# Patient Record
Sex: MALE | Race: WHITE | Employment: OTHER | URBAN - METROPOLITAN AREA
[De-identification: names, ages, dates, MRNs, and addresses within clinical notes are randomized per-mention and may not be internally consistent; named-entity substitution may affect disease eponyms.]

---

## 2018-03-21 ENCOUNTER — TELEPHONE (OUTPATIENT)
Dept: DERMATOLOGY | Facility: AMBULATORY SURGERY CENTER | Age: 78
End: 2018-03-21

## 2018-03-27 ENCOUNTER — OFFICE VISIT (OUTPATIENT)
Dept: DERMATOLOGY | Facility: AMBULATORY SURGERY CENTER | Age: 78
End: 2018-03-27

## 2018-03-27 VITALS
RESPIRATION RATE: 16 BRPM | HEIGHT: 73 IN | BODY MASS INDEX: 30.48 KG/M2 | WEIGHT: 230 LBS | DIASTOLIC BLOOD PRESSURE: 70 MMHG | OXYGEN SATURATION: 95 % | SYSTOLIC BLOOD PRESSURE: 130 MMHG | HEART RATE: 53 BPM

## 2018-03-27 DIAGNOSIS — C44.212 BASAL CELL CARCINOMA OF RIGHT EAR: Primary | ICD-10-CM

## 2018-03-27 RX ORDER — CARVEDILOL 12.5 MG/1
6.25 TABLET ORAL 2 TIMES DAILY WITH MEALS
COMMUNITY
Start: 2018-02-05 | End: 2022-01-01

## 2018-03-27 RX ORDER — LOSARTAN POTASSIUM 25 MG/1
25 TABLET ORAL DAILY
COMMUNITY
Start: 2018-02-06 | End: 2022-01-01

## 2018-03-27 RX ORDER — LOVASTATIN 20 MG/1
20 TABLET ORAL DAILY
COMMUNITY
Start: 2018-02-05 | End: 2022-01-01

## 2018-03-27 NOTE — PROGRESS NOTES
This note is written by Alexa Leong, as dictated by Guerda Rios. Nicolasa Alvarez MD.    CC: Basal cell carcinoma on the right scaphoid fossa     History of present illness:     Olu Lance is a 65 y.o. male referred by Dr. Volodymyr Jay. 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 This is a new basal cell carcinoma present for many months described as a lesion he picks at with no prior treatment. Biopsy confirmed the diagnosis of basal cell carcinoma, and I reviewed the written pathology report. He is feeling well and in his usual state of health today. He has no pain, no current illnesses, no other skin concerns. His allergies, medications, medical, and social history are reviewed by me today. Exam:     He is an awake, alert, and oriented 65 y.o. male who appears well and in no distress. There is no preauricular, submandibular, or cervical lymphadenopathy. I examined his right ear. He has a 14 x 8 mm white scar with central crust on his right scaphoid fossa. He confirms location. Assessment/plan:    1. Basal cell carcinoma, right scaphoid fossa. I discussed the diagnosis of basal cell carcinoma and summarized the pathology report. Mohs surgery is indicated by site and size. The procedure was discussed, verbal and written consent were obtained. I performed the procedure. Three stages were required to reach a tumor free plane. The surgical defect was managed with a full thickness skin graft. There were no complications. He will follow up in one week and as needed as the site heals. Indications, risks, and options were discussed with Olu Lance preoperatively. Risks including, but not limited to: pain, bleeding, infection, tumor recurrence, scarring and damage to motor and/or sensory nerves, were discussed. Olu Lance chose Mohs surgery. Olu Lance was an acceptable surgery candidate.     Olu Lance was placed in the appropriate position on the operating table in the Mohs surgery procedure room. The area was prepped and draped in the standard manner. Gentian violet was used to outline the clinical margins of the tumor. Local anesthesia was then obtained. The grossly visible tumor was then removed, an underlying layer was excised and mapped according to the Mohs technique, and the individual specimens examined microscopically. The process was repeated until microscopic examination of the tissue specimens confirmed a tumor-free plane. Hemostasis was obtained with electrosurgery and pressure. The wound was covered between stages with moist saline gauze. The wound management options of second intent healing, layered closure, local flap, or full thickness skin graft were discussed. Mr. Abbi Herrera understands the aims, risks, alternatives, and possible complications and elects to proceed with a full thickness skin graft. Mr. Abbi Herrera was placed in a supine position on the operating table in the Mohs surgery procedure room. The area was prepped and draped in the standard manner. A template of donor skin the size of the wound was drawn at the right preauricular. 1% lidocaine with epinephrine 1:100,000 was used to anesthetize the donor area. The graft was removed from the donor area and defatted. Hemostasis was obtained with spot electrocoagulation. The margins of the donor site were undermined and then the site was sutured with 5-0 polysorb sutures to approximate the skin edges. The defatted graft was placed on the recipient site and anchored in place with a running 5-0 fast absorbing gut suture around the margin. A pressure dressing utilizing Vaseline, Telfa, gauze and Coverroll was placed on the graft site. Skin glue, Telfa, gauze, and Coverroll were placed on the donor site. Wound care instructions (written and verbal) and a follow up appointment were given to the patient before discharge. Mr. Abbi Herrera was discharged in good condition. 2. History of nonmelanoma skin cancer. I discussed the diagnosis and recommend routine examinations with Dr. Yamil Aguirre for surveillance. The documentation recorded by the scribe accurately reflects the service I personally performed and the decisions made by me. [facility]   OFFICE PROCEDURE PROGRESS NOTE     Chart reviewed for the following:     Sonia Officer Kennth Fothergill, MD, have reviewed the History, Physical and updated the Allergic reactions for [address] [place] [address] 28 performed immediately prior to start of procedure:     Sonia Officer Kennth Fothergill, MD, have performed the following reviews on Pearlene Agent prior to the start of the procedure:     * Patient was identified by name and date of birth   * Agreement on procedure being performed was verified   * Risks and Benefits explained to the patient   * Procedure site verified and marked as necessary   * Patient was positioned for comfort   * Consent was signed and verified     Time: 10:25 AM   Date of procedure: 3/27/2018  Procedure performed by: Elvis Ramos.  Kennth Fothergill, MD   Provider assisted by: LPN   Patient assisted by: self   How tolerated by patient: tolerated the procedure well with no complications   Comments: none

## 2018-03-27 NOTE — MR AVS SNAPSHOT
[facility] [address] 
[phone] Patient: Wayne aHrdy MRN: [id number] :[id number] Visit Information Date & Time Provider Department Dept. Phone Encounter #  
 3/27/2018 10:30 AM MD Adelina Garcia [id number] Upcoming Health Maintenance Date Due DTaP/Tdap/Td series (1 - Tdap) 1960 ZOSTER VACCINE AGE 60> 10/23/1999 GLAUCOMA SCREENING Q2Y 2004 Pneumococcal 65+ Low/Medium Risk (1 of 2 - PCV13) 2004 Influenza Age 5 to Adult 2017 MEDICARE YEARLY EXAM 3/27/2018 Allergies as of 3/27/2018  Review Complete On: 3/27/2018 By: Clotilde Pereyra LPN No Known Allergies Current Immunizations  Never Reviewed No immunizations on file. Not reviewed this visit You Were Diagnosed With   
  
 Codes Comments Basal cell carcinoma of right ear    -  Primary ICD-10-CM: I88.363 ICD-9-CM: 173.21 Vitals BP Pulse Resp Height(growth percentile) Weight(growth percentile) SpO2  
 130/70 (BP 1 Location: Left arm, BP Patient Position: Sitting) (!) 53 16 6' 1\" (1.854 m) 230 lb (104.3 kg) 95% BMI Smoking Status 30.34 kg/m2 Former Smoker BMI and BSA Data Body Mass Index Body Surface Area  
 30.34 kg/m 2 2.32 m 2 Your Updated Medication List  
  
   
This list is accurate as of 3/27/18 10:52 AM.  Always use your most recent med list.  
  
  
  
  
 carvedilol 12.5 mg tablet Commonly known as:  COREG  
  
 losartan 25 mg tablet Commonly known as:  COZAAR  
  
 lovastatin 20 mg tablet Commonly known as:  MEVACOR Patient Instructions WOUND CARE INSTRUCTIONS 1. Keep the dressing clean and dry and do not remove for 48 hours. 2. Then change the dressing once a day as follows: 
a. Wash hands before and after each dressing change. b. Remove dressing and wash site gently with mild soap and water, rinse, and pat dry. 
c. Apply an ointment (Bacitracin, Polysporin, Neosporin, Petroleum jelly or Aquaphor). d. Apply a non-stick (Telfa) dressing or Band-Aid to cover the wound. Keep bandage on graft clean and dry for 1 week until follow up appointment. Remove pressure bandage from donor site in 48 hours and wash gently. 3. Watch for: BLEEDING: A small amount of drainage may occur. If bleeding occurs, elevate and rest the surgery site. Apply gauze and steady pressure for 15 minutes. If bleeding continues, call this office. INFECTION: Signs of infection include increased redness, pain, warmth, drainage of pus, and fever. If this occurs, call this office. 4. Special Instructions (follow any that are checked): 
· [] You have stitches that DO/ DO NOT need to be removed. · [] Avoid bending at the waist and heavy lifting for two days. · [] Sleep with your head elevated for the next two nights. · [] Rest the surgery site and keep it elevated as much as possible for two days. · [] You may apply an ice-pack for 10-15 minutes every waking hour for the rest of the day. · [] Eat a soft diet and avoid hot food and hot drinks for the rest of the day. · [] Other instructions: Follow up as directed. Take Tylenol or Ibuprofen for pain as needed. Once the site is healed with no remaining bandages or open areas, protect your surgical site and scar from the sun, as this area will be more sensitive. Use a broad spectrum sunscreen SPF 30 or higher daily, and a chemical free product (one containing zinc oxide or titanium dioxide) is a good choice if the area is sensitive. You may begin to gently massage the surgical site in 2-3 weeks, rubbing in a circular motion along the scar. This can help reduce swelling and thickness of a scar. A scar cream may be used beginnning 1 month after the surgery. If you have any questions or concerns, please call our office Monday through Friday at [phone]. Introducing [facility]! [facility] introduces [employer] patient portal. Now you can access parts of your medical record, email your doctor's office, and request medication refills online. 1. In your internet browser, go to [email or web address] 2. Click on the First Time User? Click Here link in the Sign In box. You will see the New Member Sign Up page. 3. Enter your [employer] Access Code exactly as it appears below. You will not need to use this code after youve completed the sign-up process. If you do not sign up before the expiration date, you must request a new code. · [employer] Access Code: [email or web address] Expires: 5/28/2018 10:07 AM 
 
4. Enter the last four digits of your Social Security Number (xxxx) and Date of Birth (mm/dd/yyyy) as indicated and click Submit. You will be taken to the next sign-up page. 5. Create a [employer] ID. This will be your [employer] login ID and cannot be changed, so think of one that is secure and easy to remember. 6. Create a [employer] password. You can change your password at any time. 7. Enter your Password Reset Question and Answer. This can be used at a later time if you forget your password. 8. Enter your e-mail address. You will receive e-mail notification when new information is available in [address] 9. Click Sign Up. You can now view and download portions of your medical record. 10. Click the Download Summary menu link to download a portable copy of your medical information. If you have questions, please visit the Frequently Asked Questions section of the [employer] website. Remember, [employer] is NOT to be used for urgent needs. For medical emergencies, dial 911. Now available from your iPhone and Android! Please provide this summary of care documentation to your next provider. Your primary care clinician is listed as Mac Eason. If you have any questions after today's visit, please call [phone].

## 2018-03-27 NOTE — PATIENT INSTRUCTIONS
WOUND CARE INSTRUCTIONS    1. Keep the dressing clean and dry and do not remove for 48 hours. 2. Then change the dressing once a day as follows:  a. Wash hands before and after each dressing change. b. Remove dressing and wash site gently with mild soap and water, rinse, and pat dry.  c. Apply an ointment (Bacitracin, Polysporin, Neosporin, Petroleum jelly or Aquaphor). d. Apply a non-stick (Telfa) dressing or Band-Aid to cover the wound. Keep bandage on graft clean and dry for 1 week until follow up appointment. Remove pressure bandage from donor site in 48 hours and wash gently. 3. Watch for:  BLEEDING: A small amount of drainage may occur. If bleeding occurs, elevate and rest the surgery site. Apply gauze and steady pressure for 15 minutes. If bleeding continues, call this office. INFECTION: Signs of infection include increased redness, pain, warmth, drainage of pus, and fever. If this occurs, call this office. 4. Special Instructions (follow any that are checked):  · [] You have stitches that DO/ DO NOT need to be removed. · [] Avoid bending at the waist and heavy lifting for two days. · [] Sleep with your head elevated for the next two nights. · [] Rest the surgery site and keep it elevated as much as possible for two days. · [] You may apply an ice-pack for 10-15 minutes every waking hour for the rest of the day. · [] Eat a soft diet and avoid hot food and hot drinks for the rest of the day. · [] Other instructions: Follow up as directed. Take Tylenol or Ibuprofen for pain as needed. Once the site is healed with no remaining bandages or open areas, protect your surgical site and scar from the sun, as this area will be more sensitive. Use a broad spectrum sunscreen SPF 30 or higher daily, and a chemical free product (one containing zinc oxide or titanium dioxide) is a good choice if the area is sensitive.     You may begin to gently massage the surgical site in 2-3 weeks, rubbing in a circular motion along the scar. This can help reduce swelling and thickness of a scar. A scar cream may be used beginnning 1 month after the surgery. If you have any questions or concerns, please call our office Monday through Friday at [phone].

## 2018-04-03 ENCOUNTER — OFFICE VISIT (OUTPATIENT)
Dept: DERMATOLOGY | Facility: AMBULATORY SURGERY CENTER | Age: 78
End: 2018-04-03

## 2018-04-03 DIAGNOSIS — Z85.828 HISTORY OF BASAL CELL CARCINOMA EXCISION: Primary | ICD-10-CM

## 2018-04-03 DIAGNOSIS — Z98.890 HISTORY OF BASAL CELL CARCINOMA EXCISION: Primary | ICD-10-CM

## 2018-04-03 DIAGNOSIS — Z94.5 HISTORY OF SKIN GRAFT: ICD-10-CM

## 2018-04-03 NOTE — PROGRESS NOTES
This note was written by Emilia Retana, as dictated by Barbie Cespedes MD.     Chief complaint: Graft check on the right scaphoid fossa     HPI: Randell Puentes presents for graft check following Mohs surgery performed on 3/27/18. I treated a basal cell carcinoma on his right scaphoid fossa. The surgical site was managed with a full thickness skin graft. The donor site was his right preauricular. He states the graft site has been healing well. Exam: The graft site was examined. There is not evidence of infection. There is not erythema. There is not edema. The graft is pink and healthy. The donor site was examined. He has a well healed donor site. Assessment/Plan:      1. Graft check, right scaphoid fossa. The graft site is healing well. Additional care was reviewed- vaseline for 1 more week on the graft. Follow up will be as needed. The documentation recorded by the scribe accurately reflects the service I personally performed and the decisions made by me.

## 2018-05-02 ENCOUNTER — HOSPITAL ENCOUNTER (OUTPATIENT)
Dept: RADIATION THERAPY | Age: 78
Discharge: HOME | End: 2018-05-02

## 2018-10-03 ENCOUNTER — HOSPITAL ENCOUNTER (OUTPATIENT)
Dept: RADIATION THERAPY | Age: 78
Discharge: HOME | End: 2018-10-03

## 2019-03-08 ENCOUNTER — HOSPITAL ENCOUNTER (OUTPATIENT)
Dept: RADIATION THERAPY | Age: 79
Discharge: HOME | End: 2019-03-08

## 2019-09-18 ENCOUNTER — HOSPITAL ENCOUNTER (OUTPATIENT)
Dept: RADIATION THERAPY | Age: 79
Discharge: HOME | End: 2019-09-18

## 2021-03-10 ENCOUNTER — APPOINTMENT (OUTPATIENT)
Dept: CT IMAGING | Age: 81
End: 2021-03-10
Attending: EMERGENCY MEDICINE
Payer: MEDICARE

## 2021-03-10 ENCOUNTER — HOSPITAL ENCOUNTER (EMERGENCY)
Age: 81
Discharge: HOME | End: 2021-03-10
Attending: EMERGENCY MEDICINE
Payer: MEDICARE

## 2021-03-10 ENCOUNTER — APPOINTMENT (OUTPATIENT)
Dept: GENERAL RADIOLOGY | Age: 81
End: 2021-03-10
Attending: EMERGENCY MEDICINE
Payer: MEDICARE

## 2021-03-10 VITALS
HEART RATE: 52 BPM | OXYGEN SATURATION: 98 % | SYSTOLIC BLOOD PRESSURE: 144 MMHG | RESPIRATION RATE: 16 BRPM | DIASTOLIC BLOOD PRESSURE: 71 MMHG | TEMPERATURE: 97.6 F

## 2021-03-10 DIAGNOSIS — F01.518 VASCULAR DEMENTIA WITH BEHAVIOR DISTURBANCE: ICD-10-CM

## 2021-03-10 DIAGNOSIS — R00.1 BRADYCARDIA: ICD-10-CM

## 2021-03-10 DIAGNOSIS — R55 SYNCOPE, UNSPECIFIED SYNCOPE TYPE: Primary | ICD-10-CM

## 2021-03-10 LAB
ATRIAL RATE: 300 BPM
CALCULATED R AXIS, ECG10: -21 DEGREES
CALCULATED T AXIS, ECG11: 146 DEGREES
DIAGNOSIS, 93000: NORMAL
Q-T INTERVAL, ECG07: 462 MS
QRS DURATION, ECG06: 136 MS
QTC CALCULATION (BEZET), ECG08: 399 MS
VENTRICULAR RATE, ECG03: 45 BPM

## 2021-03-10 PROCEDURE — 93005 ELECTROCARDIOGRAM TRACING: CPT

## 2021-03-10 PROCEDURE — 99284 EMERGENCY DEPT VISIT MOD MDM: CPT

## 2021-03-10 PROCEDURE — 71045 X-RAY EXAM CHEST 1 VIEW: CPT

## 2021-03-10 NOTE — ED PROVIDER NOTES
The patient is an 25-year-old male who presents to the ED by EMS who state that they were called at the scene by wife because the patient could not be awoken. Wife state that the patient got up and went to the bathroom then went to the kitchen and was gone for quite a bit. She went to the kitchen and found him on the chair slumped over and could not be awoken so she proceeded to call 911 for help. EMS state that upon arrival, the patient was given a deep sternal rub x2 and was eventually awoken. He was found to be bradycardic with a heart rate in the 50s and was brought to this ER for further evaluation. The patient is a very limited historian due to history of dementia. He denies any discomfort at this time.            Past Medical History:   Diagnosis Date    Skin cancer        Past Surgical History:   Procedure Laterality Date    HX MOHS PROCEDURES  03/27/2018    BCC R scaphoid fossa by Dr. [place]          Family History:   Problem Relation Age of Onset    Cancer Mother        Social History     Socioeconomic History    Marital status:      Spouse name: Not on file    Number of children: Not on file    Years of education: Not on file    Highest education level: Not on file   Occupational History    Not on file   Social Needs    Financial resource strain: Not on file    Food insecurity     Worry: Not on file     Inability: Not on file    Transportation needs     Medical: Not on file     Non-medical: Not on file   Tobacco Use    Smoking status: Former Smoker    Smokeless tobacco: Never Used   Substance and Sexual Activity    Alcohol use: Not on file    Drug use: Not on file    Sexual activity: Not on file   Lifestyle    Physical activity     Days per week: Not on file     Minutes per session: Not on file    Stress: Not on file   Relationships    Social connections     Talks on phone: Not on file     Gets together: Not on file     Attends Faith service: Not on file     Active member of club or organization: Not on file     Attends meetings of clubs or organizations: Not on file     Relationship status: Not on file    Intimate partner violence     Fear of current or ex partner: Not on file     Emotionally abused: Not on file     Physically abused: Not on file     Forced sexual activity: Not on file   Other Topics Concern    Not on file   Social History Narrative    Not on file         ALLERGIES: Patient has no known allergies. Review of Systems   All other systems reviewed and are negative. Vitals:    03/10/21 0541 03/10/21 0543   BP:  (!) 144/71   Pulse: (!) 52 (!) 52   Resp: 13 16   Temp:  97.6 °F (36.4 °C)   SpO2: 98% 98%            Physical Exam  Vitals signs and nursing note reviewed. Exam conducted with a chaperone present. CONSTITUTIONAL: Well-appearing; well-nourished; in no apparent distress  HEAD: Normocephalic; atraumatic  EYES: PERRL; EOM intact; conjunctiva and sclera are clear bilaterally. ENT: No rhinorrhea; normal pharynx with no tonsillar hypertrophy; mucous membranes pink/moist, no erythema, no exudate. NECK: Supple; non-tender; no cervical lymphadenopathy  CARD: Normal S1, S2; no murmurs, rubs, or gallops. Regular rate and rhythm. RESP: Normal respiratory effort; breath sounds clear and equal bilaterally; no wheezes, rhonchi, or rales. ABD: Normal bowel sounds; non-distended; non-tender; no palpable organomegaly, no masses, no bruits. Back Exam: Normal inspection; no vertebral point tenderness, no CVA tenderness. Normal range of motion. EXT: Normal ROM in all four extremities; non-tender to palpation; no swelling or deformity; distal pulses are normal, no edema. SKIN: Warm; dry; no rash.   NEURO:Alert and oriented x self and place, coherent, VANESSA-XII grossly intact, sensory and motor are non-focal.        MDM  Number of Diagnoses or Management Options  Diagnosis management comments: Assessment: Altered mental status with bradycardia in the 79-year-old male who is now awake, alert and responsive. Wife stated the patient is at his baseline with history of dementia. He appears hemodynamically stable. He will need evaluation for ACS, electrolyte abnormality and infection including CVA. Plan: CT scan of the head/EKG/chest x-ray/lab/IV fluid/orthostatic/serial exam/ Monitor and Reevaluate. Amount and/or Complexity of Data Reviewed  Clinical lab tests: ordered and reviewed  Tests in the radiology section of CPT®: ordered and reviewed  Tests in the medicine section of CPT®: reviewed and ordered  Discussion of test results with the performing providers: yes  Decide to obtain previous medical records or to obtain history from someone other than the patient: yes  Obtain history from someone other than the patient: yes  Review and summarize past medical records: yes  Discuss the patient with other providers: yes  Independent visualization of images, tracings, or specimens: yes    Risk of Complications, Morbidity, and/or Mortality  Presenting problems: moderate  Diagnostic procedures: moderate  Management options: moderate  General comments: Total critical care time spent exclusive of procedures:  39 MINUTES    Patient Progress  Patient progress: stable         Procedures    ED EKG interpretation:  Rhythm: atrial fib; and irregular with slow ventricular response. Rate (approx.): 48; Axis: left axis deviation; QRS interval: prolonged; ST/T wave: non-specific changes; in  Lead: Diffusely; Other findings: abnormal ekg. no prior EKG available for comparison. this EKG was interpreted by Mindy Young MD,ED Provider. XRAY INTERPRETATION (ED MD)  Chest Xray  No acute process seen. Normal heart size. No bony abnormalities. No infiltrate. Marvel Kraus MD 5:53 AM  Up with the medication      Progress Note:   Pt has been reexamined by Mindy Young MD. Pt is feeling much better. Symptoms have improved.  All available results have been reviewed with pt and any available family. Wife state the patient is at his baselin. She does not want too many testing done and will prefer to take him home and having follow-up with cardiology and PCP. Discussed goals of care with the patient and wife who stated that he is DNR and does not wish to be resuscitated aggressivel. pt understands sx, dx, and tx in ED. Care plan has been outlined and questions have been answered. Pt is ready to go home. Will send home on syncope/bradycardia instruction. Outpatient referral with PCP as needed. Written by Bharathi Posadas MD,6:04 AM    .   .

## 2021-03-10 NOTE — ED NOTES
The documentation for this period is being entered following the guidelines as defined in the [facility] policy by eJn Ordoñez RN.

## 2021-03-11 LAB
BASOPHILS # BLD: 0 K/UL (ref 0–0.1)
BASOPHILS NFR BLD: 1 % (ref 0–1)
BNP SERPL-MCNC: 1798 PG/ML
CK SERPL-CCNC: 49 U/L (ref 39–308)
COMMENT, HOLDF: NORMAL
DIFFERENTIAL METHOD BLD: ABNORMAL
EOSINOPHIL # BLD: 0.1 K/UL (ref 0–0.4)
EOSINOPHIL NFR BLD: 2 % (ref 0–7)
ERYTHROCYTE [DISTWIDTH] IN BLOOD BY AUTOMATED COUNT: 12.5 % (ref 11.5–14.5)
ETHANOL SERPL-MCNC: <10 MG/DL
HCT VFR BLD AUTO: 44.1 % (ref 36.6–50.3)
HGB BLD-MCNC: 14.9 G/DL (ref 12.1–17)
IMM GRANULOCYTES # BLD AUTO: 0 K/UL (ref 0–0.04)
IMM GRANULOCYTES NFR BLD AUTO: 1 % (ref 0–0.5)
LYMPHOCYTES # BLD: 1.7 K/UL (ref 0.8–3.5)
LYMPHOCYTES NFR BLD: 27 % (ref 12–49)
MAGNESIUM SERPL-MCNC: 2.3 MG/DL (ref 1.6–2.4)
MCH RBC QN AUTO: 31.1 PG (ref 26–34)
MCHC RBC AUTO-ENTMCNC: 33.8 G/DL (ref 30–36.5)
MCV RBC AUTO: 92.1 FL (ref 80–99)
MONOCYTES # BLD: 0.6 K/UL (ref 0–1)
MONOCYTES NFR BLD: 10 % (ref 5–13)
NEUTS SEG # BLD: 3.8 K/UL (ref 1.8–8)
NEUTS SEG NFR BLD: 59 % (ref 32–75)
NRBC # BLD: 0 K/UL (ref 0–0.01)
NRBC BLD-RTO: 0 PER 100 WBC
PHOSPHATE SERPL-MCNC: 3.4 MG/DL (ref 2.6–4.7)
PLATELET # BLD AUTO: 185 K/UL (ref 150–400)
PMV BLD AUTO: 9.4 FL (ref 8.9–12.9)
RBC # BLD AUTO: 4.79 M/UL (ref 4.1–5.7)
SAMPLES BEING HELD,HOLD: NORMAL
TROPONIN I SERPL-MCNC: <0.05 NG/ML
WBC # BLD AUTO: 6.4 K/UL (ref 4.1–11.1)

## 2022-01-01 ENCOUNTER — HOME CARE VISIT (OUTPATIENT)
Dept: HOSPICE | Facility: HOSPICE | Age: 82
End: 2022-01-01
Payer: MEDICARE

## 2022-01-01 ENCOUNTER — HOSPITAL ENCOUNTER (INPATIENT)
Age: 82
LOS: 9 days | Discharge: HOSPICE | DRG: 884 | End: 2022-07-03
Attending: EMERGENCY MEDICINE | Admitting: HOSPITALIST
Payer: MEDICARE

## 2022-01-01 ENCOUNTER — HOSPITAL ENCOUNTER (INPATIENT)
Age: 82
LOS: 3 days | End: 2022-07-06
Attending: FAMILY MEDICINE | Admitting: FAMILY MEDICINE

## 2022-01-01 ENCOUNTER — APPOINTMENT (OUTPATIENT)
Dept: CT IMAGING | Age: 82
DRG: 884 | End: 2022-01-01
Attending: HOSPITALIST
Payer: MEDICARE

## 2022-01-01 ENCOUNTER — APPOINTMENT (OUTPATIENT)
Dept: GENERAL RADIOLOGY | Age: 82
DRG: 884 | End: 2022-01-01
Attending: EMERGENCY MEDICINE
Payer: MEDICARE

## 2022-01-01 ENCOUNTER — HOSPICE ADMISSION (OUTPATIENT)
Dept: HOSPICE | Facility: HOSPICE | Age: 82
End: 2022-01-01
Payer: MEDICARE

## 2022-01-01 VITALS
RESPIRATION RATE: 10 BRPM | OXYGEN SATURATION: 86 % | DIASTOLIC BLOOD PRESSURE: 40 MMHG | WEIGHT: 206 LBS | SYSTOLIC BLOOD PRESSURE: 54 MMHG | HEIGHT: 73 IN | TEMPERATURE: 100.2 F | BODY MASS INDEX: 27.3 KG/M2 | HEART RATE: 88 BPM

## 2022-01-01 VITALS
OXYGEN SATURATION: 93 % | BODY MASS INDEX: 27.3 KG/M2 | HEIGHT: 73 IN | HEART RATE: 82 BPM | DIASTOLIC BLOOD PRESSURE: 87 MMHG | RESPIRATION RATE: 18 BRPM | WEIGHT: 206 LBS | TEMPERATURE: 97.9 F | SYSTOLIC BLOOD PRESSURE: 131 MMHG

## 2022-01-01 DIAGNOSIS — Z51.5 PALLIATIVE CARE ENCOUNTER: ICD-10-CM

## 2022-01-01 DIAGNOSIS — G93.40 ENCEPHALOPATHY: ICD-10-CM

## 2022-01-01 DIAGNOSIS — R63.30 FEEDING DIFFICULTIES: ICD-10-CM

## 2022-01-01 DIAGNOSIS — G93.41 METABOLIC ENCEPHALOPATHY: ICD-10-CM

## 2022-01-01 DIAGNOSIS — Z51.5 HOSPICE CARE: ICD-10-CM

## 2022-01-01 DIAGNOSIS — G30.9 ALZHEIMER'S DEMENTIA WITH BEHAVIORAL DISTURBANCE, UNSPECIFIED TIMING OF DEMENTIA ONSET: ICD-10-CM

## 2022-01-01 DIAGNOSIS — I44.1 SECOND DEGREE AV BLOCK, MOBITZ TYPE I: ICD-10-CM

## 2022-01-01 DIAGNOSIS — R45.1 RESTLESSNESS AND AGITATION: ICD-10-CM

## 2022-01-01 DIAGNOSIS — R06.02 SHORTNESS OF BREATH: ICD-10-CM

## 2022-01-01 DIAGNOSIS — R45.1 AGITATION: ICD-10-CM

## 2022-01-01 DIAGNOSIS — F03.91 DEMENTIA WITH BEHAVIORAL DISTURBANCE, UNSPECIFIED DEMENTIA TYPE: ICD-10-CM

## 2022-01-01 DIAGNOSIS — R00.1 AV JUNCTIONAL BRADYCARDIA: ICD-10-CM

## 2022-01-01 DIAGNOSIS — F01.518 VASCULAR DEMENTIA WITH BEHAVIOR DISTURBANCE: Primary | ICD-10-CM

## 2022-01-01 DIAGNOSIS — F02.81 ALZHEIMER'S DEMENTIA WITH BEHAVIORAL DISTURBANCE, UNSPECIFIED TIMING OF DEMENTIA ONSET: ICD-10-CM

## 2022-01-01 LAB
ALBUMIN SERPL-MCNC: 3.5 G/DL (ref 3.5–5)
ALBUMIN/GLOB SERPL: 1.2 {RATIO} (ref 1.1–2.2)
ALP SERPL-CCNC: 82 U/L (ref 45–117)
ALT SERPL-CCNC: 17 U/L (ref 12–78)
AMMONIA PLAS-SCNC: 21 UMOL/L
AMPHET UR QL SCN: NEGATIVE
ANION GAP SERPL CALC-SCNC: 10 MMOL/L (ref 5–15)
ANION GAP SERPL CALC-SCNC: 7 MMOL/L (ref 5–15)
ANION GAP SERPL CALC-SCNC: 8 MMOL/L (ref 5–15)
ANION GAP SERPL CALC-SCNC: 9 MMOL/L (ref 5–15)
ANION GAP SERPL CALC-SCNC: 9 MMOL/L (ref 5–15)
APPEARANCE UR: CLEAR
AST SERPL-CCNC: 21 U/L (ref 15–37)
ATRIAL RATE: 250 BPM
ATRIAL RATE: 39 BPM
ATRIAL RATE: 500 BPM
ATRIAL RATE: 84 BPM
ATRIAL RATE: 88 BPM
BACTERIA SPEC CULT: NORMAL
BACTERIA URNS QL MICRO: NEGATIVE /HPF
BARBITURATES UR QL SCN: NEGATIVE
BASOPHILS # BLD: 0 K/UL (ref 0–0.1)
BASOPHILS # BLD: 0.1 K/UL (ref 0–0.1)
BASOPHILS NFR BLD: 0 % (ref 0–1)
BASOPHILS NFR BLD: 1 % (ref 0–1)
BENZODIAZ UR QL: NEGATIVE
BILIRUB SERPL-MCNC: 1 MG/DL (ref 0.2–1)
BILIRUB UR QL: NEGATIVE
BUN SERPL-MCNC: 10 MG/DL (ref 6–20)
BUN SERPL-MCNC: 11 MG/DL (ref 6–20)
BUN SERPL-MCNC: 12 MG/DL (ref 6–20)
BUN SERPL-MCNC: 13 MG/DL (ref 6–20)
BUN SERPL-MCNC: 7 MG/DL (ref 6–20)
BUN SERPL-MCNC: 8 MG/DL (ref 6–20)
BUN SERPL-MCNC: 8 MG/DL (ref 6–20)
BUN/CREAT SERPL: 11 (ref 12–20)
BUN/CREAT SERPL: 13 (ref 12–20)
BUN/CREAT SERPL: 15 (ref 12–20)
BUN/CREAT SERPL: 15 (ref 12–20)
BUN/CREAT SERPL: 16 (ref 12–20)
BUN/CREAT SERPL: 21 (ref 12–20)
BUN/CREAT SERPL: 22 (ref 12–20)
CALCIUM SERPL-MCNC: 8.5 MG/DL (ref 8.5–10.1)
CALCIUM SERPL-MCNC: 8.6 MG/DL (ref 8.5–10.1)
CALCIUM SERPL-MCNC: 8.7 MG/DL (ref 8.5–10.1)
CALCIUM SERPL-MCNC: 8.7 MG/DL (ref 8.5–10.1)
CALCIUM SERPL-MCNC: 8.8 MG/DL (ref 8.5–10.1)
CALCIUM SERPL-MCNC: 8.8 MG/DL (ref 8.5–10.1)
CALCIUM SERPL-MCNC: 8.9 MG/DL (ref 8.5–10.1)
CALCULATED R AXIS, ECG10: -22 DEGREES
CALCULATED R AXIS, ECG10: -26 DEGREES
CALCULATED R AXIS, ECG10: -36 DEGREES
CALCULATED R AXIS, ECG10: -8 DEGREES
CALCULATED R AXIS, ECG10: -8 DEGREES
CALCULATED T AXIS, ECG11: -146 DEGREES
CALCULATED T AXIS, ECG11: -166 DEGREES
CALCULATED T AXIS, ECG11: 144 DEGREES
CALCULATED T AXIS, ECG11: 168 DEGREES
CALCULATED T AXIS, ECG11: 175 DEGREES
CANNABINOIDS UR QL SCN: NEGATIVE
CHLORIDE SERPL-SCNC: 105 MMOL/L (ref 97–108)
CHLORIDE SERPL-SCNC: 105 MMOL/L (ref 97–108)
CHLORIDE SERPL-SCNC: 106 MMOL/L (ref 97–108)
CHLORIDE SERPL-SCNC: 107 MMOL/L (ref 97–108)
CHLORIDE SERPL-SCNC: 108 MMOL/L (ref 97–108)
CO2 SERPL-SCNC: 23 MMOL/L (ref 21–32)
CO2 SERPL-SCNC: 24 MMOL/L (ref 21–32)
CO2 SERPL-SCNC: 25 MMOL/L (ref 21–32)
CO2 SERPL-SCNC: 26 MMOL/L (ref 21–32)
CO2 SERPL-SCNC: 26 MMOL/L (ref 21–32)
CO2 SERPL-SCNC: 27 MMOL/L (ref 21–32)
CO2 SERPL-SCNC: 28 MMOL/L (ref 21–32)
COCAINE UR QL SCN: NEGATIVE
COLOR UR: NORMAL
COVID-19 RAPID TEST, COVR: NOT DETECTED
CREAT SERPL-MCNC: 0.52 MG/DL (ref 0.7–1.3)
CREAT SERPL-MCNC: 0.54 MG/DL (ref 0.7–1.3)
CREAT SERPL-MCNC: 0.58 MG/DL (ref 0.7–1.3)
CREAT SERPL-MCNC: 0.58 MG/DL (ref 0.7–1.3)
CREAT SERPL-MCNC: 0.67 MG/DL (ref 0.7–1.3)
CREAT SERPL-MCNC: 0.7 MG/DL (ref 0.7–1.3)
CREAT SERPL-MCNC: 0.7 MG/DL (ref 0.7–1.3)
DIAGNOSIS, 93000: NORMAL
DIFFERENTIAL METHOD BLD: ABNORMAL
DIFFERENTIAL METHOD BLD: NORMAL
DRUG SCRN COMMENT,DRGCM: NORMAL
EOSINOPHIL # BLD: 0.1 K/UL (ref 0–0.4)
EOSINOPHIL NFR BLD: 1 % (ref 0–7)
EOSINOPHIL NFR BLD: 2 % (ref 0–7)
EPITH CASTS URNS QL MICRO: NORMAL /LPF
ERYTHROCYTE [DISTWIDTH] IN BLOOD BY AUTOMATED COUNT: 12.3 % (ref 11.5–14.5)
ERYTHROCYTE [DISTWIDTH] IN BLOOD BY AUTOMATED COUNT: 12.4 % (ref 11.5–14.5)
ERYTHROCYTE [DISTWIDTH] IN BLOOD BY AUTOMATED COUNT: 12.4 % (ref 11.5–14.5)
ERYTHROCYTE [DISTWIDTH] IN BLOOD BY AUTOMATED COUNT: 12.5 % (ref 11.5–14.5)
ETHANOL SERPL-MCNC: <10 MG/DL
FOLATE SERPL-MCNC: 9.1 NG/ML (ref 5–21)
GLOBULIN SER CALC-MCNC: 3 G/DL (ref 2–4)
GLUCOSE BLD STRIP.AUTO-MCNC: 79 MG/DL (ref 65–117)
GLUCOSE SERPL-MCNC: 107 MG/DL (ref 65–100)
GLUCOSE SERPL-MCNC: 126 MG/DL (ref 65–100)
GLUCOSE SERPL-MCNC: 75 MG/DL (ref 65–100)
GLUCOSE SERPL-MCNC: 85 MG/DL (ref 65–100)
GLUCOSE SERPL-MCNC: 90 MG/DL (ref 65–100)
GLUCOSE SERPL-MCNC: 97 MG/DL (ref 65–100)
GLUCOSE SERPL-MCNC: 97 MG/DL (ref 65–100)
GLUCOSE UR STRIP.AUTO-MCNC: NEGATIVE MG/DL
HCT VFR BLD AUTO: 42 % (ref 36.6–50.3)
HCT VFR BLD AUTO: 43.4 % (ref 36.6–50.3)
HCT VFR BLD AUTO: 44.4 % (ref 36.6–50.3)
HCT VFR BLD AUTO: 44.8 % (ref 36.6–50.3)
HCT VFR BLD AUTO: 44.8 % (ref 36.6–50.3)
HCT VFR BLD AUTO: 47.7 % (ref 36.6–50.3)
HGB BLD-MCNC: 13.9 G/DL (ref 12.1–17)
HGB BLD-MCNC: 15.2 G/DL (ref 12.1–17)
HGB BLD-MCNC: 15.5 G/DL (ref 12.1–17)
HGB BLD-MCNC: 15.6 G/DL (ref 12.1–17)
HGB BLD-MCNC: 15.6 G/DL (ref 12.1–17)
HGB BLD-MCNC: 16.3 G/DL (ref 12.1–17)
HGB UR QL STRIP: NEGATIVE
HYALINE CASTS URNS QL MICRO: NORMAL /LPF (ref 0–2)
IMM GRANULOCYTES # BLD AUTO: 0 K/UL (ref 0–0.04)
IMM GRANULOCYTES NFR BLD AUTO: 0 % (ref 0–0.5)
KETONES UR QL STRIP.AUTO: NEGATIVE MG/DL
LACTATE SERPL-SCNC: 1 MMOL/L (ref 0.4–2)
LEUKOCYTE ESTERASE UR QL STRIP.AUTO: NEGATIVE
LIPASE SERPL-CCNC: 195 U/L (ref 73–393)
LYMPHOCYTES # BLD: 1.4 K/UL (ref 0.8–3.5)
LYMPHOCYTES # BLD: 1.6 K/UL (ref 0.8–3.5)
LYMPHOCYTES # BLD: 1.7 K/UL (ref 0.8–3.5)
LYMPHOCYTES # BLD: 1.8 K/UL (ref 0.8–3.5)
LYMPHOCYTES NFR BLD: 19 % (ref 12–49)
LYMPHOCYTES NFR BLD: 21 % (ref 12–49)
LYMPHOCYTES NFR BLD: 21 % (ref 12–49)
LYMPHOCYTES NFR BLD: 26 % (ref 12–49)
MAGNESIUM SERPL-MCNC: 1.7 MG/DL (ref 1.6–2.4)
MAGNESIUM SERPL-MCNC: 1.7 MG/DL (ref 1.6–2.4)
MAGNESIUM SERPL-MCNC: 1.8 MG/DL (ref 1.6–2.4)
MAGNESIUM SERPL-MCNC: 2 MG/DL (ref 1.6–2.4)
MAGNESIUM SERPL-MCNC: 2 MG/DL (ref 1.6–2.4)
MCH RBC QN AUTO: 31.5 PG (ref 26–34)
MCH RBC QN AUTO: 31.5 PG (ref 26–34)
MCH RBC QN AUTO: 31.6 PG (ref 26–34)
MCH RBC QN AUTO: 31.9 PG (ref 26–34)
MCH RBC QN AUTO: 32.2 PG (ref 26–34)
MCH RBC QN AUTO: 32.3 PG (ref 26–34)
MCHC RBC AUTO-ENTMCNC: 33.1 G/DL (ref 30–36.5)
MCHC RBC AUTO-ENTMCNC: 34.2 G/DL (ref 30–36.5)
MCHC RBC AUTO-ENTMCNC: 34.8 G/DL (ref 30–36.5)
MCHC RBC AUTO-ENTMCNC: 34.8 G/DL (ref 30–36.5)
MCHC RBC AUTO-ENTMCNC: 34.9 G/DL (ref 30–36.5)
MCHC RBC AUTO-ENTMCNC: 35 G/DL (ref 30–36.5)
MCV RBC AUTO: 90.2 FL (ref 80–99)
MCV RBC AUTO: 90.5 FL (ref 80–99)
MCV RBC AUTO: 91.4 FL (ref 80–99)
MCV RBC AUTO: 92.3 FL (ref 80–99)
MCV RBC AUTO: 92.4 FL (ref 80–99)
MCV RBC AUTO: 97.7 FL (ref 80–99)
METHADONE UR QL: NEGATIVE
MONOCYTES # BLD: 0.6 K/UL (ref 0–1)
MONOCYTES # BLD: 0.8 K/UL (ref 0–1)
MONOCYTES NFR BLD: 10 % (ref 5–13)
MONOCYTES NFR BLD: 10 % (ref 5–13)
MONOCYTES NFR BLD: 11 % (ref 5–13)
MONOCYTES NFR BLD: 9 % (ref 5–13)
NEUTS SEG # BLD: 3.8 K/UL (ref 1.8–8)
NEUTS SEG # BLD: 5.1 K/UL (ref 1.8–8)
NEUTS SEG # BLD: 5.2 K/UL (ref 1.8–8)
NEUTS SEG # BLD: 5.7 K/UL (ref 1.8–8)
NEUTS SEG NFR BLD: 62 % (ref 32–75)
NEUTS SEG NFR BLD: 66 % (ref 32–75)
NEUTS SEG NFR BLD: 68 % (ref 32–75)
NEUTS SEG NFR BLD: 69 % (ref 32–75)
NITRITE UR QL STRIP.AUTO: NEGATIVE
NRBC # BLD: 0 K/UL (ref 0–0.01)
NRBC BLD-RTO: 0 PER 100 WBC
OPIATES UR QL: NEGATIVE
PCP UR QL: NEGATIVE
PH UR STRIP: 5.5 [PH] (ref 5–8)
PHOSPHATE SERPL-MCNC: 3.1 MG/DL (ref 2.6–4.7)
PLATELET # BLD AUTO: 186 K/UL (ref 150–400)
PLATELET # BLD AUTO: 188 K/UL (ref 150–400)
PLATELET # BLD AUTO: 201 K/UL (ref 150–400)
PLATELET # BLD AUTO: 203 K/UL (ref 150–400)
PLATELET # BLD AUTO: 209 K/UL (ref 150–400)
PLATELET # BLD AUTO: 257 K/UL (ref 150–400)
PMV BLD AUTO: 8.8 FL (ref 8.9–12.9)
PMV BLD AUTO: 9.1 FL (ref 8.9–12.9)
PMV BLD AUTO: 9.1 FL (ref 8.9–12.9)
PMV BLD AUTO: 9.3 FL (ref 8.9–12.9)
PMV BLD AUTO: 9.3 FL (ref 8.9–12.9)
PMV BLD AUTO: 9.4 FL (ref 8.9–12.9)
POTASSIUM SERPL-SCNC: 3.2 MMOL/L (ref 3.5–5.1)
POTASSIUM SERPL-SCNC: 3.5 MMOL/L (ref 3.5–5.1)
POTASSIUM SERPL-SCNC: 3.5 MMOL/L (ref 3.5–5.1)
POTASSIUM SERPL-SCNC: 3.8 MMOL/L (ref 3.5–5.1)
POTASSIUM SERPL-SCNC: 3.9 MMOL/L (ref 3.5–5.1)
POTASSIUM SERPL-SCNC: 4 MMOL/L (ref 3.5–5.1)
POTASSIUM SERPL-SCNC: 4 MMOL/L (ref 3.5–5.1)
PROT SERPL-MCNC: 6.5 G/DL (ref 6.4–8.2)
PROT UR STRIP-MCNC: NEGATIVE MG/DL
Q-T INTERVAL, ECG07: 402 MS
Q-T INTERVAL, ECG07: 426 MS
Q-T INTERVAL, ECG07: 434 MS
Q-T INTERVAL, ECG07: 436 MS
Q-T INTERVAL, ECG07: 486 MS
QRS DURATION, ECG06: 124 MS
QRS DURATION, ECG06: 126 MS
QRS DURATION, ECG06: 126 MS
QRS DURATION, ECG06: 130 MS
QRS DURATION, ECG06: 134 MS
QTC CALCULATION (BEZET), ECG08: 400 MS
QTC CALCULATION (BEZET), ECG08: 446 MS
QTC CALCULATION (BEZET), ECG08: 447 MS
QTC CALCULATION (BEZET), ECG08: 502 MS
QTC CALCULATION (BEZET), ECG08: 507 MS
RBC # BLD AUTO: 4.3 M/UL (ref 4.1–5.7)
RBC # BLD AUTO: 4.81 M/UL (ref 4.1–5.7)
RBC # BLD AUTO: 4.85 M/UL (ref 4.1–5.7)
RBC # BLD AUTO: 4.86 M/UL (ref 4.1–5.7)
RBC # BLD AUTO: 4.95 M/UL (ref 4.1–5.7)
RBC # BLD AUTO: 5.17 M/UL (ref 4.1–5.7)
RBC #/AREA URNS HPF: NORMAL /HPF (ref 0–5)
SERVICE CMNT-IMP: NORMAL
SERVICE CMNT-IMP: NORMAL
SODIUM SERPL-SCNC: 138 MMOL/L (ref 136–145)
SODIUM SERPL-SCNC: 140 MMOL/L (ref 136–145)
SODIUM SERPL-SCNC: 141 MMOL/L (ref 136–145)
SODIUM SERPL-SCNC: 142 MMOL/L (ref 136–145)
SODIUM SERPL-SCNC: 142 MMOL/L (ref 136–145)
SOURCE, COVRS: NORMAL
SP GR UR REFRACTOMETRY: 1.01 (ref 1–1.03)
TROPONIN-HIGH SENSITIVITY: 11 NG/L (ref 0–76)
TSH SERPL DL<=0.05 MIU/L-ACNC: 2.52 UIU/ML (ref 0.36–3.74)
TSH SERPL DL<=0.05 MIU/L-ACNC: 2.82 UIU/ML (ref 0.36–3.74)
UA: UC IF INDICATED,UAUC: NORMAL
UROBILINOGEN UR QL STRIP.AUTO: 1 EU/DL (ref 0.2–1)
VENTRICULAR RATE, ECG03: 41 BPM
VENTRICULAR RATE, ECG03: 64 BPM
VENTRICULAR RATE, ECG03: 66 BPM
VENTRICULAR RATE, ECG03: 80 BPM
VENTRICULAR RATE, ECG03: 96 BPM
VIT B12 SERPL-MCNC: 343 PG/ML (ref 193–986)
WBC # BLD AUTO: 6.1 K/UL (ref 4.1–11.1)
WBC # BLD AUTO: 6.2 K/UL (ref 4.1–11.1)
WBC # BLD AUTO: 7.1 K/UL (ref 4.1–11.1)
WBC # BLD AUTO: 7.4 K/UL (ref 4.1–11.1)
WBC # BLD AUTO: 7.8 K/UL (ref 4.1–11.1)
WBC # BLD AUTO: 8.3 K/UL (ref 4.1–11.1)
WBC URNS QL MICRO: NORMAL /HPF (ref 0–4)

## 2022-01-01 PROCEDURE — 74011250636 HC RX REV CODE- 250/636: Performed by: FAMILY MEDICINE

## 2022-01-01 PROCEDURE — 93005 ELECTROCARDIOGRAM TRACING: CPT

## 2022-01-01 PROCEDURE — 80048 BASIC METABOLIC PNL TOTAL CA: CPT

## 2022-01-01 PROCEDURE — G0299 HHS/HOSPICE OF RN EA 15 MIN: HCPCS

## 2022-01-01 PROCEDURE — 83735 ASSAY OF MAGNESIUM: CPT

## 2022-01-01 PROCEDURE — 36415 COLL VENOUS BLD VENIPUNCTURE: CPT

## 2022-01-01 PROCEDURE — G0155 HHCP-SVS OF CSW,EA 15 MIN: HCPCS

## 2022-01-01 PROCEDURE — 74011250636 HC RX REV CODE- 250/636

## 2022-01-01 PROCEDURE — 74011250637 HC RX REV CODE- 250/637: Performed by: HOSPITALIST

## 2022-01-01 PROCEDURE — 85025 COMPLETE CBC W/AUTO DIFF WBC: CPT

## 2022-01-01 PROCEDURE — 74011250636 HC RX REV CODE- 250/636: Performed by: HOSPITALIST

## 2022-01-01 PROCEDURE — 71045 X-RAY EXAM CHEST 1 VIEW: CPT

## 2022-01-01 PROCEDURE — 74011250636 HC RX REV CODE- 250/636: Performed by: NURSE PRACTITIONER

## 2022-01-01 PROCEDURE — 99285 EMERGENCY DEPT VISIT HI MDM: CPT

## 2022-01-01 PROCEDURE — 74011250637 HC RX REV CODE- 250/637: Performed by: INTERNAL MEDICINE

## 2022-01-01 PROCEDURE — 65270000046 HC RM TELEMETRY

## 2022-01-01 PROCEDURE — 70450 CT HEAD/BRAIN W/O DYE: CPT

## 2022-01-01 PROCEDURE — 95816 EEG AWAKE AND DROWSY: CPT | Performed by: PSYCHIATRY & NEUROLOGY

## 2022-01-01 PROCEDURE — 0656 HSPC GENERAL INPATIENT

## 2022-01-01 PROCEDURE — 81001 URINALYSIS AUTO W/SCOPE: CPT

## 2022-01-01 PROCEDURE — 74011250637 HC RX REV CODE- 250/637: Performed by: FAMILY MEDICINE

## 2022-01-01 PROCEDURE — 85027 COMPLETE CBC AUTOMATED: CPT

## 2022-01-01 PROCEDURE — 87086 URINE CULTURE/COLONY COUNT: CPT

## 2022-01-01 PROCEDURE — 84100 ASSAY OF PHOSPHORUS: CPT

## 2022-01-01 PROCEDURE — 74011000250 HC RX REV CODE- 250: Performed by: FAMILY MEDICINE

## 2022-01-01 PROCEDURE — 83605 ASSAY OF LACTIC ACID: CPT

## 2022-01-01 PROCEDURE — 82962 GLUCOSE BLOOD TEST: CPT

## 2022-01-01 PROCEDURE — 99223 1ST HOSP IP/OBS HIGH 75: CPT | Performed by: PSYCHIATRY & NEUROLOGY

## 2022-01-01 PROCEDURE — 80307 DRUG TEST PRSMV CHEM ANLYZR: CPT

## 2022-01-01 PROCEDURE — 82077 ASSAY SPEC XCP UR&BREATH IA: CPT

## 2022-01-01 PROCEDURE — 96376 TX/PRO/DX INJ SAME DRUG ADON: CPT

## 2022-01-01 PROCEDURE — 74011250636 HC RX REV CODE- 250/636: Performed by: EMERGENCY MEDICINE

## 2022-01-01 PROCEDURE — 65270000029 HC RM PRIVATE

## 2022-01-01 PROCEDURE — 84443 ASSAY THYROID STIM HORMONE: CPT

## 2022-01-01 PROCEDURE — 82746 ASSAY OF FOLIC ACID SERUM: CPT

## 2022-01-01 PROCEDURE — 87635 SARS-COV-2 COVID-19 AMP PRB: CPT

## 2022-01-01 PROCEDURE — 96374 THER/PROPH/DIAG INJ IV PUSH: CPT

## 2022-01-01 PROCEDURE — 99223 1ST HOSP IP/OBS HIGH 75: CPT | Performed by: NURSE PRACTITIONER

## 2022-01-01 PROCEDURE — 82607 VITAMIN B-12: CPT

## 2022-01-01 PROCEDURE — 82140 ASSAY OF AMMONIA: CPT

## 2022-01-01 PROCEDURE — 96375 TX/PRO/DX INJ NEW DRUG ADDON: CPT

## 2022-01-01 PROCEDURE — 96372 THER/PROPH/DIAG INJ SC/IM: CPT

## 2022-01-01 PROCEDURE — 3336500001 HSPC ELECTION

## 2022-01-01 PROCEDURE — 80053 COMPREHEN METABOLIC PANEL: CPT

## 2022-01-01 PROCEDURE — 84484 ASSAY OF TROPONIN QUANT: CPT

## 2022-01-01 PROCEDURE — 83690 ASSAY OF LIPASE: CPT

## 2022-01-01 RX ORDER — QUETIAPINE FUMARATE 25 MG/1
25 TABLET, FILM COATED ORAL 3 TIMES DAILY
Status: DISCONTINUED | OUTPATIENT
Start: 2022-01-01 | End: 2022-01-01

## 2022-01-01 RX ORDER — ZIPRASIDONE HYDROCHLORIDE 20 MG/1
20 CAPSULE ORAL
Status: DISCONTINUED | OUTPATIENT
Start: 2022-01-01 | End: 2022-01-01

## 2022-01-01 RX ORDER — LORAZEPAM 2 MG/ML
INJECTION, SOLUTION INTRAMUSCULAR; INTRAVENOUS
Status: COMPLETED
Start: 2022-01-01 | End: 2022-01-01

## 2022-01-01 RX ORDER — LORAZEPAM 2 MG/ML
1 CONCENTRATE ORAL
Status: DISCONTINUED | OUTPATIENT
Start: 2022-01-01 | End: 2022-07-07 | Stop reason: HOSPADM

## 2022-01-01 RX ORDER — SODIUM CHLORIDE 9 MG/ML
75 INJECTION, SOLUTION INTRAVENOUS CONTINUOUS
Status: DISCONTINUED | OUTPATIENT
Start: 2022-01-01 | End: 2022-01-01

## 2022-01-01 RX ORDER — MORPHINE SULFATE 4 MG/ML
4 INJECTION INTRAVENOUS
Status: DISCONTINUED | OUTPATIENT
Start: 2022-01-01 | End: 2022-01-01

## 2022-01-01 RX ORDER — HALOPERIDOL 5 MG/ML
5 INJECTION INTRAMUSCULAR EVERY 6 HOURS
Status: DISCONTINUED | OUTPATIENT
Start: 2022-01-01 | End: 2022-01-01

## 2022-01-01 RX ORDER — HALOPERIDOL 5 MG/ML
5 INJECTION INTRAMUSCULAR EVERY 8 HOURS
Status: DISCONTINUED | OUTPATIENT
Start: 2022-01-01 | End: 2022-01-01

## 2022-01-01 RX ORDER — QUETIAPINE FUMARATE 25 MG/1
25 TABLET, FILM COATED ORAL 2 TIMES DAILY
Status: DISCONTINUED | OUTPATIENT
Start: 2022-01-01 | End: 2022-01-01

## 2022-01-01 RX ORDER — LORAZEPAM 2 MG/ML
2 INJECTION, SOLUTION INTRAMUSCULAR; INTRAVENOUS
Status: DISCONTINUED | OUTPATIENT
Start: 2022-01-01 | End: 2022-07-07 | Stop reason: HOSPADM

## 2022-01-01 RX ORDER — HALOPERIDOL 5 MG/ML
2 INJECTION INTRAMUSCULAR
Status: DISCONTINUED | OUTPATIENT
Start: 2022-01-01 | End: 2022-01-01

## 2022-01-01 RX ORDER — HALOPERIDOL 5 MG/ML
2 INJECTION INTRAMUSCULAR
Status: DISCONTINUED | OUTPATIENT
Start: 2022-01-01 | End: 2022-01-01 | Stop reason: HOSPADM

## 2022-01-01 RX ORDER — LORAZEPAM 2 MG/ML
1 INJECTION, SOLUTION INTRAMUSCULAR; INTRAVENOUS
Status: DISCONTINUED | OUTPATIENT
Start: 2022-01-01 | End: 2022-01-01

## 2022-01-01 RX ORDER — QUETIAPINE FUMARATE 25 MG/1
25 TABLET, FILM COATED ORAL
Status: COMPLETED | OUTPATIENT
Start: 2022-01-01 | End: 2022-01-01

## 2022-01-01 RX ORDER — ENOXAPARIN SODIUM 100 MG/ML
40 INJECTION SUBCUTANEOUS EVERY 24 HOURS
Status: DISCONTINUED | OUTPATIENT
Start: 2022-01-01 | End: 2022-01-01 | Stop reason: HOSPADM

## 2022-01-01 RX ORDER — ACETAMINOPHEN 650 MG/1
650 SUPPOSITORY RECTAL
Status: DISCONTINUED | OUTPATIENT
Start: 2022-01-01 | End: 2022-07-07 | Stop reason: HOSPADM

## 2022-01-01 RX ORDER — MORPHINE SULFATE 4 MG/ML
4 INJECTION INTRAVENOUS
Status: DISCONTINUED | OUTPATIENT
Start: 2022-01-01 | End: 2022-07-07 | Stop reason: HOSPADM

## 2022-01-01 RX ORDER — POTASSIUM CHLORIDE AND SODIUM CHLORIDE 900; 300 MG/100ML; MG/100ML
INJECTION, SOLUTION INTRAVENOUS CONTINUOUS
Status: DISCONTINUED | OUTPATIENT
Start: 2022-01-01 | End: 2022-01-01 | Stop reason: HOSPADM

## 2022-01-01 RX ORDER — LORAZEPAM 2 MG/ML
1 INJECTION INTRAMUSCULAR
Status: DISCONTINUED | OUTPATIENT
Start: 2022-01-01 | End: 2022-01-01

## 2022-01-01 RX ORDER — MAGNESIUM SULFATE HEPTAHYDRATE 40 MG/ML
2 INJECTION, SOLUTION INTRAVENOUS ONCE
Status: COMPLETED | OUTPATIENT
Start: 2022-01-01 | End: 2022-01-01

## 2022-01-01 RX ORDER — HALOPERIDOL 2 MG/ML
2 SOLUTION ORAL
Status: DISCONTINUED | OUTPATIENT
Start: 2022-01-01 | End: 2022-07-07 | Stop reason: HOSPADM

## 2022-01-01 RX ORDER — GLYCOPYRROLATE 0.2 MG/ML
0.2 INJECTION INTRAMUSCULAR; INTRAVENOUS
Status: DISCONTINUED | OUTPATIENT
Start: 2022-01-01 | End: 2022-07-07 | Stop reason: HOSPADM

## 2022-01-01 RX ORDER — RISPERIDONE 0.25 MG/1
0.5 TABLET, FILM COATED ORAL 2 TIMES DAILY
Status: DISCONTINUED | OUTPATIENT
Start: 2022-01-01 | End: 2022-01-01

## 2022-01-01 RX ORDER — MORPHINE SULFATE 20 MG/ML
5 SOLUTION ORAL
Status: DISCONTINUED | OUTPATIENT
Start: 2022-01-01 | End: 2022-01-01

## 2022-01-01 RX ORDER — HYDROMORPHONE HYDROCHLORIDE 2 MG/ML
2 INJECTION, SOLUTION INTRAMUSCULAR; INTRAVENOUS; SUBCUTANEOUS
Status: DISCONTINUED | OUTPATIENT
Start: 2022-01-01 | End: 2022-07-07 | Stop reason: HOSPADM

## 2022-01-01 RX ORDER — HALOPERIDOL 5 MG/ML
2 INJECTION INTRAMUSCULAR ONCE
Status: DISCONTINUED | OUTPATIENT
Start: 2022-01-01 | End: 2022-01-01

## 2022-01-01 RX ORDER — DIPHENHYDRAMINE HYDROCHLORIDE 50 MG/ML
INJECTION, SOLUTION INTRAMUSCULAR; INTRAVENOUS
Status: COMPLETED
Start: 2022-01-01 | End: 2022-01-01

## 2022-01-01 RX ORDER — LORAZEPAM 2 MG/ML
2 INJECTION, SOLUTION INTRAMUSCULAR; INTRAVENOUS
Status: COMPLETED | OUTPATIENT
Start: 2022-01-01 | End: 2022-01-01

## 2022-01-01 RX ORDER — MEMANTINE HYDROCHLORIDE 10 MG/1
10 TABLET ORAL 2 TIMES DAILY
Status: DISCONTINUED | OUTPATIENT
Start: 2022-01-01 | End: 2022-01-01 | Stop reason: HOSPADM

## 2022-01-01 RX ORDER — ONDANSETRON 2 MG/ML
4 INJECTION INTRAMUSCULAR; INTRAVENOUS
Status: DISCONTINUED | OUTPATIENT
Start: 2022-01-01 | End: 2022-01-01 | Stop reason: HOSPADM

## 2022-01-01 RX ORDER — ACETAMINOPHEN 325 MG/1
650 TABLET ORAL
Status: DISCONTINUED | OUTPATIENT
Start: 2022-01-01 | End: 2022-01-01 | Stop reason: HOSPADM

## 2022-01-01 RX ORDER — MORPHINE SULFATE 2 MG/ML
1 INJECTION, SOLUTION INTRAMUSCULAR; INTRAVENOUS
Status: DISCONTINUED | OUTPATIENT
Start: 2022-01-01 | End: 2022-01-01

## 2022-01-01 RX ORDER — HALOPERIDOL 5 MG/ML
2 INJECTION INTRAMUSCULAR EVERY 6 HOURS
Status: DISCONTINUED | OUTPATIENT
Start: 2022-01-01 | End: 2022-01-01

## 2022-01-01 RX ORDER — HALOPERIDOL 5 MG/ML
2 INJECTION INTRAMUSCULAR ONCE
Status: COMPLETED | OUTPATIENT
Start: 2022-01-01 | End: 2022-01-01

## 2022-01-01 RX ORDER — ATORVASTATIN CALCIUM 10 MG/1
10 TABLET, FILM COATED ORAL
Status: DISCONTINUED | OUTPATIENT
Start: 2022-01-01 | End: 2022-01-01

## 2022-01-01 RX ORDER — HALOPERIDOL 5 MG/ML
10 INJECTION INTRAMUSCULAR
Status: COMPLETED | OUTPATIENT
Start: 2022-01-01 | End: 2022-01-01

## 2022-01-01 RX ORDER — HALOPERIDOL 2 MG/ML
2 SOLUTION ORAL
Status: DISCONTINUED | OUTPATIENT
Start: 2022-01-01 | End: 2022-01-01 | Stop reason: HOSPADM

## 2022-01-01 RX ORDER — LORAZEPAM 2 MG/ML
1 CONCENTRATE ORAL
Status: DISCONTINUED | OUTPATIENT
Start: 2022-01-01 | End: 2022-01-01 | Stop reason: HOSPADM

## 2022-01-01 RX ORDER — LOSARTAN POTASSIUM 25 MG/1
25 TABLET ORAL DAILY
Status: DISCONTINUED | OUTPATIENT
Start: 2022-01-01 | End: 2022-01-01

## 2022-01-01 RX ORDER — FACIAL-BODY WIPES
10 EACH TOPICAL DAILY PRN
Status: DISCONTINUED | OUTPATIENT
Start: 2022-01-01 | End: 2022-07-07 | Stop reason: HOSPADM

## 2022-01-01 RX ORDER — MEMANTINE HYDROCHLORIDE 10 MG/1
10 TABLET ORAL 2 TIMES DAILY
COMMUNITY

## 2022-01-01 RX ORDER — RISPERIDONE 0.5 MG/1
0.5 TABLET, FILM COATED ORAL 2 TIMES DAILY
COMMUNITY
End: 2022-01-01

## 2022-01-01 RX ORDER — DIPHENHYDRAMINE HYDROCHLORIDE 50 MG/ML
50 INJECTION, SOLUTION INTRAMUSCULAR; INTRAVENOUS
Status: COMPLETED | OUTPATIENT
Start: 2022-01-01 | End: 2022-01-01

## 2022-01-01 RX ORDER — LORAZEPAM 2 MG/ML
1 INJECTION, SOLUTION INTRAMUSCULAR; INTRAVENOUS
Status: DISCONTINUED | OUTPATIENT
Start: 2022-01-01 | End: 2022-01-01 | Stop reason: SDUPTHER

## 2022-01-01 RX ADMIN — ATORVASTATIN CALCIUM 10 MG: 10 TABLET, FILM COATED ORAL at 23:15

## 2022-01-01 RX ADMIN — LORAZEPAM 2 MG: 2 INJECTION INTRAMUSCULAR; INTRAVENOUS at 10:01

## 2022-01-01 RX ADMIN — MORPHINE SULFATE 4 MG: 4 INJECTION, SOLUTION INTRAMUSCULAR; INTRAVENOUS at 03:17

## 2022-01-01 RX ADMIN — LORAZEPAM 2 MG: 2 INJECTION INTRAMUSCULAR; INTRAVENOUS at 02:11

## 2022-01-01 RX ADMIN — LORAZEPAM 1 MG: 2 INJECTION INTRAMUSCULAR; INTRAVENOUS at 01:08

## 2022-01-01 RX ADMIN — LORAZEPAM 1 MG: 2 INJECTION INTRAMUSCULAR; INTRAVENOUS at 00:49

## 2022-01-01 RX ADMIN — LORAZEPAM 2 MG: 2 INJECTION INTRAMUSCULAR; INTRAVENOUS at 18:19

## 2022-01-01 RX ADMIN — MEMANTINE HYDROCHLORIDE 10 MG: 10 TABLET ORAL at 08:12

## 2022-01-01 RX ADMIN — LORAZEPAM 2 MG: 2 INJECTION, SOLUTION INTRAMUSCULAR; INTRAVENOUS at 02:30

## 2022-01-01 RX ADMIN — HALOPERIDOL LACTATE 5 MG: 5 INJECTION, SOLUTION INTRAMUSCULAR at 06:18

## 2022-01-01 RX ADMIN — LORAZEPAM 2 MG: 2 INJECTION INTRAMUSCULAR; INTRAVENOUS at 10:13

## 2022-01-01 RX ADMIN — Medication 1 MG: at 21:43

## 2022-01-01 RX ADMIN — SODIUM CHLORIDE 75 ML/HR: 9 INJECTION, SOLUTION INTRAVENOUS at 15:45

## 2022-01-01 RX ADMIN — MORPHINE SULFATE 1 MG: 2 INJECTION, SOLUTION INTRAMUSCULAR; INTRAVENOUS at 00:50

## 2022-01-01 RX ADMIN — LORAZEPAM 2 MG: 2 INJECTION INTRAMUSCULAR; INTRAVENOUS at 04:00

## 2022-01-01 RX ADMIN — MORPHINE SULFATE 4 MG: 4 INJECTION, SOLUTION INTRAMUSCULAR; INTRAVENOUS at 18:20

## 2022-01-01 RX ADMIN — LORAZEPAM 2 MG: 2 INJECTION INTRAMUSCULAR; INTRAVENOUS at 12:07

## 2022-01-01 RX ADMIN — MEMANTINE HYDROCHLORIDE 10 MG: 10 TABLET ORAL at 09:07

## 2022-01-01 RX ADMIN — Medication 1 MG: at 20:42

## 2022-01-01 RX ADMIN — LORAZEPAM 2 MG: 2 INJECTION INTRAMUSCULAR; INTRAVENOUS at 12:41

## 2022-01-01 RX ADMIN — MEMANTINE HYDROCHLORIDE 10 MG: 10 TABLET ORAL at 10:25

## 2022-01-01 RX ADMIN — MORPHINE SULFATE 1 MG: 2 INJECTION, SOLUTION INTRAMUSCULAR; INTRAVENOUS at 02:31

## 2022-01-01 RX ADMIN — SODIUM CHLORIDE 50 ML/HR: 9 INJECTION, SOLUTION INTRAVENOUS at 11:59

## 2022-01-01 RX ADMIN — LOSARTAN POTASSIUM 25 MG: 25 TABLET, FILM COATED ORAL at 10:25

## 2022-01-01 RX ADMIN — HALOPERIDOL 2 MG: 2 SOLUTION ORAL at 14:20

## 2022-01-01 RX ADMIN — LORAZEPAM 1 MG: 2 INJECTION, SOLUTION INTRAMUSCULAR; INTRAVENOUS at 07:57

## 2022-01-01 RX ADMIN — LORAZEPAM 2 MG: 2 INJECTION INTRAMUSCULAR; INTRAVENOUS at 10:36

## 2022-01-01 RX ADMIN — LORAZEPAM 1 MG: 2 INJECTION INTRAMUSCULAR; INTRAVENOUS at 00:31

## 2022-01-01 RX ADMIN — LORAZEPAM 2 MG: 2 INJECTION INTRAMUSCULAR; INTRAVENOUS at 08:09

## 2022-01-01 RX ADMIN — LORAZEPAM 1 MG: 2 INJECTION, SOLUTION INTRAMUSCULAR; INTRAVENOUS at 14:14

## 2022-01-01 RX ADMIN — LORAZEPAM 2 MG: 2 INJECTION INTRAMUSCULAR; INTRAVENOUS at 16:06

## 2022-01-01 RX ADMIN — MEMANTINE HYDROCHLORIDE 10 MG: 10 TABLET ORAL at 17:14

## 2022-01-01 RX ADMIN — HYDROMORPHONE HYDROCHLORIDE 2 MG: 2 INJECTION, SOLUTION INTRAMUSCULAR; INTRAVENOUS; SUBCUTANEOUS at 10:01

## 2022-01-01 RX ADMIN — LORAZEPAM 2 MG: 2 INJECTION INTRAMUSCULAR; INTRAVENOUS at 18:20

## 2022-01-01 RX ADMIN — MORPHINE SULFATE 4 MG: 4 INJECTION, SOLUTION INTRAMUSCULAR; INTRAVENOUS at 06:01

## 2022-01-01 RX ADMIN — HYDROMORPHONE HYDROCHLORIDE 2 MG: 2 INJECTION, SOLUTION INTRAMUSCULAR; INTRAVENOUS; SUBCUTANEOUS at 14:09

## 2022-01-01 RX ADMIN — SODIUM CHLORIDE 75 ML/HR: 9 INJECTION, SOLUTION INTRAVENOUS at 21:26

## 2022-01-01 RX ADMIN — LORAZEPAM 2 MG: 2 INJECTION INTRAMUSCULAR; INTRAVENOUS at 08:13

## 2022-01-01 RX ADMIN — ENOXAPARIN SODIUM 40 MG: 100 INJECTION SUBCUTANEOUS at 15:17

## 2022-01-01 RX ADMIN — MORPHINE SULFATE 4 MG: 4 INJECTION, SOLUTION INTRAMUSCULAR; INTRAVENOUS at 14:20

## 2022-01-01 RX ADMIN — LORAZEPAM 2 MG: 2 INJECTION INTRAMUSCULAR; INTRAVENOUS at 05:55

## 2022-01-01 RX ADMIN — ATORVASTATIN CALCIUM 10 MG: 10 TABLET, FILM COATED ORAL at 21:54

## 2022-01-01 RX ADMIN — HALOPERIDOL LACTATE 2 MG: 5 INJECTION, SOLUTION INTRAMUSCULAR at 14:44

## 2022-01-01 RX ADMIN — LORAZEPAM 1 MG: 2 INJECTION, SOLUTION INTRAMUSCULAR; INTRAVENOUS at 18:30

## 2022-01-01 RX ADMIN — MORPHINE SULFATE 4 MG: 4 INJECTION, SOLUTION INTRAMUSCULAR; INTRAVENOUS at 04:05

## 2022-01-01 RX ADMIN — GLYCOPYRROLATE 0.2 MG: 0.2 INJECTION INTRAMUSCULAR; INTRAVENOUS at 22:30

## 2022-01-01 RX ADMIN — MORPHINE SULFATE 4 MG: 4 INJECTION, SOLUTION INTRAMUSCULAR; INTRAVENOUS at 08:05

## 2022-01-01 RX ADMIN — LORAZEPAM 2 MG: 2 INJECTION INTRAMUSCULAR; INTRAVENOUS at 20:04

## 2022-01-01 RX ADMIN — LORAZEPAM 2 MG: 2 INJECTION INTRAMUSCULAR; INTRAVENOUS at 06:48

## 2022-01-01 RX ADMIN — LORAZEPAM 1 MG: 2 INJECTION INTRAMUSCULAR; INTRAVENOUS at 01:32

## 2022-01-01 RX ADMIN — HALOPERIDOL 2 MG: 2 SOLUTION ORAL at 00:16

## 2022-01-01 RX ADMIN — HALOPERIDOL LACTATE 2 MG: 5 INJECTION, SOLUTION INTRAMUSCULAR at 16:47

## 2022-01-01 RX ADMIN — Medication 1 MG: at 15:39

## 2022-01-01 RX ADMIN — Medication 1 MG: at 16:05

## 2022-01-01 RX ADMIN — MORPHINE SULFATE 4 MG: 4 INJECTION, SOLUTION INTRAMUSCULAR; INTRAVENOUS at 04:00

## 2022-01-01 RX ADMIN — MORPHINE SULFATE 4 MG: 4 INJECTION, SOLUTION INTRAMUSCULAR; INTRAVENOUS at 03:05

## 2022-01-01 RX ADMIN — LORAZEPAM 1 MG: 2 INJECTION, SOLUTION INTRAMUSCULAR; INTRAVENOUS at 21:35

## 2022-01-01 RX ADMIN — LORAZEPAM 2 MG: 2 INJECTION INTRAMUSCULAR; INTRAVENOUS at 00:12

## 2022-01-01 RX ADMIN — GLYCOPYRROLATE 0.2 MG: 0.2 INJECTION INTRAMUSCULAR; INTRAVENOUS at 18:37

## 2022-01-01 RX ADMIN — MORPHINE SULFATE 4 MG: 4 INJECTION, SOLUTION INTRAMUSCULAR; INTRAVENOUS at 12:06

## 2022-01-01 RX ADMIN — LORAZEPAM 2 MG: 2 INJECTION INTRAMUSCULAR; INTRAVENOUS at 16:22

## 2022-01-01 RX ADMIN — LORAZEPAM 2 MG: 2 INJECTION INTRAMUSCULAR; INTRAVENOUS at 11:55

## 2022-01-01 RX ADMIN — LORAZEPAM 2 MG: 2 INJECTION INTRAMUSCULAR; INTRAVENOUS at 14:20

## 2022-01-01 RX ADMIN — QUETIAPINE FUMARATE 25 MG: 25 TABLET ORAL at 17:13

## 2022-01-01 RX ADMIN — GLYCOPYRROLATE 0.2 MG: 0.2 INJECTION INTRAMUSCULAR; INTRAVENOUS at 10:14

## 2022-01-01 RX ADMIN — LORAZEPAM 2 MG: 2 INJECTION INTRAMUSCULAR; INTRAVENOUS at 13:59

## 2022-01-01 RX ADMIN — LORAZEPAM 2 MG: 2 INJECTION INTRAMUSCULAR; INTRAVENOUS at 17:00

## 2022-01-01 RX ADMIN — HALOPERIDOL 2 MG: 2 SOLUTION ORAL at 19:49

## 2022-01-01 RX ADMIN — MORPHINE SULFATE 1 MG: 2 INJECTION, SOLUTION INTRAMUSCULAR; INTRAVENOUS at 01:08

## 2022-01-01 RX ADMIN — HALOPERIDOL LACTATE 5 MG: 5 INJECTION, SOLUTION INTRAMUSCULAR at 23:21

## 2022-01-01 RX ADMIN — SODIUM CHLORIDE 75 ML/HR: 9 INJECTION, SOLUTION INTRAVENOUS at 01:35

## 2022-01-01 RX ADMIN — ATORVASTATIN CALCIUM 10 MG: 10 TABLET, FILM COATED ORAL at 21:05

## 2022-01-01 RX ADMIN — MORPHINE SULFATE 1 MG: 2 INJECTION, SOLUTION INTRAMUSCULAR; INTRAVENOUS at 00:32

## 2022-01-01 RX ADMIN — LOSARTAN POTASSIUM 25 MG: 25 TABLET, FILM COATED ORAL at 08:12

## 2022-01-01 RX ADMIN — LOSARTAN POTASSIUM 25 MG: 25 TABLET, FILM COATED ORAL at 10:07

## 2022-01-01 RX ADMIN — HALOPERIDOL LACTATE 10 MG: 5 INJECTION, SOLUTION INTRAMUSCULAR at 22:28

## 2022-01-01 RX ADMIN — HYDROMORPHONE HYDROCHLORIDE 2 MG: 2 INJECTION, SOLUTION INTRAMUSCULAR; INTRAVENOUS; SUBCUTANEOUS at 12:41

## 2022-01-01 RX ADMIN — SODIUM CHLORIDE 75 ML/HR: 9 INJECTION, SOLUTION INTRAVENOUS at 16:02

## 2022-01-01 RX ADMIN — LORAZEPAM 1 MG: 2 INJECTION, SOLUTION INTRAMUSCULAR; INTRAVENOUS at 21:12

## 2022-01-01 RX ADMIN — MORPHINE SULFATE 4 MG: 4 INJECTION, SOLUTION INTRAMUSCULAR; INTRAVENOUS at 01:35

## 2022-01-01 RX ADMIN — MORPHINE SULFATE 4 MG: 4 INJECTION, SOLUTION INTRAMUSCULAR; INTRAVENOUS at 20:00

## 2022-01-01 RX ADMIN — HALOPERIDOL LACTATE 5 MG: 5 INJECTION, SOLUTION INTRAMUSCULAR at 17:58

## 2022-01-01 RX ADMIN — WATER 20 MG: 1 INJECTION INTRAMUSCULAR; INTRAVENOUS; SUBCUTANEOUS at 02:57

## 2022-01-01 RX ADMIN — MORPHINE SULFATE 4 MG: 4 INJECTION, SOLUTION INTRAMUSCULAR; INTRAVENOUS at 00:12

## 2022-01-01 RX ADMIN — LORAZEPAM 1 MG: 2 INJECTION, SOLUTION INTRAMUSCULAR; INTRAVENOUS at 21:13

## 2022-01-01 RX ADMIN — MEMANTINE HYDROCHLORIDE 10 MG: 10 TABLET ORAL at 17:56

## 2022-01-01 RX ADMIN — LORAZEPAM 2 MG: 2 INJECTION INTRAMUSCULAR; INTRAVENOUS at 22:30

## 2022-01-01 RX ADMIN — LORAZEPAM 1 MG: 2 INJECTION, SOLUTION INTRAMUSCULAR; INTRAVENOUS at 21:23

## 2022-01-01 RX ADMIN — LORAZEPAM 2 MG: 2 INJECTION INTRAMUSCULAR; INTRAVENOUS at 02:15

## 2022-01-01 RX ADMIN — MORPHINE SULFATE 4 MG: 4 INJECTION, SOLUTION INTRAMUSCULAR; INTRAVENOUS at 10:13

## 2022-01-01 RX ADMIN — LORAZEPAM 2 MG: 2 INJECTION INTRAMUSCULAR; INTRAVENOUS at 20:00

## 2022-01-01 RX ADMIN — DIPHENHYDRAMINE HYDROCHLORIDE 50 MG: 50 INJECTION, SOLUTION INTRAMUSCULAR; INTRAVENOUS at 22:30

## 2022-01-01 RX ADMIN — LORAZEPAM 2 MG: 2 INJECTION INTRAMUSCULAR; INTRAVENOUS at 14:09

## 2022-01-01 RX ADMIN — GLYCOPYRROLATE 0.2 MG: 0.2 INJECTION INTRAMUSCULAR; INTRAVENOUS at 05:55

## 2022-01-01 RX ADMIN — LORAZEPAM 2 MG: 2 INJECTION INTRAMUSCULAR; INTRAVENOUS at 06:01

## 2022-01-01 RX ADMIN — LORAZEPAM 2 MG: 2 INJECTION, SOLUTION INTRAMUSCULAR; INTRAVENOUS at 22:30

## 2022-01-01 RX ADMIN — LORAZEPAM 2 MG: 2 INJECTION INTRAMUSCULAR; INTRAVENOUS at 04:05

## 2022-01-01 RX ADMIN — MORPHINE SULFATE 4 MG: 4 INJECTION, SOLUTION INTRAMUSCULAR; INTRAVENOUS at 02:12

## 2022-01-01 RX ADMIN — LORAZEPAM 1 MG: 2 INJECTION, SOLUTION INTRAMUSCULAR; INTRAVENOUS at 05:56

## 2022-01-01 RX ADMIN — MORPHINE SULFATE 1 MG: 2 INJECTION, SOLUTION INTRAMUSCULAR; INTRAVENOUS at 01:31

## 2022-01-01 RX ADMIN — ENOXAPARIN SODIUM 40 MG: 100 INJECTION SUBCUTANEOUS at 17:01

## 2022-01-01 RX ADMIN — LORAZEPAM 2 MG: 2 INJECTION INTRAMUSCULAR; INTRAVENOUS at 06:06

## 2022-01-01 RX ADMIN — MEMANTINE HYDROCHLORIDE 10 MG: 10 TABLET ORAL at 17:13

## 2022-01-01 RX ADMIN — MEMANTINE HYDROCHLORIDE 10 MG: 10 TABLET ORAL at 18:00

## 2022-01-01 RX ADMIN — ATORVASTATIN CALCIUM 10 MG: 10 TABLET, FILM COATED ORAL at 21:04

## 2022-01-01 RX ADMIN — HALOPERIDOL LACTATE 5 MG: 5 INJECTION, SOLUTION INTRAMUSCULAR at 12:07

## 2022-01-01 RX ADMIN — LORAZEPAM 1 MG: 2 INJECTION, SOLUTION INTRAMUSCULAR; INTRAVENOUS at 16:47

## 2022-01-01 RX ADMIN — ENOXAPARIN SODIUM 40 MG: 100 INJECTION SUBCUTANEOUS at 17:12

## 2022-01-01 RX ADMIN — QUETIAPINE FUMARATE 25 MG: 25 TABLET ORAL at 21:54

## 2022-01-01 RX ADMIN — ENOXAPARIN SODIUM 40 MG: 100 INJECTION SUBCUTANEOUS at 17:00

## 2022-01-01 RX ADMIN — LORAZEPAM 2 MG: 2 INJECTION INTRAMUSCULAR; INTRAVENOUS at 08:05

## 2022-01-01 RX ADMIN — HYDROMORPHONE HYDROCHLORIDE 2 MG: 2 INJECTION, SOLUTION INTRAMUSCULAR; INTRAVENOUS; SUBCUTANEOUS at 11:55

## 2022-01-01 RX ADMIN — GLYCOPYRROLATE 0.2 MG: 0.2 INJECTION INTRAMUSCULAR; INTRAVENOUS at 02:11

## 2022-01-01 RX ADMIN — HYDROMORPHONE HYDROCHLORIDE 2 MG: 2 INJECTION, SOLUTION INTRAMUSCULAR; INTRAVENOUS; SUBCUTANEOUS at 10:50

## 2022-01-01 RX ADMIN — QUETIAPINE FUMARATE 25 MG: 25 TABLET ORAL at 08:12

## 2022-01-01 RX ADMIN — MORPHINE SULFATE 4 MG: 4 INJECTION, SOLUTION INTRAMUSCULAR; INTRAVENOUS at 16:22

## 2022-01-01 RX ADMIN — HYDROMORPHONE HYDROCHLORIDE 2 MG: 2 INJECTION, SOLUTION INTRAMUSCULAR; INTRAVENOUS; SUBCUTANEOUS at 20:04

## 2022-01-01 RX ADMIN — MORPHINE SULFATE 4 MG: 4 INJECTION, SOLUTION INTRAMUSCULAR; INTRAVENOUS at 08:08

## 2022-01-01 RX ADMIN — LORAZEPAM 1 MG: 2 INJECTION, SOLUTION INTRAMUSCULAR; INTRAVENOUS at 01:25

## 2022-01-01 RX ADMIN — MAGNESIUM SULFATE HEPTAHYDRATE 2 G: 40 INJECTION, SOLUTION INTRAVENOUS at 15:17

## 2022-01-01 RX ADMIN — LORAZEPAM 2 MG: 2 INJECTION INTRAMUSCULAR; INTRAVENOUS at 18:40

## 2022-01-01 RX ADMIN — LORAZEPAM 2 MG: 2 INJECTION INTRAMUSCULAR; INTRAVENOUS at 03:04

## 2022-01-01 RX ADMIN — ENOXAPARIN SODIUM 40 MG: 100 INJECTION SUBCUTANEOUS at 16:05

## 2022-01-01 RX ADMIN — LORAZEPAM 2 MG: 2 INJECTION INTRAMUSCULAR; INTRAVENOUS at 22:00

## 2022-01-01 RX ADMIN — HALOPERIDOL 2 MG: 2 SOLUTION ORAL at 12:05

## 2022-01-01 RX ADMIN — MEMANTINE HYDROCHLORIDE 10 MG: 10 TABLET ORAL at 08:48

## 2022-01-01 RX ADMIN — HALOPERIDOL LACTATE 2 MG: 5 INJECTION, SOLUTION INTRAMUSCULAR at 11:33

## 2022-01-01 RX ADMIN — SODIUM CHLORIDE 50 ML/HR: 9 INJECTION, SOLUTION INTRAVENOUS at 18:33

## 2022-01-01 RX ADMIN — MORPHINE SULFATE 4 MG: 4 INJECTION, SOLUTION INTRAMUSCULAR; INTRAVENOUS at 22:30

## 2022-01-01 RX ADMIN — HALOPERIDOL LACTATE 5 MG: 5 INJECTION, SOLUTION INTRAMUSCULAR at 21:00

## 2022-01-01 RX ADMIN — HYDROMORPHONE HYDROCHLORIDE 2 MG: 2 INJECTION, SOLUTION INTRAMUSCULAR; INTRAVENOUS; SUBCUTANEOUS at 16:06

## 2022-01-01 RX ADMIN — HALOPERIDOL LACTATE 2 MG: 5 INJECTION, SOLUTION INTRAMUSCULAR at 01:24

## 2022-01-01 RX ADMIN — MEMANTINE HYDROCHLORIDE 10 MG: 10 TABLET ORAL at 17:54

## 2022-01-01 RX ADMIN — LORAZEPAM 2 MG: 2 INJECTION INTRAMUSCULAR; INTRAVENOUS at 23:53

## 2022-01-01 RX ADMIN — MEMANTINE HYDROCHLORIDE 10 MG: 10 TABLET ORAL at 10:07

## 2022-01-01 RX ADMIN — MORPHINE SULFATE 4 MG: 4 INJECTION, SOLUTION INTRAMUSCULAR; INTRAVENOUS at 06:48

## 2022-01-01 RX ADMIN — LORAZEPAM 1 MG: 2 INJECTION, SOLUTION INTRAMUSCULAR; INTRAVENOUS at 14:33

## 2022-01-01 RX ADMIN — HYDROMORPHONE HYDROCHLORIDE 2 MG: 2 INJECTION, SOLUTION INTRAMUSCULAR; INTRAVENOUS; SUBCUTANEOUS at 18:19

## 2022-01-01 RX ADMIN — LORAZEPAM 1 MG: 2 INJECTION INTRAMUSCULAR; INTRAVENOUS at 02:31

## 2022-01-01 RX ADMIN — QUETIAPINE FUMARATE 25 MG: 25 TABLET ORAL at 21:06

## 2022-01-01 RX ADMIN — MEMANTINE HYDROCHLORIDE 10 MG: 10 TABLET ORAL at 17:30

## 2022-01-01 RX ADMIN — LORAZEPAM 1 MG: 2 INJECTION, SOLUTION INTRAMUSCULAR; INTRAVENOUS at 13:24

## 2022-01-01 RX ADMIN — MORPHINE SULFATE 4 MG: 4 INJECTION, SOLUTION INTRAMUSCULAR; INTRAVENOUS at 05:55

## 2022-01-01 RX ADMIN — Medication 1 MG: at 23:43

## 2022-01-01 RX ADMIN — HALOPERIDOL LACTATE 2 MG: 5 INJECTION, SOLUTION INTRAMUSCULAR at 06:20

## 2022-01-01 RX ADMIN — MORPHINE SULFATE 1 MG: 2 INJECTION, SOLUTION INTRAMUSCULAR; INTRAVENOUS at 02:16

## 2022-01-01 RX ADMIN — Medication 1 MG: at 20:33

## 2022-01-01 RX ADMIN — HALOPERIDOL LACTATE 2 MG: 5 INJECTION, SOLUTION INTRAMUSCULAR at 20:11

## 2022-01-01 RX ADMIN — LORAZEPAM 1 MG: 2 INJECTION INTRAMUSCULAR; INTRAVENOUS at 02:16

## 2022-01-01 RX ADMIN — LOSARTAN POTASSIUM 25 MG: 25 TABLET, FILM COATED ORAL at 09:07

## 2022-06-24 PROBLEM — R45.1 AGITATION: Status: ACTIVE | Noted: 2022-01-01

## 2022-06-24 NOTE — ED NOTES
Pt attempting to get out of bed, hitting staff members and taking off monitoring.  RN reached out to MD for further orders

## 2022-06-24 NOTE — CONSULTS
Blanca Wilkins MD, [address] [address]  Jackie Webb 32  [phone]    Date of  Admission: 6/23/2022  9:49 PM         IMPRESSION and RECOMMENDATIONS     1.  AFib:  Unknown duration. Doubt any utility in initiating anticoagulation given severe dementia. Tendency to bradycardia due to underlying conduction system disease (LBBB): stop coreg. No other Earth-shattering recs. Aggressive comfort care. DNR. Will see prn. Usually sees Dr. Cristobal Jones. Discussed with wife. Problem List  Date Reviewed: 4/3/2018    None          History of Present Illness:     Kindra Lisa is a 79 y.o. male with the above problem list who was admitted for No admission diagnoses are documented for this encounter. .    This is a 69-year-old male with a past medical history significant for dementia who presents to the ER by EMS for evaluation for increased agitation, confusion and aggressive behavior that began this evening. The patient was at with his wife who called 911 because he could not be controlled and he has been throwing things around the house, aggressive towards her. EMS was called and the patient had to be given 5 mg of Versed IM for transport to the ER for further evaluation. The patient is unable to give any history at this time. Most of history was obtained from EMS crew. The patient appears to be alert, oriented to self, ambulate without any help and able to take care of his ADLs at times. He is sedate. No current facility-administered medications for this encounter. Current Outpatient Medications   Medication Sig    memantine (Namenda) 10 mg tablet Take 10 mg by mouth two (2) times a day.  risperiDONE (RisperDAL) 0.5 mg tablet Take 0.5 mg by mouth two (2) times a day.  carvedilol (COREG) 12.5 mg tablet Take 6.25 mg by mouth two (2) times daily (with meals).  losartan (COZAAR) 25 mg tablet Take 25 mg by mouth daily.  lovastatin (MEVACOR) 20 mg tablet Take 20 mg by mouth daily. No Known Allergies   Family History   Problem Relation Age of Onset    Cancer Mother       Social History     Socioeconomic History    Marital status:      Spouse name: Not on file    Number of children: Not on file    Years of education: Not on file    Highest education level: Not on file   Occupational History    Not on file   Tobacco Use    Smoking status: Former Smoker    Smokeless tobacco: Never Used   Substance and Sexual Activity    Alcohol use: Not on file    Drug use: Not on file    Sexual activity: Not on file   Other Topics Concern    Not on file   Social History Narrative    Not on file     Social Determinants of Health     Financial Resource Strain:     Difficulty of Paying Living Expenses: Not on file   Food Insecurity:     Worried About [address] [employer] in the Last Year: Not on file    [employer] in the Last Year: Not on file   Transportation Needs:     Lack of Transportation (Medical): Not on file    Lack of Transportation (Non-Medical):  Not on file   Physical Activity:     Days of Exercise per Week: Not on file    Minutes of Exercise per Session: Not on file   Stress:     Feeling of Stress : Not on file   Social Connections:     Frequency of Communication with Friends and Family: Not on file    Frequency of Social Gatherings with Friends and Family: Not on file    Attends Judaism Services: Not on file    Active Member of Clubs or Organizations: Not on file    Attends Club or Organization Meetings: Not on file    Marital Status: Not on file   Intimate Partner Violence:     Fear of Current or Ex-Partner: Not on file    Emotionally Abused: Not on file    Physically Abused: Not on file    Sexually Abused: Not on file   Housing Stability:     Unable to Pay for Housing in the Last Year: Not on file    Number of [place] in the Last Year: Not on file    Unstable Housing in the Last Year: Not on file       Physical Exam:     Patient Vitals for the past 16 hrs:   BP Temp Pulse Resp SpO2 Weight   06/24/22 0821 -- -- (!) 51 13 100 % --   06/24/22 0806 127/81 -- (!) 39 10 100 % --   06/24/22 0736 (!) 145/75 -- (!) 39 11 100 % --   06/24/22 0712 -- -- (!) 55 15 100 % --   06/24/22 0706 (!) 147/71 -- (!) 57 15 (!) 88 % --   06/24/22 0700 -- -- (!) 52 -- -- --   06/24/22 0630 (!) 119/51 -- 60 14 93 % --   06/24/22 0504 114/70 -- (!) 51 16 96 % --   06/24/22 0130 136/77 -- (!) 56 16 99 % --   06/24/22 0000 130/85 -- (!) 57 18 100 % --   06/23/22 2330 (!) 167/86 -- (!) 59 11 95 % --   06/23/22 2317 -- -- 64 -- -- --   06/23/22 2222 -- -- 73 -- -- --   06/23/22 2210 -- -- -- -- -- 92 kg (202 lb 12.8 oz)   06/23/22 2157 137/70 98.6 °F (37 °C) (!) 57 18 98 % --       HEENT Exam:     Normocephalic, atraumatic. EOMI. Oropharynx negative. Neck supple. No lymphadenopathy. Lung Exam:     The patient is not dyspneic. There is no cough. Breath sounds are heard equally in all lung fields. There are no wheezes, rales, rhonchi, or rubs heard on auscultation. Heart Exam:     The rhythm is irregularly irregular. The PMI is in the 5th intercostal space of the MCL. Apical impulse is normal. S1 is regular. S2 is physiologic. There is no S3, S4 gallop, murmur, click, or rub. Abdomen Exam:     Bowel sounds are normoactive. Abdomen soft in all quadrants. No tenderness. No palpable masses. No organomegaly. No hernias noted. No bruits or pulsatile mass. Extremities Exam:     The extremities are atraumatic appearing. There is no clubbing, cyanosis, edema, ulcers, varicose veins, rash, erythemia noted in the extremities. The neurovascular status is grossly intact with normal distal sensation and pulses. Vascular Exam:     The radial, brachial, dorsalis pedis, posterior tibial, are equal and strong bilaterally The carotids are equal bilaterally without bruits.           Labs:     Lab Results   Component Value Date/Time    Glucose 97 06/23/2022 10:44 PM    Sodium 138 06/23/2022 10:44 PM    Potassium 4.0 06/23/2022 10:44 PM    Chloride 105 06/23/2022 10:44 PM    CO2 24 06/23/2022 10:44 PM    BUN 11 06/23/2022 10:44 PM    Creatinine 0.70 06/23/2022 10:44 PM    Calcium 8.7 06/23/2022 10:44 PM     Recent Labs     06/23/22  2244   WBC 6.1   HGB 13.9   HCT 42.0        Recent Labs     06/23/22  2244   ALT 17   AP 82   TBILI 1.0   TP 6.5   ALB 3.5   GLOB 3.0     No results for input(s): INR, PTP, APTT, INREXT in the last 72 hours. No results for input(s): CPK, CKMB, TNIPOC in the last 72 hours. No lab exists for component: TROPONINI, ITNL  No results for input(s): TROIQ in the last 72 hours.   No results found for: CHOL, CHOLX, CHLST, CHOLV, HDL, HDLP, LDL, LDLC, DLDLP, TGLX, TRIGL, TRIGP, CHHD, CHHDX    EKG:  AF @ 41, LBBB

## 2022-06-24 NOTE — ED NOTES
Pt continues to be calm and appears to be resting comfortably in bed at this time    Remains on continuous monitoring and sitter at bedside

## 2022-06-24 NOTE — ED NOTES
Pt remains stable with no distress noted. respirations even and unlabored.  Remains on continuous pulse ox and cardiac monitoring

## 2022-06-24 NOTE — ED NOTES
Pt awake and agitated at this time. Pt attempting to get out of bed. Attempted to re-direct pt at this time without success. MD aware.

## 2022-06-24 NOTE — ED NOTES
While attempting to obtain IV access, pt combative and attempting to fight staff and bite staff. Multiple staff members necessary to obtain IV access. MD at bedside and aware of situation. Pt placed in bilateral soft wrist restrains at this time.

## 2022-06-24 NOTE — BSMART NOTE
Attending Nurse Reji Nichols attempted to wake patient to attempt assessment but patient was given Ativan due to aggression. Reji Nichols will call this  when patient wakes up.

## 2022-06-24 NOTE — ED NOTES
Pt bed alarm going off and pt seen attempting to get out of bed. Pt redirected and began hitting RN and ripping off everything.  PRN medications to be given

## 2022-06-24 NOTE — BSMART NOTE
Dr. Urbano Jean called this writer to request [facility] be contacted due to patient's aggression. As reported when the patient wakes he continues to be aggressive. Attending ED and attending nurse verbalized that patient does not have capacity at this time. This writer called [facility] at 6:14am and awaiting call back from a Clinician.

## 2022-06-24 NOTE — ED NOTES
Pt facesheet, EKG, and lab results faxed to [phone] at this time as [facility] by [facility].

## 2022-06-24 NOTE — ED NOTES
Bilateral soft wrist restraints removed. Sitter at bedside. Pt continues to be aggressive with staff when aroused. Pt sleeping between care.

## 2022-06-24 NOTE — ED TRIAGE NOTES
Pt presents to ED via EMS from home. Became agitated, confused, started breaking things at home. Vitals stable. EMS gave 5mg IM haldol. Pt arrived in bilateral soft wrist restraints, was attempting to bite EMS.

## 2022-06-24 NOTE — ED NOTES
This RN responded to bed alarm, pt standing at foot of bed at this time. Attempting to re-direct pt.

## 2022-06-24 NOTE — H&P
Wilfredo Kuhn [facility] [address] 78  HISTORY AND PHYSICAL    Name:  Daniel Roca  MR#:  [id number]  :  1940  ACCOUNT #:  [de-identified]  ADMIT DATE:  2022      PRIMARY CARE PHYSICIAN:  Unknown. PRESENTING COMPLAINT:  Agitation. HISTORY OF PRESENT ILLNESS:      The patient is an 50-year-old gentleman, who has significant dementia. The patient lives with his wife at home. He also has history of hypertension and hyperlipidemia. The patient is currently on a beta blocker. He recently have become very agitated. Yesterday, he got more confused and had aggressive behavior. His wife called 911 as she could not control him. In the ambulance, the patient was given 5 mg of Versed and brought the patient here. In the emergency room, the patient has received 4 mg of Ativan, 10 mg of Haldol, and 50 mg of Benadryl and we are asked to admit him. At this time, the patient is pretty somnolent and he is unable to give me any history. His wife is on the bedside. She tells me that he rarely goes out of his house because of advanced dementia. He has no chest pain. In the emergency room, the patient was also found to be bradycardia; however, it seems like the patient is on beta-blocker. No new medications per his wife. PAST MEDICAL HISTORY:      1. Advanced dementia. 2.  Hypertension. 3.  Hyperlipidemia. SOCIOECONOMIC HISTORY:      The patient does not smoke or drink. He lives with his wife. Code status is do not resuscitate per wife. ALLERGIES:  UNKNOWN. CURRENT MEDICATIONS:  His current medications at home include;  1. Losartan 25 mg daily. 2.  Lovastatin 20 mg daily. 3.  Coreg 6.25 mg b.i.d.  4.  Namenda 10 mg twice daily. 5.  Risperidone 0.5 mg twice daily. FAMILY HISTORY:  Significant for some kind of cancer. REVIEW OF SYSTEMS:  Cannot be done because of the patient's condition.       Code Status:  DNR    PHYSICAL EXAMINATION:  GENERAL:  The patient is an 50-year-old gentleman who is not in any acute distress. He is somnolent. VITAL SIGNS:  Temperature 98.6, blood pressure 127/81, heart rate goes down as low as in low 30s. Respiratory rate is 13. Saturation is 100%. HEENT:  Pupils equally reactive to light and accommodation. NECK:  Supple. There is no lymphadenopathy or JVD. CHEST:  Chest is clear. No wheezing. No crackles. CARDIOVASCULAR:  S1 and S2.  Bradycardic. No murmur. Irregular. ABDOMEN:  No tenderness. No guarding or rigidity. EXTREMITIES:  No pedal edema. CNS:  The patient is well sedated, unable to do exam at this time. LABORATORY DATA IN ER:  CBC:  White count of 6.1, hemoglobin is 13.9, hematocrit is 42, platelet count is 234,764. Chemistry:  Sodium 138, potassium is 4, chloride is 105, bicarb is 24, gap of 9, glucose 95, BUN is 11, creatinine 0.7, calcium is 8.7, phosphorus is 3.1, bilirubin is 1, protein is 6.5, albumin is 3.5, globulin is 3. ALT 17, AST 21, alk phos 82. Urine analysis is unremarkable. Urine tox screen is unremarkable. EKG shows atrial fibrillation with left bundle-branch block. The patient does have type 2 block. Chest x-ray is unremarkable. ASSESSMENT AND PLAN:      The patient is an 44-year-old gentleman, who has advanced dementia, currently on Namenda and risperidone, is being admitted for;    1. Dementia with aggression. We will consult Psychiatry. I will add some Haldol and Ativan. At this time, he is pretty well sedated. 2.  History of hypertension. Discontinue Coreg because of significant bradycardia. 3.  History of dementia. Continue Namenda. 4.  History of hyperlipidemia, on statin which will be continued. 5.  Afib with Bradycardia. Duration unknown. Cardiology has been asked to see him. Hopefully, his heart rate will come up with holding beta-blockers. The patient will be monitored on telemetry. 6.  Deep venous thrombosis prophylaxis.     May need to be placed in memory care case management consulted      MD ENRIQUE Padgett/[email or web address]  D:  06/24/2022 9:52  T:  06/24/2022 10:56  JOB #:  [id number]

## 2022-06-24 NOTE — ED NOTES
Pt appears to be resting comfortably in bed at this time, remains on continuous monitoring.  Wife aware of plan of care

## 2022-06-24 NOTE — ED NOTES
Pt appears to be resting comfortably in bed at this time. Remains on continuous monitoring.  Bed alarm continues to be in use

## 2022-06-24 NOTE — ED NOTES
RN taking over care at this time. Pt remains on continuous monitoring. No longer in restraints.  Sitter at bedside     Awaiting [facility]

## 2022-06-24 NOTE — ED PROVIDER NOTES
This is a 79-year-old male with a past medical history significant for dementia who presents to the ER by EMS for evaluation for increased agitation, confusion and aggressive behavior that began this evening. The patient was at with his wife who called 911 because he could not be controlled and he has been throwing things around the house, aggressive towards her. EMS was called and the patient had to be given 5 mg of Versed IM for transport to the ER for further evaluation. The patient is unable to give any history at this time. Most of history was obtained from EMS crew. The patient appears to be alert, oriented to self, ambulate without any help and able to take care of his ADLs at times. Past Medical History:   Diagnosis Date    Skin cancer        Past Surgical History:   Procedure Laterality Date    HX MOHS PROCEDURES  03/27/2018    BCC R scaphoid fossa by Dr. Meliton Fualkner          Family History:   Problem Relation Age of Onset    Cancer Mother        Social History     Socioeconomic History    Marital status:      Spouse name: Not on file    Number of children: Not on file    Years of education: Not on file    Highest education level: Not on file   Occupational History    Not on file   Tobacco Use    Smoking status: Former Smoker    Smokeless tobacco: Never Used   Substance and Sexual Activity    Alcohol use: Not on file    Drug use: Not on file    Sexual activity: Not on file   Other Topics Concern    Not on file   Social History Narrative    Not on file     Social Determinants of Health     Financial Resource Strain:     Difficulty of Paying Living Expenses: Not on file   Food Insecurity:     Worried About [address] in the Last Year: Not on file    [employer] in the Last Year: Not on file   Transportation Needs:     Lack of Transportation (Medical): Not on file    Lack of Transportation (Non-Medical):  Not on file   Physical Activity:     Days of Exercise per Week: Not on file    Minutes of Exercise per Session: Not on file   Stress:     Feeling of Stress : Not on file   Social Connections:     Frequency of Communication with Friends and Family: Not on file    Frequency of Social Gatherings with Friends and Family: Not on file    Attends Mormon Services: Not on file    Active Member of [employer] or Organizations: Not on file    Attends Club or Organization Meetings: Not on file    Marital Status: Not on file   Intimate Partner Violence:     Fear of Current or Ex-Partner: Not on file    Emotionally Abused: Not on file    Physically Abused: Not on file    Sexually Abused: Not on file   Housing Stability:     Unable to Pay for Housing in the Last Year: Not on file    Number of [place] in the Last Year: Not on file    Unstable Housing in the Last Year: Not on file         ALLERGIES: Patient has no known allergies. Review of Systems   All other systems reviewed and are negative. Vitals:    06/23/22 2317 06/23/22 2330 06/24/22 0000 06/24/22 0130   BP:  (!) 167/86 130/85 136/77   Pulse: 64 (!) 59 (!) 57 (!) 56   Resp:  11 18 16   Temp:       SpO2:  95% 100% 99%   Weight:                Physical Exam  Vitals and nursing note reviewed. CONSTITUTIONAL: Well-appearing; well-nourished; in no apparent distress  HEAD: Normocephalic; atraumatic  EYES: PERRL; EOM intact; conjunctiva and sclera are clear bilaterally. ENT: No rhinorrhea; normal pharynx with no tonsillar hypertrophy; mucous membranes pink/moist, no erythema, no exudate. NECK: Supple; non-tender; no cervical lymphadenopathy  CARD: Normal S1, S2; no murmurs, rubs, or gallops. Regular rate and rhythm. RESP: Normal respiratory effort; breath sounds clear and equal bilaterally; no wheezes, rhonchi, or rales. ABD: Normal bowel sounds; non-distended; non-tender; no palpable organomegaly, no masses, no bruits.   Back Exam: Normal inspection; no vertebral point tenderness, no CVA tenderness. Normal range of motion. EXT: Normal ROM in all four extremities; non-tender to palpation; no swelling or deformity; distal pulses are normal, no edema. SKIN: Warm; dry; no rash. NEURO:Alert and oriented xself, coherent, VANESSA-XII grossly intact, sensory and motor are non-focal.        MDM  Number of Diagnoses or Management Options  Diagnosis management comments: Assessment: 79-year-old male who presents to the ER for evaluation for agitation, confusion and disorientation with history of dementia who appears hemodynamically stable. He did evaluation for acute delirium rule out electrolyte abnormality, infection and exacerbation of dementia. Plan: EKG/chest x-ray/lab/IV fluid/chemical sedation as well as physical sedation with soft restraint/education, reassurance, symptomatic treatment/ Monitor and Reevaluate. Amount and/or Complexity of Data Reviewed  Clinical lab tests: ordered and reviewed  Tests in the radiology section of CPT®: ordered and reviewed  Tests in the medicine section of CPT®: reviewed and ordered  Discussion of test results with the performing providers: yes  Decide to obtain previous medical records or to obtain history from someone other than the patient: yes  Obtain history from someone other than the patient: yes  Review and summarize past medical records: yes  Discuss the patient with other providers: yes  Independent visualization of images, tracings, or specimens: yes    Risk of Complications, Morbidity, and/or Mortality  Presenting problems: moderate  Diagnostic procedures: moderate  Management options: moderate           Procedures    ED EKG interpretation:  Rhythm: atrial fib; and regular . Rate (approx.): 64; Axis: left axis deviation; QRS interval: prolonged; ST/T wave: non-specific changes; in  Lead: Diffusely; LBBB; Other findings: abnormal ekg. This EKG was interpreted by Sánchez Irene MD,ED Provider.     XRAY INTERPRETATION (ED MD)  Chest Xray  No acute process seen. Normal heart size. No bony abnormalities. No infiltrate. Dre Forbes MD 11:34 PM      Restraint type: Soft restraint:  right wrist and left wrist  Reason for restraints: Violent/Aggressive  Duration: 4 hours    Restraints must be removed when an alternative is available and effective and/or patient no longer meets criteria. Orders must be renewed every calendar day or when discontinued. The MD must conduct a face to face assessment within 1 calendar day of initiation when initial restraint order is verbal.  11:15 PM    Violent Restraint Face-to-Face Evaluation  (must be completed within one hour of initiation of restraints)      Evaluate immediate situation:  Violent aggressive behavior now asleep and arousable    Reaction to intervention: Asleep and restraint    Medical Condition/Assessment: Stable ongoing    Behavioral Condition/Assessment: Stable and will require evaluation for increased agitation and exacerbation of dementia    The patients review of systems, history, medications, and recent labs were reviewed at this time. Continue/Discontinue restraints at this time: Discontinue    One-Hour Review Evaluation Physician Notification:    Provider Notified (Name): Nadine Saucedo     date June 24, 2022     time  00:10AM  Physician or Designated One-Hour Reviewer: Nadine Saucedo MD       PROGRESS NOTE:  Pt has been reexamined by rDe Forbes MD all available results have been reviewed with pt and any available family. The patient became again increasingly agitated, combative and abusive to staff with grabbing and scratching. He refused to follow commands as requested. .  We will give Ativan 2 mg IV. Written by Nadine Saucedo MD,  02:41 AM    PROGRESS NOTE:  Consult placed To BST who will evaluate the patient for psych recommendation at this time. The patient is medically clear. .    Written by Nadine Saucedo MD,  02:42 AM    PROGRESS NOTE:  Pt has been reexamined by Dre Forbes MD all available results have been reviewed with pt and any available family. The patient has become progressively bradycardic while asleep and arousable. Will repeat EKG consult cardiology  Written by Sánchez Irene MD,  06:00 AM    ED EKG interpretation:  Rhythm: Junctional bradycardia; and irregular. Rate (approx.): 41; Axis: left axis deviation; P wave: prolonged; QRS interval: prolonged; ST/T wave: non-specific changes; in  Lead: Diffusely; Other findings: abnormal ekg. This EKG was interpreted by Sánchez Irene MD,ED Provider. 08:10AM    CONSULT NOTE:  Adis Muniz MD spoke with Dr. Asael Watt of Cardiology Discussed patient's presentation, history, physical assessment, and available diagnostic results. Will come and see the patient in the ED. CONSULT NOTE:  Adis Muniz MD spoke with Dr. Radha Leal Discussed patient's presentation, history, physical assessment, and available diagnostic results. Will come and see the patient in the ED. Perfect Serve Consult for Admission  9:02 AM    ED Room Number: ER04/04  Patient Name and age:  Dianne Luther 79 y.o.  male  Working Diagnosis:   1. Vascular dementia with behavior disturbance ([address]    2. Agitation    3. AV junctional bradycardia    4. Second degree AV block, Mobitz type I        COVID-19 Suspicion:  no  Sepsis present:  no  Reassessment needed: no  Code Status:  Full Code  Readmission: no  Isolation Requirements:  no  Recommended Level of Care:  telemetry  Department: [address] [place] [address] ED - [phone]  Admitting Provider: Dr. Radha Leal    Other: Patient required sedation for increased agitation aggressive behavior towards staff. Cardiology has been notified and will see the patient in the ED    Total critical care time spent exclusive of procedures:  75 minutes. .     .     .

## 2022-06-24 NOTE — BSMART NOTE
This writer spoke with [place] from [address] who will call hospital and discuss patient and moving forward.

## 2022-06-25 NOTE — ED NOTES
Assumed care of pt at this time. Pt resting on stretcher in a position of comfort. Resps are even and unlabored. Skin warm and dry. No distress noted. Wife at bedside, speaking with case management.

## 2022-06-25 NOTE — ED NOTES
Patient wife at bedside. Updated on events overnight. Case management consult placed. Called case management and updated on patient. Dr. Keesha Reynolds notified. Psych consult called again due to patient not being seen by Psychiatry yet.

## 2022-06-25 NOTE — ED NOTES
Pt waking up at this time. Pt fidgeting with sheets and mitts, attempting to pull them off. Wife at bedside, was instructed to try and talk to him or perhaps provide him with music that he would enjoy.

## 2022-06-25 NOTE — ED NOTES
Pt confused and combative. Airway is patent. Respirations are even and non-labored. Pt given additional haldol dose, per Dr. Stacia Gan. Sitter at [facility] '' . Will con't to monitor.

## 2022-06-25 NOTE — CONSULTS
PSYCHIATRY CONSULT NOTE    REASON FOR CONSULT:Dementia, agitation      HISTORY OF PRESENTING COMPLAINT:  Lizandro Wheatley is a 79 y.o. WHITE/NON- male who is currently admitted to the medical floor at [address] [place] [address] Dr does not respond to assessment questions or verbal commands. Per staff, he has been agitated, pulling at things, swatting/hitting at staff, and spitting food out. He is not redirectable. He has received Ativan and Haldol, neither seem to be effective. Per record, he was diagnosed with dementia 4 years ago and has become increasingly combative at home. Wife is no longer able to take care of him. PAST PSYCHIATRIC HISTORY and SUBSTANCE ABUSE HISTORY:  Unable to assess      PAST MEDICAL HISTORY:    Please see H&P for details. Past Medical History:   Diagnosis Date    Skin cancer      Prior to Admission medications    Medication Sig Start Date End Date Taking? Authorizing Provider   memantine (Namenda) 10 mg tablet Take 10 mg by mouth two (2) times a day. Provider, Historical   risperiDONE (RisperDAL) 0.5 mg tablet Take 0.5 mg by mouth two (2) times a day. Provider, Historical   carvedilol (COREG) 12.5 mg tablet Take 6.25 mg by mouth two (2) times daily (with meals). 2/5/18   Provider, Historical   losartan (COZAAR) 25 mg tablet Take 25 mg by mouth daily. 2/6/18   Provider, Historical   lovastatin (MEVACOR) 20 mg tablet Take 20 mg by mouth daily.  2/5/18   Provider, Historical     Vitals:    06/25/22 0602 06/25/22 0800 06/25/22 1000 06/25/22 1130   BP: 137/71 132/79 (!) 136/52    Pulse: (!) 50 (!) 48 (!) 48 77   Resp: 19 11 13 14   Temp:  98 °F (36.7 °C)     SpO2: 100% 97% 98% 98%   Weight:         Lab Results   Component Value Date/Time    WBC 6.2 06/25/2022 06:09 AM    HGB 15.6 06/25/2022 06:09 AM    HCT 44.8 06/25/2022 06:09 AM    PLATELET 936 84/18/1404 06:09 AM    MCV 92.4 06/25/2022 06:09 AM     Lab Results   Component Value Date/Time    Sodium 141 06/25/2022 06:09 AM Potassium 3.9 06/25/2022 06:09 AM    Chloride 107 06/25/2022 06:09 AM    CO2 26 06/25/2022 06:09 AM    Anion gap 8 06/25/2022 06:09 AM    Glucose 75 06/25/2022 06:09 AM    BUN 8 06/25/2022 06:09 AM    Creatinine 0.70 06/25/2022 06:09 AM    BUN/Creatinine ratio 11 (L) 06/25/2022 06:09 AM    GFR est AA >60 06/25/2022 06:09 AM    GFR est non-AA >60 06/25/2022 06:09 AM    Calcium 8.7 06/25/2022 06:09 AM    Bilirubin, total 1.0 06/23/2022 10:44 PM    Alk. phosphatase 82 06/23/2022 10:44 PM    Protein, total 6.5 06/23/2022 10:44 PM    Albumin 3.5 06/23/2022 10:44 PM    Globulin 3.0 06/23/2022 10:44 PM    A-G Ratio 1.2 06/23/2022 10:44 PM    ALT (SGPT) 17 06/23/2022 10:44 PM    AST (SGOT) 21 06/23/2022 10:44 PM     No results found for: VALF2, VALAC, VALP, VALPR, DS6, CRBAM, CRBAMP, CARB2, XCRBAM  No results found for: LITHM  RADIOLOGY REPORTS:(reviewed/updated 6/25/2022)  XR CHEST PORT    Result Date: 6/23/2022  EXAM: XR CHEST PORT HISTORY: Chest pain. COMPARISON: 3/10/2021 FINDINGS: Single view(s) of the chest. The lungs are well inflated. No focal consolidation, pleural effusion, or pneumothorax. The heart is mildly enlarged, but unchanged. The visualized osseous structures are unremarkable. Unchanged mild cardiomegaly. No results found for: [address], [id number], [id number], [id number], PREGU, POCHCG, MHCGN, HCGQR, THCGA1, SHCG, HCGN, HCGSERUM, HCGURQLPOC    PSYCHOSOCIAL HISTORY:  Lives with wife. Not working      MENTAL STATUS EXAM:    General appearance: disheveled, psychomotor activity is agitated  Eye contact: Avoids eye contact  Speech: decreased  Affect : agitated  Mood: Unable to assess  Thought Process: Unable to assess  Perception: Unable to assess- per staff, he is responding to internal stimuli  Thought Content: Unable to assess  Insight: Poor  Judgement: Poor  Cognition: Diminished    ASSESSMENT AND PLAN:  Aram Alegria meets criteria for a diagnosis of Dementia.   He does not currently meet criteria for inpatient psychiatric services. He would likely benefit most from long term care setting. Recommendations:  Can increase haldol to 5mg IM Q6H  Continue to monitor EKG. Thank your your consult. Please feel free to consult us again as needed.

## 2022-06-25 NOTE — PROGRESS NOTES
Hospitalist Progress Note                               Eh Del Valle MD                                     Answering service: [phone]                               [phone] from in house phone                                         Date of Service:  2022  NAME:  Je Aldrich  :  1940  MRN:  [id number]      Admission Summary:   The patient is an 41-year-old gentleman, who has significant dementia. The patient lives with his wife at home. He also has history of hypertension and hyperlipidemia. The patient is currently on a beta blocker. He recently have become very agitated. Yesterday, he got more confused and had aggressive behavior. His wife called 911 as she could not control him. In the ambulance, the patient was given 5 mg of Versed and brought the patient here. In the emergency room, the patient has received 4 mg of Ativan, 10 mg of Haldol, and 50 mg of Benadryl and we are asked to admit him. At this time, the patient is pretty somnolent and he is unable to give me any history. His wife is on the bedside. She tells me that he rarely goes out of his house because of advanced dementia. He has no chest pain. In the emergency room, the patient was also found to be bradycardia; however, it seems like the patient is on beta-blocker. No new medications per his wife.       Reason for follow up:     Agitation and Bradycardia. Unable to get any history as he is sleeping per RN no acute events overnight. Assessment & Plan:     1. Patient admitted for agitation: Calm will change PRN Haldol to scheduled with PRN Ativan in between. He cant take Risperidone po due to agitation. He will need to be placed in memory care as wife cant take care of him. Psych consult is pending. UA un remarkable. 2. Bradycardia: Persists. Cardiology on consult. Beta blocker held . 2. History of hypertension.   Discontinue Coreg because of significant bradycardia. 3.  History of dementia. Continue Namenda. 4.  History of hyperlipidemia, on statin which will be continued. 5..  Deep venous thrombosis prophylaxis. Code status: DNR  DVT prophylaxis: Lovenox  Care Plan discussed with: RN  Patient has given Verbal permission to discuss medical care with   persons present in the room and and also with contact as listed on face sheet. Discharge planning/disposition:TBD    Hospital Problems  Date Reviewed: 4/3/2018          Codes Class Noted POA    Agitation ICD-10-CM: R45.1  ICD-9-CM: 307.9  6/24/2022 Unknown                Review of Systems:   Pertinent items are noted in HPI. Physical Examination:      Last 24hrs VS reviewed since prior progress note. Most recent are:  Visit Vitals  /71   Pulse (!) 50   Temp 98.6 °F (37 °C)   Resp 19   Wt 92 kg (202 lb 12.8 oz)   SpO2 100%   BMI 26.76 kg/m²           Constitutional:  No acute distress, cooperative, pleasant    HEENT: Head is a traumatic,  Un icteric sclera. Pink conjunctiva,no erythema or discharge. Oral mucous moist, oropharynx benign. Neck supple,    Resp:  CTA bilaterally. No wheezing/rhonchi/rales. No accessory muscle use   CV:  Regular rhythm, normal rate, no murmurs, gallops, rubs    GI:  Soft, non distended, non tender. normoactive bowel sounds, no hepatosplenomegaly    :  No CVA or suprapubic tenderness   Skin  :  No erythema,rash,bullae,dipigmentation     Musculoskeletal:  No edema, warm, 2+ pulses throughout    Neurologic:  AAOx3, CN II-XII reviewed. Moves all extremities.      Skin:  Good turgor, no rashes or ulcers         Intake/Output Summary (Last 24 hours) at 6/25/2022 7955  Last data filed at 6/25/2022 0648  Gross per 24 hour   Intake --   Output 800 ml   Net -800 ml          Data Review:    Review and/or order of clinical lab test      Labs:     Recent Labs     06/25/22  0609 06/23/22  2244   WBC 6.2 6.1   HGB 15.6 13.9   HCT 44.8 42.0    203     Recent Labs 06/25/22  0609 06/23/22  2244    138   K 3.9 4.0    105   CO2 26 24   BUN 8 11   CREA 0.70 0.70   GLU 75 97   CA 8.7 8.7   MG  --  2.0   PHOS  --  3.1     Recent Labs     06/23/22  2244   ALT 17   AP 82   TBILI 1.0   TP 6.5   ALB 3.5   GLOB 3.0     No results for input(s): INR, PTP, APTT, INREXT in the last 72 hours. No results for input(s): FE, TIBC, PSAT, FERR in the last 72 hours. No results found for: FOL, RBCF   No results for input(s): PH, PCO2, PO2 in the last 72 hours. No results for input(s): CPK, CKNDX, TROIQ in the last 72 hours.     No lab exists for component: CPKMB  No results found for: CHOL, CHOLX, CHLST, CHOLV, HDL, HDLP, LDL, LDLC, DLDLP, TGLX, TRIGL, TRIGP, CHHD, CHHDX  No results found for: [facility]  Lab Results   Component Value Date/Time    Color YELLOW/STRAW 06/23/2022 11:10 PM    Appearance CLEAR 06/23/2022 11:10 PM    Specific gravity 1.006 06/23/2022 11:10 PM    pH (UA) 5.5 06/23/2022 11:10 PM    Protein Negative 06/23/2022 11:10 PM    Glucose Negative 06/23/2022 11:10 PM    Ketone Negative 06/23/2022 11:10 PM    Bilirubin Negative 06/23/2022 11:10 PM    Urobilinogen 1.0 06/23/2022 11:10 PM    Nitrites Negative 06/23/2022 11:10 PM    Leukocyte Esterase Negative 06/23/2022 11:10 PM    Epithelial cells FEW 06/23/2022 11:10 PM    Bacteria Negative 06/23/2022 11:10 PM    WBC 0-4 06/23/2022 11:10 PM    RBC 0-5 06/23/2022 11:10 PM         Medications Reviewed:     Current Facility-Administered Medications   Medication Dose Route Frequency    atorvastatin (LIPITOR) tablet 10 mg  10 mg Oral QHS    memantine (NAMENDA) tablet 10 mg  10 mg Oral BID    risperiDONE (RisperDAL) tablet 0.5 mg  0.5 mg Oral BID    losartan (COZAAR) tablet 25 mg  25 mg Oral DAILY    0.9% sodium chloride infusion  50 mL/hr IntraVENous CONTINUOUS    LORazepam (ATIVAN) 2 mg/mL injection 1 mg  1 mg IntraVENous Q4H PRN    haloperidol lactate (HALDOL) injection 2 mg  2 mg IntraMUSCular Q4H PRN     Current Outpatient Medications   Medication Sig    memantine (Namenda) 10 mg tablet Take 10 mg by mouth two (2) times a day.  risperiDONE (RisperDAL) 0.5 mg tablet Take 0.5 mg by mouth two (2) times a day.  carvedilol (COREG) 12.5 mg tablet Take 6.25 mg by mouth two (2) times daily (with meals).  losartan (COZAAR) 25 mg tablet Take 25 mg by mouth daily.  lovastatin (MEVACOR) 20 mg tablet Take 20 mg by mouth daily.      ______________________________________________________________________  EXPECTED LENGTH OF STAY: - - -  ACTUAL LENGTH OF STAY:          [address] [facility] Center[facility]re[facility] Perry MD

## 2022-06-25 NOTE — PROGRESS NOTES
1530  TRANSFER - IN REPORT:    Verbal report received from Ilana Jesus RN (name) on Cristiano Cook  being received from ED (unit) for routine progression of care      Report consisted of patients Situation, Background, Assessment and   Recommendations(SBAR). Information from the following report(s) SBAR, Kardex, ED Summary and Recent Results was reviewed with the receiving nurse. Opportunity for questions and clarification was provided. Assessment completed upon patients arrival to unit and care assumed. 1615 - Pt arrived on floor     1900  Bedside and Verbal shift change report given to Gerardo Vazquez RN (oncoming nurse) by Cheyanne Solis RN (offgoing nurse). Report included the following information SBAR, Kardex, Procedure Summary, Intake/Output, MAR, Recent Results, and Med Rec Status.

## 2022-06-25 NOTE — ED NOTES
Bilateral hand mittens placed on patient for safety, as he continues to pull at IV's, lines, and equipment. Pt at risk for safety. Attempted to re-direct patient. Pt in sight of nurses station. 24/7 sitter at [facility]. And PRN Haldol and Ativan given to patient. Interventions have not worked. Discussed interventions and safety with Dr. Vik Baker. Per MD order, Bilateral hand mittens placed on patient. Airway is patent. Respirations are even and non-labored. sats are wnl on r/a. Skin intact at hands and wrists. Circulation intact BUE. Will con't to monitor.

## 2022-06-25 NOTE — ED NOTES
Sat pt up and attempted to feed. Will only respond to the command \"open your mouth\". Was able to give him 4 spoon fulls of water. Attempted to give pt food from dietary, spit out all solid foods towards me. Constantly attempts to swat at me and is trying to get out of bed.

## 2022-06-25 NOTE — ROUTINE PROCESS
TRANSFER - OUT REPORT:    Verbal report given to Edmundo(name) on Dianne Luther  being transferred to [facility](unit) for routine progression of care       Report consisted of patients Situation, Background, Assessment and   Recommendations(SBAR). Information from the following report(s) SBAR, ED Summary, [facility] and Recent Results was reviewed with the receiving nurse. Lines:   Peripheral IV 06/24/22 Right Antecubital (Active)   Site Assessment Clean, dry, & intact 06/25/22 1100   Phlebitis Assessment 0 06/25/22 1100   Infiltration Assessment 0 06/25/22 1100   Dressing Status Clean, dry, & intact 06/25/22 1100   Dressing Type Transparent 06/25/22 1100   Hub Color/Line Status Pink 06/25/22 1100        Opportunity for questions and clarification was provided.       Patient transported with:   Monitor

## 2022-06-25 NOTE — PROGRESS NOTES
6/25/22  12:24 PM    CM noted referral for assistance with placement. CM met with patient's spouse in the room to complete the initial evaluation. The patient's spouse has been managing the patient at home since his diagnosis of dementia approximately 4 years ago and recently he has become more agitated and more physical to the point where she can no longer care for him at home. Patients spouse has been researching memory units but has found that they are expensive. CM provided patients spouse with a list of facilities and with a contact number for [facility] who can assist with looking at their budget and looking at all of the options within their budget. CM noted psych and [facility]SMART have been consulted- awaiting recommendations, psych has not yet seen the patient. CM will need recommendations to assist with making placement plans. Care Management Initial Evaluation:    Reason for Admission:  Agitation    PMH of hypertension, dementia,  Hyperlipidemia and deep venous thrombosis prophylaxis. RUR Score: 9%                    Plan for utilizing home health:  No home health history        PCP: First and Last name:  Judah Hernandes MD- Dr. Mariana Brooks     Name of Practice:    Are you a current patient: Yes/No: Yes   Approximate date of last visit: within the last 3 months   Can you participate in a virtual visit with your PCP:                     Current Advanced Directive/Advance Care Plan: DNR      Healthcare Decision Maker:   Click here to complete [address] including selection of the Healthcare Decision Maker Relationship (ie \"Primary\")  Osiris Little: [phone]                           Transition of Care Plan:                    Patient resides with his spouse in a two level home with 5 or 6 entry steps. Patients room is on the first floor. He does not use DME but uses a walking stick. 1). Admitted for emergent care  2).  Cardiology consulted for Bradycardia  3). Psych and BSMART consulted- awaiting recommendations  4). Transport- TBD  5). CM following     [facility] [address] Management Interventions  PCP Verified by CM: Yes (within the last three months)  Mode of Transport at Discharge:  Other (see comment) (TBD)  Transition of Care Consult (CM Consult): Discharge Planning  Discharge Durable Medical Equipment: No  Physical Therapy Consult: No  Occupational Therapy Consult: No  Speech Therapy Consult: No  Support Systems: Spouse/Significant Other  Confirm Follow Up Transport: Family  Discharge Location  Patient Expects to be Discharged to[de-identified] Unable to determine at this time

## 2022-06-25 NOTE — ED NOTES
Received patient. Vital signs stable. Condom catheter in used draining lupillo urine. Patient has Non-violent Mitts in place. Documented in flow sheets. Mitts placed on patient last night at 2200 and order expires today 6/25 at 2359.

## 2022-06-25 NOTE — ED NOTES
Attempting to assess patient, patient became combative and winging fists at this RN.  PRN ativan administered for agitation

## 2022-06-26 NOTE — PROGRESS NOTES
Hospitalist Progress Note                               Dallin Garcia MD                                     Answering service: [phone]                               OR [phone] from in house phone                                         Date of Service:  2022  NAME:  Ron Vasquez  :  1940  MRN:  [id number]      Admission Summary:   The patient is an 79-year-old gentleman, who has significant dementia. The patient lives with his wife at home. He also has history of hypertension and hyperlipidemia. The patient is currently on a beta blocker. He recently have become very agitated. Yesterday, he got more confused and had aggressive behavior. His wife called 911 as she could not control him. In the ambulance, the patient was given 5 mg of Versed and brought the patient here. In the emergency room, the patient has received 4 mg of Ativan, 10 mg of Haldol, and 50 mg of Benadryl and we are asked to admit him. At this time, the patient is pretty somnolent and he is unable to give me any history. His wife is on the bedside. She tells me that he rarely goes out of his house because of advanced dementia. He has no chest pain. In the emergency room, the patient was also found to be bradycardia; however, it seems like the patient is on beta-blocker. No new medications per his wife.       Reason for follow up:     Agitation and Bradycardia. Unable to get any history as he is sleeping per RN no acute events overnight. Has steven on      Assessment & Plan:     1. Patient admitted for agitation: Calm will change PRN Haldol to scheduled   Now on 5mg Q6 hours  Yesterday received  22 mg . with PRN Ativan in between. He cant take Risperidone po due to agitation. He will need to be placed in memory care as wife cant take care of him. Psych consult is pending. UA un remarkable. 2. Bradycardia: Improved. Cardiology on consult. Beta blocker held . 2.   History of hypertension. Discontinue Coreg because of significant bradycardia. 3.  History of dementia. Continue Namenda. 4.  History of hyperlipidemia, on statin which will be continued. 5..  Deep venous thrombosis prophylaxis. Code status: DNR  DVT prophylaxis: Lovenox  Care Plan discussed with: RN  Patient has given Verbal permission to discuss medical care with   persons present in the room and and also with contact as listed on face sheet. Discharge planning/disposition:TBD    Hospital Problems  Date Reviewed: 4/3/2018          Codes Class Noted POA    Agitation ICD-10-CM: R45.1  ICD-9-CM: 307.9  6/24/2022 Unknown                Review of Systems:   Pertinent items are noted in HPI. Physical Examination:      Last 24hrs VS reviewed since prior progress note. Most recent are:  Visit Vitals  BP (!) 157/88 (BP 1 Location: Left upper arm, BP Patient Position: At rest;Supine)   Pulse 66   Temp (!) 96.3 °F (35.7 °C)   Resp 18   Wt 92 kg (202 lb 12.8 oz)   SpO2 92%   BMI 26.76 kg/m²           Constitutional:  No acute distress, cooperative, pleasant    HEENT: Head is a traumatic,  Un icteric sclera. Pink conjunctiva,no erythema or discharge. Oral mucous moist, oropharynx benign. Neck supple,    Resp:  CTA bilaterally. No wheezing/rhonchi/rales. No accessory muscle use   CV:  Regular rhythm, normal rate, no murmurs, gallops, rubs    GI:  Soft, non distended, non tender. normoactive bowel sounds, no hepatosplenomegaly    :  No CVA or suprapubic tenderness   Skin  :  No erythema,rash,bullae,dipigmentation     Musculoskeletal:  No edema, warm, 2+ pulses throughout    Neurologic:  AAOx3, CN II-XII reviewed. Moves all extremities.      Skin:  Good turgor, no rashes or ulcers         Intake/Output Summary (Last 24 hours) at 6/26/2022 1227  Last data filed at 6/26/2022 0313  Gross per 24 hour   Intake 240 ml   Output 1000 ml   Net -760 ml          Data Review:    Review and/or order of clinical lab test      Labs: Recent Labs     06/26/22  0622 06/25/22  0609   WBC 7.1 6.2   HGB 16.3 15.6   HCT 47.7 44.8    188     Recent Labs     06/26/22  0622 06/25/22  0609 06/23/22  2244    141 138   K 4.0 3.9 4.0    107 105   CO2 25 26 24   BUN 10 8 11   CREA 0.67* 0.70 0.70   GLU 85 75 97   CA 8.9 8.7 8.7   MG  --   --  2.0   PHOS  --   --  3.1     Recent Labs     06/23/22  2244   ALT 17   AP 82   TBILI 1.0   TP 6.5   ALB 3.5   GLOB 3.0     No results for input(s): INR, PTP, APTT, INREXT, INREXT in the last 72 hours. No results for input(s): FE, TIBC, PSAT, FERR in the last 72 hours. No results found for: FOL, RBCF   No results for input(s): PH, PCO2, PO2 in the last 72 hours. No results for input(s): CPK, CKNDX, TROIQ in the last 72 hours.     No lab exists for component: CPKMB  No results found for: CHOL, CHOLX, CHLST, CHOLV, HDL, HDLP, LDL, LDLC, DLDLP, TGLX, TRIGL, TRIGP, CHHD, CHHDX  No results found for: [facility]  Lab Results   Component Value Date/Time    Color YELLOW/STRAW 06/23/2022 11:10 PM    Appearance CLEAR 06/23/2022 11:10 PM    Specific gravity 1.006 06/23/2022 11:10 PM    pH (UA) 5.5 06/23/2022 11:10 PM    Protein Negative 06/23/2022 11:10 PM    Glucose Negative 06/23/2022 11:10 PM    Ketone Negative 06/23/2022 11:10 PM    Bilirubin Negative 06/23/2022 11:10 PM    Urobilinogen 1.0 06/23/2022 11:10 PM    Nitrites Negative 06/23/2022 11:10 PM    Leukocyte Esterase Negative 06/23/2022 11:10 PM    Epithelial cells FEW 06/23/2022 11:10 PM    Bacteria Negative 06/23/2022 11:10 PM    WBC 0-4 06/23/2022 11:10 PM    RBC 0-5 06/23/2022 11:10 PM         Medications Reviewed:     Current Facility-Administered Medications   Medication Dose Route Frequency    haloperidol lactate (HALDOL) injection 5 mg  5 mg IntraMUSCular Q6H    atorvastatin (LIPITOR) tablet 10 mg  10 mg Oral QHS    memantine (NAMENDA) tablet 10 mg  10 mg Oral BID    [Held by provider] risperiDONE (RisperDAL) tablet 0.5 mg  0.5 mg Oral BID    losartan (COZAAR) tablet 25 mg  25 mg Oral DAILY    0.9% sodium chloride infusion  50 mL/hr IntraVENous CONTINUOUS    LORazepam (ATIVAN) 2 mg/mL injection 1 mg  1 mg IntraVENous Q4H PRN    haloperidol lactate (HALDOL) injection 2 mg  2 mg IntraMUSCular Q4H PRN     ______________________________________________________________________  EXPECTED LENGTH OF STAY: - - -  ACTUAL LENGTH OF STAY:          2                 Christine Gonzalez MD

## 2022-06-26 NOTE — PROGRESS NOTES
Cardiology advised to decrease dose of Haldol as he is still Bradycardiac. Per wife his HR always runs slow. Decrease dose of Haldol  form 5 mg Q6 H to 5 mg Q8 Hours and monitor. I had a  detailed discussion about placement wife is thinking about memory care now.

## 2022-06-26 NOTE — PROGRESS NOTES
6/26/2022  12:10 PM  CM spoke with pt's wife, Kaitlyn Johnson, over the phone. Discussed discharge plan. Wife states that if medications can be adjusted and patient's behavior improves, she would be willing to take him home. If the behavior does not improve, she does not feel that she can care for him and agrees that placement will be needed. Wife agreed to [facility] placing consult for Medicaid screening. Pt will also need a UAI for potential LTC placement. At this time, CM only aware of [address] [place] [address] having a locked LTC unit for dementia. Wife prefers pt not to be so far from [facility] but understands options are limited. She would prefer [facility]. CM spoke with MD Anne Meyers over the phone. MD plans to monitor pt's reaction to medication changes to look for behavior improvement. If improved, could potentially go home with wife. In the meantime, CM will pursue LTC option as backup. CM sent referral to [facility] to screen for Medicaid.     Mary Jo Klein RN

## 2022-06-26 NOTE — ROUTINE PROCESS
1900  Bedside shift change report given to Marsha Vang (oncoming nurse) by Cheryle Haley, RN (offgoing nurse). Report included the following information SBAR, Kardex, ED Summary, Intake/Output, and Recent Results. 2100  Restraint order expires at 2200. Contacted Dr. Darla Koyanagi. [place] received. [address] into room and patient had ripped out IV. Will obtain new IV access. 2315  When giving patient his Lipitor he proceeded to split it back at RN. Notified provider. No new orders         0700  Bedside shift change report given to Cheryle Haley, RN (oncoming nurse) by Marsha Vang (offgoing nurse). Report included the following information SBAR, Kardex, ED Summary, Intake/Output, and Recent Results. This patient was assisted with Intentional Toileting every 2 hours during this shift as appropriate. Documentation of ambulation and output reflected on Flowsheet as appropriate. Purposeful hourly rounding was completed using AIDET and 5Ps. Outcomes of PHR documented as they occurred. Bed alarm in use as appropriate. Dual Suction and ambubag in place.

## 2022-06-26 NOTE — PROGRESS NOTES
0700  Bedside and Verbal shift change report given to Karishma Saba RN (oncoming nurse) by Edenilson Birmingham RN (offgoing nurse). Report included the following information SBAR, Kardex, Procedure Summary, Intake/Output, MAR, Recent Results, and Med Rec Status. 1545  Pt increasingly bradycardic, as low as 34. Paged cardiology. Dr. Nita Gonzalez returned page. Advised to reduce amount of haldol Pt is getting even if he gets agitated. 1900  Bedside and Verbal shift change report given to Juan Pablo Cade RN (oncoming nurse) by Karishma Saba RN (offgoing nurse). Report included the following information SBAR, Kardex, Procedure Summary, Intake/Output, MAR, Recent Results, and Med Rec Status.

## 2022-06-27 NOTE — PROGRESS NOTES
[facility] Adult  Hospitalist Group                                                                                          Hospitalist Progress Note  Jacky Romero MD          Date of Service:  2022  NAME:  Julianna Odonnell  :  1940  MRN:  [id number]      Admission Summary:   Admitted for acute agitation on top of chronic dementia, needs placement       Interval history / Subjective:   Continues to be agitated     Assessment & Plan:         #Agitation  Likely a consequence of worsening underlying dementia  -Have discontinued Haldol  -Have added Seroquel 25 mg nightly  -Q08, folic acid, TSH levels pending for dementia work-up  -Psychiatric consultation pending and appreciated    #QT prolongation  QTC was 502  -Risperidone has been discontinued  -Supplemental magnesium given IV  -Keep potassium greater than 4 and magnesium greater than 2  -Repeat EKG in a.m. #Bradycardia  Heart rate as low as 33. Continue to hold Coreg  -Rate is now in the [de-identified]  -Appreciate cardiology input    #Hypertension  Still high in the 170s over 80s  -Appreciate cardiology input       Code status:  DNR  Prophylaxis: Lovenox  Care Plan discussed with: Nursing  Anticipated Disposition: Memory care unit when stable     Hospital Problems  Date Reviewed: 4/3/2018          Codes Class Noted POA    Agitation ICD-10-CM: R45.1  ICD-9-CM: 307.9  2022 Unknown                Review of Systems:   Patient uncooperative with questioning      Vital Signs:    Last 24hrs VS reviewed since prior progress note.  Most recent are:  Visit Vitals  BP (!) 164/75 (BP 1 Location: Right upper arm, BP Patient Position: At rest)   Pulse 70   Temp 97.2 °F (36.2 °C)   Resp 16   Wt 92 kg (202 lb 12.8 oz)   SpO2 95%   BMI 26.76 kg/m²         Intake/Output Summary (Last 24 hours) at 2022 1801  Last data filed at 2022 1200  Gross per 24 hour   Intake 60 ml   Output 925 ml   Net -865 ml        Physical Examination:     I had a face to face encounter with this patient and independently examined them on 6/27/2022 as outlined below:          Constitutional:  No acute distress, minimally interactive   ENT:  Oral mucosa moist, oropharynx benign. Resp:  CTA bilaterally. No wheezing/rhonchi/rales. No accessory muscle use. CV:  Regular rhythm, normal rate, no murmurs, gallops, rubs    GI:  Soft, non distended, non tender. normoactive bowel sounds, no hepatosplenomegaly     Musculoskeletal:  No edema, warm, 2+ pulses throughout    Neurologic:  Moves all extremities. Does not follow commands, uncooperative            Data Review:    Review and/or order of clinical lab test      Labs:     Recent Labs     06/26/22 0622 06/25/22  0609   WBC 7.1 6.2   HGB 16.3 15.6   HCT 47.7 44.8    188     Recent Labs     06/26/22 0622 06/25/22  0609    141   K 4.0 3.9    107   CO2 25 26   BUN 10 8   CREA 0.67* 0.70   GLU 85 75   CA 8.9 8.7     No results for input(s): ALT, AP, TBIL, TBILI, TP, ALB, GLOB, GGT, AML, LPSE in the last 72 hours. No lab exists for component: SGOT, GPT, AMYP, HLPSE  No results for input(s): INR, PTP, APTT, INREXT in the last 72 hours. No results for input(s): FE, TIBC, PSAT, FERR in the last 72 hours. Lab Results   Component Value Date/Time    Folate 9.1 06/26/2022 06:22 AM      No results for input(s): PH, PCO2, PO2 in the last 72 hours. No results for input(s): CPK, CKNDX, TROIQ in the last 72 hours.     No lab exists for component: CPKMB  No results found for: CHOL, CHOLX, CHLST, CHOLV, HDL, HDLP, LDL, LDLC, DLDLP, TGLX, TRIGL, TRIGP, CHHD, CHHDX  No results found for: [facility]  Lab Results   Component Value Date/Time    Color YELLOW/STRAW 06/23/2022 11:10 PM    Appearance CLEAR 06/23/2022 11:10 PM    Specific gravity 1.006 06/23/2022 11:10 PM    pH (UA) 5.5 06/23/2022 11:10 PM    Protein Negative 06/23/2022 11:10 PM    Glucose Negative 06/23/2022 11:10 PM    Ketone Negative 06/23/2022 11:10 PM    Bilirubin Negative 06/23/2022 11:10 PM    Urobilinogen 1.0 06/23/2022 11:10 PM    Nitrites Negative 06/23/2022 11:10 PM    Leukocyte Esterase Negative 06/23/2022 11:10 PM    Epithelial cells FEW 06/23/2022 11:10 PM    Bacteria Negative 06/23/2022 11:10 PM    WBC 0-4 06/23/2022 11:10 PM    RBC 0-5 06/23/2022 11:10 PM         Medications Reviewed:     Current Facility-Administered Medications   Medication Dose Route Frequency    QUEtiapine (SEROquel) tablet 25 mg  25 mg Oral QHS    LORazepam (ATIVAN) 2 mg/mL injection 1 mg  1 mg IntraVENous Q4H PRN    enoxaparin (LOVENOX) injection 40 mg  40 mg SubCUTAneous Q24H    atorvastatin (LIPITOR) tablet 10 mg  10 mg Oral QHS    memantine (NAMENDA) tablet 10 mg  10 mg Oral BID    [Held by provider] risperiDONE (RisperDAL) tablet 0.5 mg  0.5 mg Oral BID    losartan (COZAAR) tablet 25 mg  25 mg Oral DAILY    0.9% sodium chloride infusion  75 mL/hr IntraVENous CONTINUOUS     ______________________________________________________________________  EXPECTED LENGTH OF STAY: - - -  ACTUAL LENGTH OF STAY:          3                 Addison Garcia MD

## 2022-06-27 NOTE — PROGRESS NOTES
1207:  Spoke with admissions in [facility]. They have a memory unit but it is not locked. Both [address] accept [employer]. Spoke with Cass Reinaldo in admissions who stated they have a memory unit only for assisted living which is private pay. P'ts wife was updated. She plans to visit [facility]. Transition of Care Plan: RUR-9%  1. Medicaid screening by Isabelle Hernandes pending  2. Pt will need [facility] for LTC   3. Met with pt's wife who would like pt to go to [facility] for LTC with dementia unit; will need auth  4. If pt's behavior improves with medication, wife would prefer to take him home  5. Cardiology following  6. CM following   SOLOMON Solorio

## 2022-06-27 NOTE — PROGRESS NOTES
0700: Bedside shift change report given to Nerissa Flores RN (oncoming nurse) by Ana Duncan RN (offgoing nurse). Report included the following information SBAR, Kardex, ED Summary, Procedure Summary, Intake/Output, MAR, Recent Results and Med Rec Status. 1218: Patient's wife stating patient is agitated. Patient is restless, no scheduled medication at this time. Spoke with Dr. Hershel Collet in regards to patient's [address] Want's to d/c the Halodol and start patient on Seroquel. Obtain baseline EKG prior to Seroquel administration. Will continue to monitor.

## 2022-06-27 NOTE — PROGRESS NOTES
0700    Bedside and Verbal shift change report given to Angelique Win RN (oncoming nurse) by Delmer Faulkner RN (offgoing nurse). Report included the following information SBAR, Kardex, ED Summary, Intake/Output, MAR, Recent Results and Cardiac Rhythm Afib. This patient was assisted with Intentional Toileting every 2 hours during this shift as appropriate. Documentation of ambulation and output reflected on Flowsheet as appropriate. Purposeful hourly rounding was completed using AIDET and 5Ps. Outcomes of PHR documented as they occurred. Bed alarm in use as appropriate. Dual Suction and ambubag in place.

## 2022-06-28 NOTE — PROGRESS NOTES
[facility] Adult  Hospitalist Group                                                                                          Hospitalist Progress Note  Sara Wyatt MD          Date of Service:  2022  NAME:  Gerardo Ram  :  1940  MRN:  [id number]      Admission Summary:   Admitted for acute agitation on top of chronic dementia, needs placement       Interval history / Subjective:   Patient had episode of unresponsiveness and was evaluated by code stroke which was negative     Assessment & Plan:         #Agitation/altered mental status  Likely a consequence of worsening underlying dementia  -Have discontinued Haldol  -Have added Seroquel 25 mg nightly  -L53, folic acid, TSH levels pending for dementia work-up  -Psychiatric consultation pending and appreciated    -patient had episode of unresponsiveness along with bradycardia and was evaluated for possible code stroke. CT of the head was negative. Appreciate neurology input, no evidence of acute stroke. Less likely just consequence of advanced dementia  RECOMMENDATIONS  1. Will do EEG  2. Will check ammonia  3. Advise bright light exposure during the day to address sun downing  4. Cardiology to address bradycardia  5. Already on Namenda 10 mg BID for dementia    #QT prolongation  QTC was 502  -Risperidone has been discontinued  -Supplemental magnesium given IV  -Keep potassium greater than 4 and magnesium greater than 2  -Repeat EKG in a.m. -resolved    #Bradycardia  Heart rate as low as 33. Continue to hold Coreg  -Rate is now in the [address]  cardiology input    -patient continues to be intermittently bradycardic with heart rate as low as the 30s.   Appreciate input from cardiology service   \"AFib:  Unknown duration.  Doubt any utility in initiating anticoagulation given severe dementia.  Tendency to bradycardia due to underlying conduction system disease (LBBB): stopped coreg.  Aggressive comfort care.  I do not see the utility in treating bradycardia in this very confused/demented DNR man. I would not pursue PPM.  If SOMETHING MUST be done, one could consider theophylline 300mg bid, though this may aggravate his agitation. My ultimate solution is to stop telemetry. \"       #Hypertension  Still high in the 170s over 80s  -Appreciate cardiology input       Code status:  DNR  Prophylaxis: Lovenox  Care Plan discussed with: Nursing  Anticipated Disposition: Memory care unit when stable     Hospital Problems  Date Reviewed: 4/3/2018          Codes Class Noted POA    Agitation ICD-10-CM: R45.1  ICD-9-CM: 307.9  6/24/2022 Unknown                Review of Systems:   Patient uncooperative with questioning      Vital Signs:    Last 24hrs VS reviewed since prior progress note. Most recent are:  Visit Vitals  BP (!) 146/89 (BP 1 Location: Right upper arm, BP Patient Position: At rest)   Pulse 94   Temp 98 °F (36.7 °C)   Resp 17   Wt 93.4 kg (206 lb)   SpO2 99%   BMI 27.18 kg/m²         Intake/Output Summary (Last 24 hours) at 6/28/2022 1912  Last data filed at 6/28/2022 0348  Gross per 24 hour   Intake --   Output 200 ml   Net -200 ml        Physical Examination:     I had a face to face encounter with this patient and independently examined them on 6/28/2022 as outlined below:          Constitutional:  No acute distress, minimally interactive   ENT:  Oral mucosa moist, oropharynx benign. Resp:  CTA bilaterally. No wheezing/rhonchi/rales. No accessory muscle use. CV:  Regular rhythm, normal rate, no murmurs, gallops, rubs    GI:  Soft, non distended, non tender. normoactive bowel sounds, no hepatosplenomegaly     Musculoskeletal:  No edema, warm, 2+ pulses throughout    Neurologic:  Moves all extremities.   Does not follow commands, uncooperative, responds to sternal rub            Data Review:    Review and/or order of clinical lab test      Labs:     Recent Labs     06/26/22  0622   WBC 7.1   HGB 16.3   HCT 47.7    Recent Labs     06/26/22 0622      K 4.0      CO2 25   BUN 10   CREA 0.67*   GLU 85   CA 8.9     No results for input(s): ALT, AP, TBIL, TBILI, TP, ALB, GLOB, GGT, AML, LPSE in the last 72 hours. No lab exists for component: SGOT, GPT, AMYP, HLPSE  No results for input(s): INR, PTP, APTT, INREXT, INREXT in the last 72 hours. No results for input(s): FE, TIBC, PSAT, FERR in the last 72 hours. Lab Results   Component Value Date/Time    Folate 9.1 06/26/2022 06:22 AM      No results for input(s): PH, PCO2, PO2 in the last 72 hours. No results for input(s): CPK, CKNDX, TROIQ in the last 72 hours.     No lab exists for component: CPKMB  No results found for: CHOL, CHOLX, CHLST, CHOLV, HDL, HDLP, LDL, LDLC, DLDLP, TGLX, TRIGL, TRIGP, CHHD, CHHDX  Lab Results   Component Value Date/Time    Glucose (POC) 79 06/28/2022 09:03 AM     Lab Results   Component Value Date/Time    Color YELLOW/STRAW 06/23/2022 11:10 PM    Appearance CLEAR 06/23/2022 11:10 PM    Specific gravity 1.006 06/23/2022 11:10 PM    pH (UA) 5.5 06/23/2022 11:10 PM    Protein Negative 06/23/2022 11:10 PM    Glucose Negative 06/23/2022 11:10 PM    Ketone Negative 06/23/2022 11:10 PM    Bilirubin Negative 06/23/2022 11:10 PM    Urobilinogen 1.0 06/23/2022 11:10 PM    Nitrites Negative 06/23/2022 11:10 PM    Leukocyte Esterase Negative 06/23/2022 11:10 PM    Epithelial cells FEW 06/23/2022 11:10 PM    Bacteria Negative 06/23/2022 11:10 PM    WBC 0-4 06/23/2022 11:10 PM    RBC 0-5 06/23/2022 11:10 PM         Medications Reviewed:     Current Facility-Administered Medications   Medication Dose Route Frequency    [START ON 6/29/2022] QUEtiapine (SEROquel) tablet 25 mg  25 mg Oral BID    QUEtiapine (SEROquel) tablet 25 mg  25 mg Oral QHS    LORazepam (ATIVAN) 2 mg/mL injection 1 mg  1 mg IntraVENous Q4H PRN    enoxaparin (LOVENOX) injection 40 mg  40 mg SubCUTAneous Q24H    atorvastatin (LIPITOR) tablet 10 mg  10 mg Oral QHS    memantine (NAMENDA) tablet 10 mg  10 mg Oral BID    [Held by provider] risperiDONE (RisperDAL) tablet 0.5 mg  0.5 mg Oral BID    losartan (COZAAR) tablet 25 mg  25 mg Oral DAILY    0.9% sodium chloride infusion  75 mL/hr IntraVENous CONTINUOUS     ______________________________________________________________________  EXPECTED LENGTH OF STAY: 4d 12h  ACTUAL LENGTH OF STAY:          4                 Marisel Reyes MD

## 2022-06-28 NOTE — PROGRESS NOTES
Arpan Villatoro MD, [address] [facility] [address] [place] [address],[address], [facility] [address] [address]  [phone]      IMPRESSION and RECOMMENDATIONS     1.  AFib:  Unknown duration. Doubt any utility in initiating anticoagulation given severe dementia. Tendency to bradycardia due to underlying conduction system disease (LBBB): stopped coreg. Aggressive comfort care. I do not see the utility in treating bradycardia in this very confused/demented DNR man. I would not pursue PPM.  If SOMETHING MUST be done, one could consider theophylline 300mg bid, though this may aggravate his agitation. My ultimate solution is to stop telemetry.     Will see prn. Usually sees Dr. Tammy Rush. Subjective:       Not communicative. Objective:   Patient Vitals for the past 16 hrs:   BP Temp Pulse Resp SpO2 Weight   06/28/22 1000 125/80 -- (!) 50 12 98 % --   06/28/22 0945 133/76 98.6 °F (37 °C) 67 18 98 % --   06/28/22 0900 132/84 -- (!) 58 17 99 % --   06/28/22 0729 (!) 156/89 98.1 °F (36.7 °C) 66 19 98 % --   06/28/22 0700 -- -- 76 -- -- --   06/28/22 0348 (!) 142/82 99.5 °F (37.5 °C) 63 20 98 % 93.4 kg (206 lb)   06/27/22 2232 131/68 98 °F (36.7 °C) (!) 57 14 96 % --   06/27/22 2127 (!) 154/81 97.8 °F (36.6 °C) 85 -- -- --       HEENT Exam:     WNL         Lung Exam:     The patient is not dyspneic. Breath sounds are heard equally in all lung fields. There are no wheezes, rales, rhonchi, or rubs heard on auscultation. Heart Exam:     The rhythm is irregularly irregular. The PMI is in the 5th intercostal space of the MCL. Apical impulse is normal. S1 is regular. S2 is physiologic. There is no S3, S4 gallop, murmur, click, or rub. Abdomen Exam:     Benign. Extremities Exam:     No cyanosis, clubbing, edema. Distal pulses intact.            Lab Results   Component Value Date/Time    Glucose 85 06/26/2022 06:22 AM    Sodium 141 06/26/2022 06:22 AM    Potassium 4.0 06/26/2022 06:22 AM    Chloride 108 06/26/2022 06:22 AM    CO2 25 06/26/2022 06:22 AM    BUN 10 06/26/2022 06:22 AM    Creatinine 0.67 (L) 06/26/2022 06:22 AM    Calcium 8.9 06/26/2022 06:22 AM     Recent Labs     06/26/22 0622   WBC 7.1   HGB 16.3   HCT 47.7        No results for input(s): ALT, AP, TBIL, TBILI, TP, ALB, GLOB, GGT in the last 72 hours. No lab exists for component: SGOT, GPT  No results for input(s): INR, PTP, APTT, INREXT in the last 72 hours. No results for input(s): CPK, CKMB, TNIPOC in the last 72 hours. No lab exists for component: TROPONINI, ITNL  No results for input(s): TROIQ in the last 72 hours.   No results found for: CHOL, CHOLX, CHLST, CHOLV, HDL, HDLP, LDL, LDLC, DLDLP, TGLX, TRIGL, TRIGP, CHHD, CHHDX

## 2022-06-28 NOTE — PROGRESS NOTES
Problem: Non-Violent Restraints  Goal: Removal from restraints as soon as assessed to be safe  Outcome: Progressing Towards Goal  Goal: No harm/injury to patient while restraints in use  Outcome: Progressing Towards Goal  Goal: Patient's dignity will be maintained  Outcome: Progressing Towards Goal  Goal: Patient Interventions  Outcome: Progressing Towards Goal     Problem: Pressure Injury - Risk of  Goal: *Prevention of pressure injury  Description: Document Blaise Scale and appropriate interventions in the flowsheet. Outcome: Progressing Towards Goal  Note: Pressure Injury Interventions:  Sensory Interventions: Keep linens dry and wrinkle-free,Minimize linen layers,Turn and reposition approx. every two hours (pillows and wedges if needed)    Moisture Interventions: Check for incontinence Q2 hours and as needed,Absorbent underpads,Apply protective barrier, creams and emollients,Minimize layers    Activity Interventions: Increase time out of bed,Pressure redistribution bed/mattress(bed type),PT/OT evaluation    Mobility Interventions: HOB 30 degrees or less,Pressure redistribution bed/mattress (bed type),PT/OT evaluation,Turn and reposition approx. every two hours(pillow and wedges)    Nutrition Interventions: Offer support with meals,snacks and hydration,Document food/fluid/supplement intake    Friction and Shear Interventions: Lift sheet,Minimize layers,HOB 30 degrees or less                Problem: Patient Education: Go to Patient Education Activity  Goal: Patient/Family Education  Outcome: Progressing Towards Goal     Problem: Falls - Risk of  Goal: *Absence of Falls  Description: Document Bobby Fall Risk and appropriate interventions in the flowsheet.   Outcome: Progressing Towards Goal  Note: Fall Risk Interventions:  Mobility Interventions: Bed/chair exit alarm,Communicate number of staff needed for ambulation/transfer,Patient to call before getting OOB    Mentation Interventions: Bed/chair exit alarm,Adequate sleep, hydration, pain control    Medication Interventions: Bed/chair exit alarm,Patient to call before getting OOB,Teach patient to arise slowly    Elimination Interventions: Bed/chair exit alarm,Call light in reach,Stay With Me (per policy)              Problem: Patient Education: Go to Patient Education Activity  Goal: Patient/Family Education  Outcome: Progressing Towards Goal

## 2022-06-28 NOTE — CONSULTS
NEUROLOGY IN-PATIENT NEW CONSULTATION      6/28/2022    RE: Chauncey Dodd         1940      REFERRED BY:  Jaylene Hayes MD      CHIEF COMPLAINT:  This is Chauncey Dodd is a 79 y.o. male who had concerns including Altered mental status and Aggressive behavior. HPI:     Patient admitted on 6/23/22, for agitation. Patient history of  advanceddementia lives with wife at home. On Namenda 10 mg BID and Risperidone 0.5 mg BID. Haldol and Ativan given. Patient awiating placement. Seroquel 25 mg added at night. Risperidone discontinued. Was agitated and was not sleeping well last night. At 0900 today, HR noted to be in the 40s. Patient not responding. BS 79. COde S called. CT head no acute process.       (+) Afib with bradycardia    ROS   (-) fever  (-) rash    All other systems reviewed and are negative    Past Medical Hx  Past Medical History:   Diagnosis Date    Skin cancer        Social Hx  Social History     Socioeconomic History    Marital status:    Tobacco Use    Smoking status: Former Smoker    Smokeless tobacco: Never Used       Family Hx  Family History   Problem Relation Age of Onset    Cancer Mother        ALLERGIES  No Known Allergies    CURRENT MEDS  Current Facility-Administered Medications   Medication Dose Route Frequency Provider Last Rate Last Admin    QUEtiapine (SEROquel) tablet 25 mg  25 mg Oral QHS Maximo Laird MD   25 mg at 06/27/22 2106    LORazepam (ATIVAN) 2 mg/mL injection 1 mg  1 mg IntraVENous Q4H PRN Maximo Laird MD   1 mg at 06/27/22 2135    enoxaparin (LOVENOX) injection 40 mg  40 mg SubCUTAneous Q24H Maximo Laird MD   40 mg at 06/27/22 1517    atorvastatin (LIPITOR) tablet 10 mg  10 mg Oral QHS Sonu Juarez MD   10 mg at 06/27/22 2105    memantine (NAMENDA) tablet 10 mg  10 mg Oral BID Sonu Juarez MD   10 mg at 06/27/22 1754    [Held by provider] risperiDONE (RisperDAL) tablet 0.5 mg  0.5 mg Oral BID Sonu Juarez MD        losartan (COZAAR) tablet 25 mg  25 mg Oral DAILY Lissette Peacock MD   25 mg at 06/27/22 1007    0.9% sodium chloride infusion  75 mL/hr IntraVENous CONTINUOUS Lissette Peacock MD 75 mL/hr at 06/27/22 2126 75 mL/hr at 06/27/22 2126           PREVIOUS WORKUP: (reviewed)  IMAGING:    CT Results (recent):  Results from [place] encounter on 06/23/22    CT CODE NEURO HEAD WO CONTRAST    Narrative  INDICATION: lethargy, difficult to arouse More than baseline no sedating meds in  over 12 hours    EXAM:  HEAD CT WITHOUT CONTRAST    COMPARISON: MRI October 24, 2006    TECHNIQUE:  Routine noncontrast axial head CT was performed. Sagittal and  coronal reconstructions were generated. CT dose reduction was achieved through use of a standardized protocol tailored  for this examination and automatic exposure control for dose modulation. FINDINGS:    Ventricles: Midline, no hydrocephalus. Intracranial Hemorrhage: None. Brain Parenchyma/Brainstem: Patchy chronic white matter hypodensity in the  supratentorial brain. Basal Cisterns: Normal.  Paranasal Sinuses: Visualized sinuses are clear. Additional Comments: N/A. Impression  No acute process. MRI Results (recent):  No results found for this or any previous visit. IR Results (recent):  No results found for this or any previous visit. VAS/US Results (recent):  No results found for this or any previous visit. LABS (reviewed)  Results for orders placed or performed during the hospital encounter of 06/23/22   CULTURE, URINE    Specimen: Clean catch; Urine   Result Value Ref Range    Special Requests: NO SPECIAL REQUESTS      Culture result: No growth (<1,000 CFU/ML)     CBC WITH AUTOMATED DIFF   Result Value Ref Range    WBC 6.1 4.1 - 11.1 K/uL    RBC 4.30 4. 10 - 5.70 M/uL    HGB 13.9 12.1 - 17.0 g/dL    HCT 42.0 36.6 - 50.3 %    MCV 97.7 80.0 - 99.0 FL    MCH 32.3 26.0 - 34.0 PG    MCHC 33.1 30.0 - 36.5 g/dL    RDW 12.4 11.5 - 14.5 %    PLATELET 635 911 - 231 K/uL MPV 9.4 8.9 - 12.9 FL    NRBC 0.0 0  WBC    ABSOLUTE NRBC 0.00 0.00 - 0.01 K/uL    NEUTROPHILS 62 32 - 75 %    LYMPHOCYTES 26 12 - 49 %    MONOCYTES 9 5 - 13 %    EOSINOPHILS 2 0 - 7 %    BASOPHILS 1 0 - 1 %    IMMATURE GRANULOCYTES 0 0.0 - 0.5 %    ABS. NEUTROPHILS 3.8 1.8 - 8.0 K/UL    ABS. LYMPHOCYTES 1.6 0.8 - 3.5 K/UL    ABS. MONOCYTES 0.6 0.0 - 1.0 K/UL    ABS. EOSINOPHILS 0.1 0.0 - 0.4 K/UL    ABS. BASOPHILS 0.1 0.0 - 0.1 K/UL    ABS. IMM. GRANS. 0.0 0.00 - 0.04 K/UL    DF AUTOMATED     METABOLIC PANEL, COMPREHENSIVE   Result Value Ref Range    Sodium 138 136 - 145 mmol/L    Potassium 4.0 3.5 - 5.1 mmol/L    Chloride 105 97 - 108 mmol/L    CO2 24 21 - 32 mmol/L    Anion gap 9 5 - 15 mmol/L    Glucose 97 65 - 100 mg/dL    BUN 11 6 - 20 MG/DL    Creatinine 0.70 0.70 - 1.30 MG/DL    BUN/Creatinine ratio 16 12 - 20      GFR est AA >60 >60 ml/min/1.73m2    GFR est non-AA >60 >60 ml/min/1.73m2    Calcium 8.7 8.5 - 10.1 MG/DL    Bilirubin, total 1.0 0.2 - 1.0 MG/DL    ALT (SGPT) 17 12 - 78 U/L    AST (SGOT) 21 15 - 37 U/L    Alk.  phosphatase 82 45 - 117 U/L    Protein, total 6.5 6.4 - 8.2 g/dL    Albumin 3.5 3.5 - 5.0 g/dL    Globulin 3.0 2.0 - 4.0 g/dL    A-G Ratio 1.2 1.1 - 2.2     ETHYL ALCOHOL   Result Value Ref Range    ALCOHOL(ETHYL),SERUM <10 <10 MG/DL   MAGNESIUM   Result Value Ref Range    Magnesium 2.0 1.6 - 2.4 mg/dL   PHOSPHORUS   Result Value Ref Range    Phosphorus 3.1 2.6 - 4.7 MG/DL   DRUG SCREEN, URINE   Result Value Ref Range    AMPHETAMINES Negative NEG      BARBITURATES Negative NEG      BENZODIAZEPINES Negative NEG      COCAINE Negative NEG      METHADONE Negative NEG      OPIATES Negative NEG      PCP(PHENCYCLIDINE) Negative NEG      THC (TH-CANNABINOL) Negative NEG      Drug screen comment (NOTE)    TROPONIN-HIGH SENSITIVITY   Result Value Ref Range    Troponin-High Sensitivity 11 0 - 76 ng/L   URINALYSIS W/ REFLEX CULTURE    Specimen: Urine   Result Value Ref Range    Color YELLOW/STRAW      Appearance CLEAR CLEAR      Specific gravity 1.006 1.003 - 1.030      pH (UA) 5.5 5.0 - 8.0      Protein Negative NEG mg/dL    Glucose Negative NEG mg/dL    Ketone Negative NEG mg/dL    Bilirubin Negative NEG      Blood Negative NEG      Urobilinogen 1.0 0.2 - 1.0 EU/dL    Nitrites Negative NEG      Leukocyte Esterase Negative NEG      UA:UC IF INDICATED CULTURE NOT INDICATED BY UA RESULT CNI      WBC 0-4 0 - 4 /hpf    RBC 0-5 0 - 5 /hpf    Epithelial cells FEW FEW /lpf    Bacteria Negative NEG /hpf    Hyaline cast 0-2 0 - 2 /lpf   TSH 3RD GENERATION   Result Value Ref Range    TSH 2.82 0.36 - 1.26 uIU/mL   METABOLIC PANEL, BASIC   Result Value Ref Range    Sodium 141 136 - 145 mmol/L    Potassium 3.9 3.5 - 5.1 mmol/L    Chloride 107 97 - 108 mmol/L    CO2 26 21 - 32 mmol/L    Anion gap 8 5 - 15 mmol/L    Glucose 75 65 - 100 mg/dL    BUN 8 6 - 20 MG/DL    Creatinine 0.70 0.70 - 1.30 MG/DL    BUN/Creatinine ratio 11 (L) 12 - 20      GFR est AA >60 >60 ml/min/1.73m2    GFR est non-AA >60 >60 ml/min/1.73m2    Calcium 8.7 8.5 - 10.1 MG/DL   CBC W/O DIFF   Result Value Ref Range    WBC 6.2 4.1 - 11.1 K/uL    RBC 4.85 4.10 - 5.70 M/uL    HGB 15.6 12.1 - 17.0 g/dL    HCT 44.8 36.6 - 50.3 %    MCV 92.4 80.0 - 99.0 FL    MCH 32.2 26.0 - 34.0 PG    MCHC 34.8 30.0 - 36.5 g/dL    RDW 12.3 11.5 - 14.5 %    PLATELET 084 519 - 027 K/uL    MPV 9.1 8.9 - 12.9 FL    NRBC 0.0 0  WBC    ABSOLUTE NRBC 0.00 0.00 - 0.30 K/uL   METABOLIC PANEL, BASIC   Result Value Ref Range    Sodium 141 136 - 145 mmol/L    Potassium 4.0 3.5 - 5.1 mmol/L    Chloride 108 97 - 108 mmol/L    CO2 25 21 - 32 mmol/L    Anion gap 8 5 - 15 mmol/L    Glucose 85 65 - 100 mg/dL    BUN 10 6 - 20 MG/DL    Creatinine 0.67 (L) 0.70 - 1.30 MG/DL    BUN/Creatinine ratio 15 12 - 20      GFR est AA >60 >60 ml/min/1.73m2    GFR est non-AA >60 >60 ml/min/1.73m2    Calcium 8.9 8.5 - 10.1 MG/DL   CBC W/O DIFF   Result Value Ref Range    WBC 7.1 4.1 - 11.1 K/uL    RBC 5.17 4.10 - 5.70 M/uL    HGB 16.3 12.1 - 17.0 g/dL    HCT 47.7 36.6 - 50.3 %    MCV 92.3 80.0 - 99.0 FL    MCH 31.5 26.0 - 34.0 PG    MCHC 34.2 30.0 - 36.5 g/dL    RDW 12.3 11.5 - 14.5 %    PLATELET 240 879 - 476 K/uL    MPV 9.3 8.9 - 12.9 FL    NRBC 0.0 0  WBC    ABSOLUTE NRBC 0.00 0.00 - 0.01 K/uL   VITAMIN B12   Result Value Ref Range    Vitamin B12 343 193 - 986 pg/mL   FOLATE   Result Value Ref Range    Folate 9.1 5.0 - 21.0 ng/mL   TSH 3RD GENERATION   Result Value Ref Range    TSH 2.52 0.36 - 3.74 uIU/mL   GLUCOSE, POC   Result Value Ref Range    Glucose (POC) 79 65 - 117 mg/dL    Performed by LUIS PÉREZ    EKG, 12 LEAD, INITIAL   Result Value Ref Range    Ventricular Rate 64 BPM    Atrial Rate 84 BPM    QRS Duration 126 ms    Q-T Interval 434 ms    QTC Calculation (Bezet) 447 ms    Calculated R Axis -22 degrees    Calculated T Axis 168 degrees    Diagnosis       Atrial fibrillation  Left bundle branch block  Abnormal ECG  When compared with ECG of 10-MAR-2021 03:59,  QT has lengthened  Confirmed by Kimmy Landis M.D.,  [place] ([id number]) on 6/24/2022 8:49:40 AM     EKG, 12 LEAD, INITIAL   Result Value Ref Range    Ventricular Rate 41 BPM    Atrial Rate 39 BPM    QRS Duration 134 ms    Q-T Interval 486 ms    QTC Calculation (Bezet) 400 ms    Calculated R Axis -8 degrees    Calculated T Axis 175 degrees    Diagnosis       Atrial fibrillation with slow ventricular response  Left bundle branch block  Abnormal ECG  Confirmed by Kimmy Landis M.D.,  [place] ([id number]) on 6/24/2022 4:07:16 PM     EKG, 12 LEAD, INITIAL   Result Value Ref Range    Ventricular Rate 80 BPM    Atrial Rate 500 BPM    QRS Duration 130 ms    Q-T Interval 436 ms    QTC Calculation (Bezet) 502 ms    Calculated R Axis -36 degrees    Calculated T Axis 144 degrees    Diagnosis       Atrial fibrillation  Left axis deviation  Left bundle branch block  Abnormal ECG  When compared with ECG of 24-JUN-2022 08:06,  Vent.  rate has increased BY  39 BPM  QT has lengthened     EKG, 12 LEAD, INITIAL   Result Value Ref Range    Ventricular Rate 94 BPM    Atrial Rate 340 BPM    QRS Duration 122 ms    Q-T Interval 378 ms    QTC Calculation (Bezet) 472 ms    Calculated R Axis -22 degrees    Calculated T Axis 176 degrees    Diagnosis       Atrial fibrillation  Left bundle branch block  Abnormal ECG  When compared with ECG of 27-JUN-2022 12:36,  MANUAL COMPARISON REQUIRED, DATA IS UNCONFIRMED     EKG, 12 LEAD, INITIAL   Result Value Ref Range    Ventricular Rate 66 BPM    Atrial Rate 250 BPM    QRS Duration 126 ms    Q-T Interval 426 ms    QTC Calculation (Bezet) 446 ms    Calculated R Axis -8 degrees    Calculated T Axis -146 degrees    Diagnosis       Atrial fibrillation  Left bundle branch block  Abnormal ECG  When compared with ECG of 27-JUN-2022 17:58,  MANUAL COMPARISON REQUIRED, DATA IS UNCONFIRMED         Physical Exam:     Visit Vitals  /80   Pulse (!) 50   Temp 98.6 °F (37 °C)   Resp 12   Wt 93.4 kg (206 lb)   SpO2 98%   BMI 27.18 kg/m²     General:  Alert, cooperative, no distress. Head:  Normocephalic, without obvious abnormality, atraumatic. Eyes:  Conjunctivae/corneas clear. Lungs:  Heart:   Non labored breathing  Regular rate and rhythm, no carotid bruits   Abdomen:   Soft, non-distended   Extremities: Extremities normal, atraumatic, no cyanosis or edema. Pulses: 2+ and symmetric all extremities. Skin: Skin color, texture, turgor normal. No rashes or lesions.   Neurologic Exam     Gen:Lethargic  Does not open eyes to pain  Withdraws in all extremities  Cranial Nerves:  I: smell Not tested   II: visual fields Full to confrontation   II: pupils Equal, round, reactive to light   II: optic disc No papilledema   III,VII: ptosis none   III,IV,VI: extraocular muscles  Full ROM   V: mastication normal   V: facial light touch sensation  normal   VII: facial muscle function   symmetric   VIII: hearing symmetric   IX: soft palate elevation  normal XI: trapezius strength  5/5   XI: sternocleidomastoid strength 5/5   XI: neck flexion strength  5/5   XII: tongue  midline     Motor: Withdraws in all extremities  Reflexes: 1+ throughout; Down going toes  Coordination: Good FTN and HTS  Gait: deferred           Impression:     Kindra Lisa is a 79 y.o. male who  has a past medical history of Skin cancer. He has no past medical history of Arsenic suspected exposure, Family history of skin cancer, Pacemaker, Radiation exposure, Sun-damaged skin, Sunburn, blistering, or Tanning bed exposure. who   Patient admitted on 6/23/22, for agitation. Patient history of  advanceddementia lives with wife at home. On Namenda 10 mg BID and Risperidone 0.5 mg BID. Haldol and Ativan given. Patient awiating placement. Seroquel 25 mg added at night. Risperidone discontinued. Was agitated and was not sleeping well last night. At 0900 today, HR noted to be in the 40s. Patient not responding. BS 79. COde S called. CT head no acute process. Consideration includes acute encephalopathy due to sundowning (did not sleep last night) in a patient with baseline advanced dementia. Also had periods of bradycardia. RECOMMENDATIONS  1. Will do EEG  2. Will check ammonia  3. Advise bright light exposure during the day to address sun downing  4. Cardiology to address bradycardia  5.  Already on Namenda 10 mg BID for dementia    Please call for questions        Thank you for the consultation      Mik Garsia MD  Diplomate, American Board of Psychiatry and Neurology  Diplomate, Neuromuscular Medicine  Diplomate, American Board of Electrodiagnostic Medicine    Greater than 50% of time spent counseling patient        CC: Marcelina Kang MD  Fax: [phone]

## 2022-06-28 NOTE — PROGRESS NOTES
Responded to code stroke- see stroke triage note. Van negative, CT obtained. Dr Menjivar Stager at bedside to assess [id number] in CT. Patient transported back to [facility] . No tele-neuro assessment at this time. Will consult neurology for lethargy and cardiology for bradycardia per Dr Menjivar Stager. CT negative for bleed. Typical post code stroke protocol. Failed assessment to perform STAND. NPO. NIH performed but difficult to obtain as patient is nonverbal baseline and does not follow commands or participate in exam. Patient does move all extremities equally and spontaneously. Ok to receive lovenox. Per Vesna Stokes NP OK to continue lovenox. SBP goal less than 180.

## 2022-06-28 NOTE — ADT AUTH CERT NOTES
6/28 partial review per ELIER request by Rochelle Yepez       Review Status Review Entered   In Primary 6/28/2022 12:15      Criteria Review   6/28    97.6 °F (36.4 °C) 51 Abnormal  129/55 Abnormal  80 -- At rest 17 98 %      EKG: Atrial fibrillation   Left bundle branch block   Abnormal ECG     RN progress note:  Signs and symptoms: unresponsive to sternal rub   Code Stroke activation CBPZ: 0687  Provider at bedside time:  N/a perfect serve message Dr Lawyer Rahman at 1042 to meet in CT. Arrived 0919 in CT given SBAR and Carolina Lima status, Geovanna Hardy at bedside at [place] [facility] (Time): 0715  Blood Glucose Result/Time: 79 0905   Blood Pressure: 132/84  Anticoagulants (List medications): see MAR none at this time just lovenox DVT prophylaxis     Neuro consult:  Impression:      Nadia Lloyd is a 79 y. o. male who  has a past medical history of Skin cancer. He has no past medical history of Arsenic suspected exposure, Family history of skin cancer, Pacemaker, Radiation exposure, Sun-damaged skin, Sunburn, blistering, or Tanning bed exposure. who   Patient admitted on 6/23/22, for agitation. Patient history of  advanceddementia lives with wife at home. On Namenda 10 mg BID and Risperidone 0.5 mg BID. Haldol and Ativan given. Patient awiating placement. Seroquel 25 mg added at night. Risperidone discontinued. Was agitated and was not sleeping well last night.     At 0900 today, HR noted to be in the 40s. Patient not responding. BS 79. COde S called. CT head no acute process.     Consideration includes acute encephalopathy due to sundowning (did not sleep last night) in a patient with baseline advanced dementia.  Also had periods of bradycardia.        RECOMMENDATIONS  1. Will do EEG  2. Will check ammonia  3. Advise bright light exposure during the day to address sun downing  4. Cardiology to address bradycardia  5. Already on Namenda 10 mg BID for dementia        Cardiology consult:  IMPRESSION and RECOMMENDATIONS     1.  AFib:  Unknown duration.  Doubt any utility in initiating anticoagulation given severe dementia.  Tendency to bradycardia due to underlying conduction system disease (LBBB): stopped coreg.  Aggressive comfort care.  I do not see the utility in treating bradycardia in this very confused/demented DNR man. Sita Stratton would not pursue PPM.  If SOMETHING MUST be done, one could consider theophylline 300mg bid, though this may aggravate his agitation.     My ultimate solution is to stop telemetry.        6/27 note review per ELIER request by Rich Yale       Review Status Review Entered   In Primary 6/28/2022 12:09      Criteria Review   6/27     Vital Signs:    Last 24hrs VS reviewed since prior progress note. Most recent are:  Visit Vitals  BP (!) 164/75 (BP 1 Location: Right upper arm, BP Patient Position: At rest)   Pulse 70   Temp 97.2 °F (36.2 °C)   Resp 16   Wt 92 kg (202 lb 12.8 oz)   SpO2 95%   BMI 26.76 kg/m²      Constitutional:   No acute distress, minimally interactive  ENT:   Oral mucosa moist, oropharynx benign. Resp:   CTA bilaterally. No wheezing/rhonchi/rales. No accessory muscle use. CV:   Regular rhythm, normal rate, no murmurs, gallops, rubs   GI:   Soft, non distended, non tender.  normoactive bowel sounds, no hepatosplenomegaly    Musculoskeletal:   No edema, warm, 2+ pulses throughout   Neurologic:   Moves all extremities.  Does not follow commands, uncooperative     IM note:  Interval history / Subjective:   Continues to be agitated      Assessment & Plan:          #Agitation  Likely a consequence of worsening underlying dementia  -Have discontinued Haldol  -Have added Seroquel 25 mg nightly  -S25, folic acid, TSH levels pending for dementia work-up  -Psychiatric consultation pending and appreciated     #QT prolongation  QTC was 502  -Risperidone has been discontinued  -Supplemental magnesium given IV  -Keep potassium greater than 4 and magnesium greater than 2  -Repeat EKG in a.m.     #Bradycardia  Heart rate as low as 33.  Continue to hold Coreg  -Rate is now in the [de-identified]  -Appreciate cardiology input     #Hypertension  Still high in the 170s over 80s  -Appreciate cardiology input        Code status:  DNR  Prophylaxis: Lovenox  Care Plan discussed with: Nursing  Anticipated Disposition: Memory care unit when stable      Medications Reviewed:                  Current Facility-Administered Medications   Medication Dose Route Frequency    QUEtiapine (SEROquel) tablet 25 mg  25 mg Oral QHS    LORazepam (ATIVAN) 2 mg/mL injection 1 mg  1 mg IntraVENous Q4H PRN    enoxaparin (LOVENOX) injection 40 mg  40 mg SubCUTAneous Q24H    atorvastatin (LIPITOR) tablet 10 mg  10 mg Oral QHS    memantine (NAMENDA) tablet 10 mg  10 mg Oral BID    [Held by provider] risperiDONE (RisperDAL) tablet 0.5 mg  0.5 mg Oral BID    losartan (COZAAR) tablet 25 mg  25 mg Oral DAILY    0.9% sodium chloride infusion  75 mL/hr IntraVENous CONTINUOUS

## 2022-06-28 NOTE — PROGRESS NOTES
0700: Bedside shift change report given to Stann Schilder, RN (oncoming nurse) by Kelsey Rubin RN (offgoing nurse). Report included the following information SBAR, Kardex, ED Summary, Procedure Summary, Intake/Output, MAR, Recent Results, Med Rec Status and Cardiac Rhythm A Fib.

## 2022-06-28 NOTE — PROGRESS NOTES
0940: Patient back in IP room, placed on monitor x3. Q1 Neuro/VS to be completed by RRT RN. SP under 160.  /76

## 2022-06-28 NOTE — PROGRESS NOTES
Signs and symptoms: unresponsive to sternal rub   Code Stroke activation time: 0807  Provider at bedside time:  N/a perfect serve message Dr Ponce Zapata at [phone] to meet in CT.  Arrived 0919 in CT given SBAR and [place] status, Wilber Pacer at bedside at 1008  VAN score: Negative  Last Known Well (Time): 0715  Blood Glucose Result/Time: 79 0905   Blood Pressure: 132/84  Anticoagulants (List medications): see MAR none at this time just lovenox DVT prophylaxis

## 2022-06-28 NOTE — PROGRESS NOTES
0700    Bedside and Verbal shift change report given to Angela Diamond RN (oncoming nurse) by Irma Chapin RN (offgoing nurse). Report included the following information SBAR, Kardex, ED Summary, Intake/Output, MAR, Recent Results and Cardiac Rhythm Afib. This patient was assisted with Intentional Toileting every 2 hours during this shift as appropriate. Documentation of ambulation and output reflected on Flowsheet as appropriate. Purposeful hourly rounding was completed using AIDET and 5Ps. Outcomes of PHR documented as they occurred. Bed alarm in use as appropriate. Dual Suction and ambubag in place.

## 2022-06-28 NOTE — PROGRESS NOTES
0900: Checked on patient due to HR dropping to 40's, Primary RN currently off the floor. Upon assessment, patient noted to not be responding to verbal stimuli or sternal rub. Code Stroke called on patient. Blood sugar 79. Patient to CT with RRT RN.

## 2022-06-28 NOTE — PROGRESS NOTES
Spiritual Care Assessment/Progress Note  [address]      NAME: Casper Jain      MRN: [id number]  AGE: 79 y.o. SEX: male  Jewish Affiliation: Confucianist   Language: English     6/28/2022     Total Time (in minutes): 7     Spiritual Assessment begun in [facility] 3 PRO CARE TELE 1 through conversation with:         []Patient        [] Family    [] Friend(s)        Reason for Consult: Initial/Spiritual assessment, patient floor     Spiritual beliefs: (Please include comment if needed)     [] Identifies with a radhika tradition:         [] Supported by a radhika community:            [] Claims no spiritual orientation:           [] Seeking spiritual identity:                [] Adheres to an individual form of spirituality:           [x] Not able to assess:                           Identified resources for coping:      [] Prayer                               [] Music                  [] Guided Imagery     [] Family/friends                 [] Pet visits     [] Devotional reading                         [x] Unknown     [] Other:                                               Interventions offered during this visit: (See comments for more details)    Patient Interventions: Initial visit           Plan of Care:     [] Support spiritual and/or cultural needs    [] Support AMD and/or advance care planning process      [] Support grieving process   [] Coordinate Rites and/or Rituals    [] Coordination with community clergy   [] No spiritual needs identified at this time   [] Detailed Plan of Care below (See Comments)  [] Make referral to Music Therapy  [] Make referral to Pet Therapy     [] Make referral to Addiction services  [] Make referral to [facility]  [] Make referral to Spiritual Care Partner  [] No future visits requested        [x] Contact Spiritual Care for further referrals     Attempted to visit pt for initial spiritual assessment.  Unable to complete assessment at this time as she was sleeping and did not awake. No family present. Pt's chart was consulted.   Chaplain Cheek MDiv, MS, [phone]

## 2022-06-28 NOTE — PROGRESS NOTES
Care Management follow up    Patient admitted for altered mental status, agitation  History of: HTN, advance dementia, hyperlipidemia    RUR 9 (Score %) low   Is This a Readmission NO  Is this a Bundle NO    Current status  Patient discussed during interdisciplinary rounds. Patient continues to require medical management inclduing ongoing assessment and monitoring. Code S this am, await EEG. Cardiology and neurology following. Patient's wife is evaluating facilities for memory care/LTC placement. Transition of Care Plan  1. Monitor patient status and response to treatment. 2. Patient continues to require medical management. 3. Wife evaluating LTC. 4. Patient will need [facility] for LTC. 5. CM to monitor progress and recommendations.     Patricia Irene, RN, MSN/Care manager

## 2022-06-28 NOTE — PROGRESS NOTES
0700: Bedside shift change report given to Robina Salcido RN (oncoming nurse) by Cira Gallardo RN (offgoing nurse). Report included the following information SBAR, Kardex, ED Summary, Procedure Summary, Intake/Output, MAR, Recent Results and Med Rec Status. 0700: Discontinued restraints. 0740: EKG obtained per order. Patient trying to pull at EKG leads. 0902: RRT called on patient due to patient's HR dipping into the 30's. (38) Not sustaining, patient responding minimally to sternal rub.    0903: Code S called on patient. Primary RN and ICU RN transporting patient down to CT. Dr. Kevin Gamboa at bedside to assess patient in [facility]: Patient back to room 320. CT negative for bleed. 1030: Notified Dr. Kevin Gamboa about patient's HR dipping into the 30's. (38) Patient does not sustain. No new orders received at this time, will continue to monitor. 1115: Received call from telemetry about patient's HR being in the 30's. Dr. Kevin Gamboa notified. No new orders received at this time, will continue to monitor. 1414: Patient trying to get out of bed and rip out IV. Patient is agitated and confused. PRN Ativan administered. Dr. Kevin Gamboa notified. 1630: Verbal order received from Dr. Kevin Gamboa to give a one time dose of 25mg PO Seroquel now. Still administer the PO scheduled evening dose. Order Seroquel 25mg PO BID.    1713: Administered Seroquel STAT order. Patient swallowed pills fine with applesauce. 1900: Bedside shift change report given to Purnima Stern RN (oncoming nurse) by Robina Salcido RN (offgoing nurse). Report included the following information SBAR, Kardex, ED Summary, Procedure Summary, Intake/Output, MAR, Recent Results and Med Rec Status.

## 2022-06-29 NOTE — PROGRESS NOTES
Call received from Dee Red, admission liaison at [address]. Family/wife is considering facility for long term care placement. Primo Cotto can be reached at [phone].     Julien Rees, RN, MSN/Care manager  [phone]

## 2022-06-29 NOTE — HOSPICE
[facility]  [phone]     Patient Name: Lizandro Wheatley  YOB: 1940  Age: 79 y.o. 190 Sue Negrete RN Note:  Hospice consult received, reviewing chart. Spoke with wife, Nicolas Sunshine [phone] discussed hospice philosophy, services and criteria for inpatient hospice (unmanageable end of life symptoms requiring IV intervention and inability to tolerate oral/sublingual dosing). This was a lot of information for Nicolas Sunshine to handle and she became tearful, anxious and upset as she feels the medical team has not discussed prognosis. Nicolas Sunshine will be at hospital tomorrow by 11am to continue discussions with medical team and hospice. Thank you for the opportunity to be of service to this patient.

## 2022-06-29 NOTE — PROGRESS NOTES
Order received for Hospice, possible GIP. Referral sent to [address] [place] [address] via [facility].     Abrahan Gonzalez RN, MSN/Care manager  [phone]

## 2022-06-29 NOTE — PROGRESS NOTES
Problem: Pressure Injury - Risk of  Goal: *Prevention of pressure injury  Description: Document Blaise Scale and appropriate interventions in the flowsheet. Outcome: Progressing Towards Goal  Note: Pressure Injury Interventions:  Sensory Interventions: Assess changes in LOC,Assess need for specialty bed,Keep linens dry and wrinkle-free,Minimize linen layers    Moisture Interventions: Absorbent underpads,Apply protective barrier, creams and emollients,Check for incontinence Q2 hours and as needed    Activity Interventions: Increase time out of bed,PT/OT evaluation    Mobility Interventions: Assess need for specialty bed,PT/OT evaluation    Nutrition Interventions: Document food/fluid/supplement intake,Offer support with meals,snacks and hydration    Friction and Shear Interventions: Apply protective barrier, creams and emollients,Minimize layers                Problem: Patient Education: Go to Patient Education Activity  Goal: Patient/Family Education  Outcome: Progressing Towards Goal     Problem: Falls - Risk of  Goal: *Absence of Falls  Description: Document Bobby Fall Risk and appropriate interventions in the flowsheet.   Outcome: Progressing Towards Goal  Note: Fall Risk Interventions:  Mobility Interventions: Patient to call before getting OOB,Bed/chair exit alarm    Mentation Interventions: Bed/chair exit alarm,Door open when patient unattended    Medication Interventions: Bed/chair exit alarm,Patient to call before getting OOB,Teach patient to arise slowly    Elimination Interventions: Bed/chair exit alarm,Call light in reach,Patient to call for help with toileting needs              Problem: Patient Education: Go to Patient Education Activity  Goal: Patient/Family Education  Outcome: Progressing Towards Goal

## 2022-06-29 NOTE — PROGRESS NOTES
EEG reviewed and dictated  Diffuse slowing and disorganization indicative of an encephalopathy and may also be seen in chronic neurodegenerative disorder such as dementia  No epileptiform abnormalities    Janell Heimlich, MD

## 2022-06-29 NOTE — PROGRESS NOTES
Bedside shift change report given to Fidel Villarreal (oncoming nurse) by Nikki Rothman  (offgoing nurse). Report included the following information SBAR, Kardex, MAR and Recent Results.

## 2022-06-29 NOTE — PROGRESS NOTES
[facility] Adult  Hospitalist Group                                                                                          Hospitalist Progress Note  Fortino Gowers, MD          Date of Service:  2022  NAME:  Donny Orona  :  1940  MRN:  [id number]      Admission Summary:   Admitted for acute agitation on top of chronic dementia, needs placement       Interval history / Subjective:   Patient remains mostly unresponsive, only responds to pain    Goals of care are the primary area of importance for today. Palliative care consult is pending and appreciated     Assessment & Plan:         #Agitation/altered mental status  Likely a consequence of worsening underlying dementia  -Have discontinued Haldol  -Have added Seroquel 25 mg nightly  -O00, folic acid, TSH levels pending for dementia work-up  -Psychiatric consultation pending and appreciated    -patient had episode of unresponsiveness along with bradycardia and was evaluated for possible code stroke. CT of the head was negative. Appreciate neurology input, no evidence of acute stroke. Less likely just consequence of advanced dementia  RECOMMENDATIONS  1. Will do EEG  2. Will check ammonia  3. Advise bright light exposure during the day to address sun downing  4. Cardiology to address bradycardia  5. Already on Namenda 10 mg BID for dementia     patient mostly unresponsive   -Appreciate neurology input, but no clear answer  -Mostly concerned that this is end-stage dementia. Have ordered palliative consult to address goals of care with patient's wife  -Labs all within normal limits, no other obvious source of acute metabolic encephalopathy    #QT prolongation  QTC was 502  -Risperidone has been discontinued  -Supplemental magnesium given IV  -Keep potassium greater than 4 and magnesium greater than 2  -Repeat EKG in a.m. -resolved    #Bradycardia  Heart rate as low as 33.   Continue to hold Coreg  -Rate is now in the [de-identified]  -Appreciate cardiology input    June 28-patient continues to be intermittently bradycardic with heart rate as low as the 30s. Appreciate input from cardiology service   \"AFib:  Unknown duration.  Doubt any utility in initiating anticoagulation given severe dementia.  Tendency to bradycardia due to underlying conduction system disease (LBBB): stopped coreg.  Aggressive comfort care.  I do not see the utility in treating bradycardia in this very confused/demented DNR man. I would not pursue PPM.  If SOMETHING MUST be done, one could consider theophylline 300mg bid, though this may aggravate his agitation. My ultimate solution is to stop telemetry. \"       #Hypertension  Still high in the 170s over 80s  -Appreciate cardiology input       Code status:  DNR  Prophylaxis: Lovenox  Care Plan discussed with: Nursing  Anticipated Disposition: Memory care unit when stable     Hospital Problems  Date Reviewed: 4/3/2018          Codes Class Noted POA    Agitation ICD-10-CM: R45.1  ICD-9-CM: 307.9  6/24/2022 Unknown                Review of Systems:   Patient uncooperative with questioning      Vital Signs:    Last 24hrs VS reviewed since prior progress note. Most recent are:  Visit Vitals  BP (!) 142/98 (BP 1 Location: Right upper arm, BP Patient Position: At rest)   Pulse 89   Temp 97.2 °F (36.2 °C)   Resp 13   Ht 6' 1\" (1.854 m)   Wt 93.4 kg (206 lb)   SpO2 96%   BMI 27.18 kg/m²         Intake/Output Summary (Last 24 hours) at 6/29/2022 1245  Last data filed at 6/29/2022 5575  Gross per 24 hour   Intake 1416.25 ml   Output 400 ml   Net 1016.25 ml        Physical Examination:     I had a face to face encounter with this patient and independently examined them on 6/29/2022 as outlined below:          Constitutional:  Sleeping, unresponsive other than withdrawing to pain   ENT:  Oral mucosa moist, oropharynx benign. Resp:  CTA bilaterally. No wheezing/rhonchi/rales. No accessory muscle use.     CV:  Regular rhythm, normal rate, no murmurs, gallops, rubs    GI:  Soft, non distended, non tender. normoactive bowel sounds, no hepatosplenomegaly     Musculoskeletal:  No edema, warm, 2+ pulses throughout    Neurologic:  Spontaneously moves all extremities. Does not follow commands, uncooperative, only withdraws to sternal rub            Data Review:    Review and/or order of clinical lab test      Labs:     Recent Labs     06/29/22  0809   WBC 7.8   HGB 15.5   HCT 44.4        Recent Labs     06/29/22  0809      K 3.8      CO2 27   BUN 8   CREA 0.54*   *   CA 8.8   MG 2.0     Recent Labs     06/29/22  0809   LPSE 195     No results for input(s): INR, PTP, APTT, INREXT, INREXT in the last 72 hours. No results for input(s): FE, TIBC, PSAT, FERR in the last 72 hours. Lab Results   Component Value Date/Time    Folate 9.1 06/26/2022 06:22 AM      No results for input(s): PH, PCO2, PO2 in the last 72 hours. No results for input(s): CPK, CKNDX, TROIQ in the last 72 hours.     No lab exists for component: CPKMB  No results found for: CHOL, CHOLX, CHLST, CHOLV, HDL, HDLP, LDL, LDLC, DLDLP, TGLX, TRIGL, TRIGP, CHHD, CHHDX  Lab Results   Component Value Date/Time    Glucose (POC) 79 06/28/2022 09:03 AM     Lab Results   Component Value Date/Time    Color YELLOW/STRAW 06/23/2022 11:10 PM    Appearance CLEAR 06/23/2022 11:10 PM    Specific gravity 1.006 06/23/2022 11:10 PM    pH (UA) 5.5 06/23/2022 11:10 PM    Protein Negative 06/23/2022 11:10 PM    Glucose Negative 06/23/2022 11:10 PM    Ketone Negative 06/23/2022 11:10 PM    Bilirubin Negative 06/23/2022 11:10 PM    Urobilinogen 1.0 06/23/2022 11:10 PM    Nitrites Negative 06/23/2022 11:10 PM    Leukocyte Esterase Negative 06/23/2022 11:10 PM    Epithelial cells FEW 06/23/2022 11:10 PM    Bacteria Negative 06/23/2022 11:10 PM    WBC 0-4 06/23/2022 11:10 PM    RBC 0-5 06/23/2022 11:10 PM         Medications Reviewed:     Current Facility-Administered Medications Medication Dose Route Frequency    QUEtiapine (SEROquel) tablet 25 mg  25 mg Oral BID    LORazepam (ATIVAN) 2 mg/mL injection 1 mg  1 mg IntraVENous Q4H PRN    enoxaparin (LOVENOX) injection 40 mg  40 mg SubCUTAneous Q24H    atorvastatin (LIPITOR) tablet 10 mg  10 mg Oral QHS    memantine (NAMENDA) tablet 10 mg  10 mg Oral BID    [Held by provider] risperiDONE (RisperDAL) tablet 0.5 mg  0.5 mg Oral BID    losartan (COZAAR) tablet 25 mg  25 mg Oral DAILY    0.9% sodium chloride infusion  75 mL/hr IntraVENous CONTINUOUS     ______________________________________________________________________  EXPECTED LENGTH OF STAY: 4d 12h  ACTUAL LENGTH OF STAY:          Jody Sigala MD

## 2022-06-29 NOTE — PROGRESS NOTES
0700: Bedside and Verbal shift change report given to Leilani Chowdhury (oncoming nurse) by Blaire Vallejo RN (offgoing nurse). Report included the following information SBAR, Kardex, Accordion and Cardiac Rhythm A FDib .     0800: notified MD of inability to give patient medications due to decreased alertness     1550: Bladder scan shows 250mL in bladder    1645: TRANSFER - OUT REPORT:    Verbal report given to [facility] [address] (name) on Casper Jain  being transferred to 4th floor (unit) for routine progression of care       Report consisted of patients Situation, Background, Assessment and   Recommendations(SBAR). Information from the following report(s) SBAR, Kardex and Accordion was reviewed with the receiving nurse. Lines:   Peripheral IV 06/25/22 Left Antecubital (Active)   Site Assessment Clean, dry, & intact 06/29/22 0726   Phlebitis Assessment 0 06/29/22 0726   Infiltration Assessment 0 06/29/22 0726   Dressing Status Clean, dry, & intact 06/29/22 0726   Dressing Type Transparent 06/29/22 0726   Hub Color/Line Status Pink 06/29/22 0726   Action Taken Open ports on tubing capped 06/29/22 0726   Alcohol Cap Used Yes 06/29/22 0726        Opportunity for questions and clarification was provided. Patient transported with:   Registered Nurse  Tech          This patient was assisted with Intentional Toileting every 2 hours during this shift as appropriate. Documentation of ambulation and output reflected on Flowsheet as appropriate. Purposeful hourly rounding was completed using AIDET and 5Ps. Outcomes of PHR documented as they occurred. Bed alarm in use as appropriate. Dual Suction and ambubag in place.

## 2022-06-29 NOTE — CONSULTS
Palliative Medicine Consult  Bogdan: [phone])    Patient Name: Chauncey Dodd  YOB: 1940    Date of Initial Consult: June 29, 2022  Reason for Consult:  Overwhelming Symptoms  Requesting Provider: Dr. Jeevan Ponce  Primary Care Physician: Jaylene Hayes MD     SUMMARY:   Chauncey Dodd is a 79 y.o. with a past history of advance dementia, HTN, HLD, who was admitted on 6/23/2022 from home with a diagnosis of agitation, AMS. Workup neg for code stroke after an episode of unresponsiveness. Intermittent bradycardia~ seen by cardiology and no interventions recommended. Current medical issues leading to Palliative Medicine involvement include:support with care decisions given advance dementia. Social: lives at home with wife. Wife no longer able to care for the pt at home and searching for facility. 2 daughters and one son. No Spiritism affiliation but raised [facility].  Former salesman. Baseline: agitated a lot, able to eat intermittently, ambulatory,     DNR     PALLIATIVE DIAGNOSES:   1. Agitation  2. Feeding difficulties  3. Dementia with behavioral disturbance  4. Palliative Care Encounter     PLAN:   1. Pt seen along with Gilma AREVALOW. No family at the bedside  2. Pt hypersomnolent and unable to provide any information  3. Called wife to offer support  4. We discussed the patient's condition in detail, indications of advanced dementia, symptoms, disposition, hospice care, questions, concerns. 5. Wife is saddened by the patient's state and that he is unable to return home. She is working with case management on placement. 6. Explained the hospice benefit and how they could help support at this time. 7. Pt may meet criteria for inpatient hospice due to agitation  8. Decisions: Wife agreed to hospice enrollment. She understands that hospice will continue to follow if he is released to a nursing home. Hospice consult placed  9.  Symptoms: pt sedated with ativan. Haldol was discontinued and orders for Risperdal and seroquel in place. None of those agents given yet today due to hypersomnolence. 10. Will continue to follow up  11. Initial consult note routed to primary continuity provider and/or primary health care team members  12. Communicated plan of care with: Palliative IDTAnnabella 192 Team     GOALS OF CARE / TREATMENT PREFERENCES:     GOALS OF CARE:  Patient/Health Care Proxy Stated Goals: Other (comment) (hospice consult)    TREATMENT PREFERENCES:   Code Status: DNR    Patient and family's personal goals include: control agitation     Advance Care Planning:  [] The [facility] Interdisciplinary Team has updated the ACP Navigator with [address] and Patient Capacity       Primary Decision Maker (Active): Harriet Lloyd - [facility] - [phone]    No flowsheet data found. Medical Interventions: Limited additional interventions       Other:    As far as possible, the palliative care team has discussed with patient / health care proxy about goals of care / treatment preferences for patient.      HISTORY:     History obtained from: chart, team    CHIEF COMPLAINT: pt admitted with aforementioned history and issues    HPI/SUBJECTIVE:    The patient is:   [] Verbal and participatory  [x] Non-participatory due to:   dementia     Clinical Pain Assessment (nonverbal scale for severity on nonverbal patients):   Clinical Pain Assessment  Severity: 0     Activity (Movement): Lying quietly, normal position    Duration: for how long has pt been experiencing pain (e.g., 2 days, 1 month, years)  Frequency: how often pain is an issue (e.g., several times per day, once every few days, constant)     FUNCTIONAL ASSESSMENT:     Palliative Performance Scale (PPS):  PPS: 20       PSYCHOSOCIAL/SPIRITUAL SCREENING:     Palliative IDT has assessed this patient for cultural preferences / practices and a referral made as appropriate to needs [employer], Patient Advocacy, Ethics, etc.)    Any spiritual / Taoist concerns:  [] Yes /  [x] No   If \"Yes\" to discuss with pastoral care during IDT     Does caregiver feel burdened by caring for their loved one:   [] Yes /  [x] No /  [] No Caregiver Present/Available [] No Caregiver [] Pt [facility] at [facility]  If \"Yes\" to discuss with social work during IDT    Anticipatory grief assessment:   [x] Normal  / [] Maladaptive     If \"Maladaptive\" to discuss with social work during IDT    ESAS Anxiety: Anxiety: 4    ESAS Depression:          REVIEW OF SYSTEMS:     Positive and pertinent negative findings in ROS are noted above in HPI. The following systems were [x] reviewed / [x] unable to be reviewed as noted in HPI  Other findings are noted below. Systems: constitutional, ears/nose/mouth/throat, respiratory, gastrointestinal, genitourinary, musculoskeletal, integumentary, neurologic, psychiatric, endocrine. Positive findings noted below. Modified ESAS Completed by: provider   Fatigue: 10 Drowsiness: 10     Pain: 0   Anxiety: 4       Dyspnea: 0           Stool Occurrence(s): 0        PHYSICAL EXAM:     From RN flowsheet:  Wt Readings from Last 3 Encounters:   06/28/22 206 lb (93.4 kg)   03/27/18 230 lb (104.3 kg)   06/09/12 230 lb (104.3 kg)     Blood pressure 132/81, pulse 89, temperature 97.4 °F (36.3 °C), resp. rate 13, height 6' 1\" (1.854 m), weight 206 lb (93.4 kg), SpO2 97 %.     Pain Scale 1: Visual  Pain Intensity 1: 0                 Last bowel movement, if known:     Constitutional: somnolent  Eyes: closed  ENMT: no nasal discharge, moist mucous membranes  Cardiovascular:t  Respiratory: breathing not labored, symmetric  Gastrointestinal: soft non-tender, +bowel sounds  Musculoskeletal: no deformity  Skin: warm, dry  Neurologic: advance dementia  Psychiatric: agitation   Other:       HISTORY:     Active Problems:    Agitation (6/24/2022)      Past Medical History:   Diagnosis Date    Skin cancer       Past Surgical History:   Procedure Laterality Date    HX MOHS PROCEDURES  03/27/2018    BCC R scaphoid fossa by Dr. Smyth Bowels History   Problem Relation Age of Onset    Cancer Mother       History reviewed, no pertinent family history. Social History     Tobacco Use    Smoking status: Former Smoker    Smokeless tobacco: Never Used   Substance Use Topics    Alcohol use: Not on file     No Known Allergies   Current Facility-Administered Medications   Medication Dose Route Frequency    QUEtiapine (SEROquel) tablet 25 mg  25 mg Oral BID    LORazepam (ATIVAN) 2 mg/mL injection 1 mg  1 mg IntraVENous Q4H PRN    enoxaparin (LOVENOX) injection 40 mg  40 mg SubCUTAneous Q24H    atorvastatin (LIPITOR) tablet 10 mg  10 mg Oral QHS    memantine (NAMENDA) tablet 10 mg  10 mg Oral BID    [Held by provider] risperiDONE (RisperDAL) tablet 0.5 mg  0.5 mg Oral BID    losartan (COZAAR) tablet 25 mg  25 mg Oral DAILY    0.9% sodium chloride infusion  75 mL/hr IntraVENous CONTINUOUS          LAB AND IMAGING FINDINGS:     Lab Results   Component Value Date/Time    WBC 7.8 06/29/2022 08:09 AM    HGB 15.5 06/29/2022 08:09 AM    PLATELET 743 53/69/3171 08:09 AM     Lab Results   Component Value Date/Time    Sodium 142 06/29/2022 08:09 AM    Potassium 3.8 06/29/2022 08:09 AM    Chloride 108 06/29/2022 08:09 AM    CO2 27 06/29/2022 08:09 AM    BUN 8 06/29/2022 08:09 AM    Creatinine 0.54 (L) 06/29/2022 08:09 AM    Calcium 8.8 06/29/2022 08:09 AM    Magnesium 2.0 06/29/2022 08:09 AM    Phosphorus 3.1 06/23/2022 10:44 PM      Lab Results   Component Value Date/Time    Alk.  phosphatase 82 06/23/2022 10:44 PM    Protein, total 6.5 06/23/2022 10:44 PM    Albumin 3.5 06/23/2022 10:44 PM    Globulin 3.0 06/23/2022 10:44 PM     No results found for: INR, PTMR, PTP, PT1, PT2, APTT, INREXT, INREXT   No results found for: IRON, FE, TIBC, IBCT, PSAT, FERR   No results found for: PH, PCO2, PO2  No components found for: [place]   Lab Results Component Value Date/Time    CK 49 03/10/2021 03:58 AM                Total time: 70 min  Counseling / coordination time, spent as noted above: 60 min  > 50% counseling / coordination?: y    Prolonged service was provided for  []30 min   []75 min in face to face time in the presence of the patient, spent as noted above. Time Start:   Time End:   Note: this can only be billed with 72076 (initial) or 67294 (follow up). If multiple start / stop times, list each separately.

## 2022-06-29 NOTE — PROCEDURES
[facility] [address]  EEG    Name:  Michael Mejia  MR#:  [id number]  :  1940  ACCOUNT #:  [de-identified]  DATE OF SERVICE:  2022      REFERRING PROVIDER:  Brenda Shepard. Antonino Mason MD    CLINICAL HISTORY:  An EEG is requested on this 79year-old gentleman to evaluate for epileptiform abnormality. MEDICATIONS:  1. Losartan. 2.  Lovastatin. 3.  Coreg. 4.  Namenda. EEG REPORT:  This tracing is obtained while the patient is noted to be in the awake state. During this state, the background consists of diffuse mixed alpha and theta range frequency with intervening periods of delta. Overriding patient movement muscle artifact present. Activating procedure is not performed. Sleep is not attained. IMPRESSION/INTERPRETATION:  This EEG recorded during the awake state is abnormal secondary to diffuse slowing and disorganization of the background rhythms indicative of a moderate-to-severe degree of bihemispheric dysfunction as is commonly seen with an encephalopathy which may have contributions from toxic, metabolic, diffuse structural, and/or pharmacologic effects and clinical correlation recommended. No epileptiform abnormalities are seen.       Zana Bailey MD      [id number]  D:  2022 16:39  T:  2022 16:56  JOB #:  [id number] The patient is a 14y Male complaining of head injury.

## 2022-06-29 NOTE — PROGRESS NOTES
0630: Output greater than documented amount. 0730: Bedside and Verbal shift change report given to Leilani Chowdhury (oncoming nurse) by Juwan Abdi RN (offgoing nurse). Report included the following information SBAR, Kardex, ED Summary, Intake/Output, MAR, Recent Results and Cardiac Rhythm Afib.

## 2022-06-29 NOTE — PROGRESS NOTES
Comprehensive Nutrition Assessment    Type and Reason for Visit: RD nutrition re-screen/LOS    Nutrition Recommendations/Plan:   1. Add HS snack/supplement for patient if he is up at night  2. Would not recommend nutrition support in advanced dementia patient as it does not usually tolerated/accepted     Malnutrition Assessment:  Malnutrition Status: Moderate malnutrition (06/29/22 1403)    Context:  Chronic illness     Findings of the 6 clinical characteristics of malnutrition:   Energy Intake:  Unable to assess  Weight Loss:  Mild weight loss (specify amount and time period) (10% wt loss over unclear period of time (?recent up to 4 years))     Body Fat Loss:  Mild body fat loss, Triceps,Orbital   Muscle Mass Loss:  Mild muscle mass loss, Temples (temporalis),Scapula (trapezius),Calf (gastrocnemius)  Fluid Accumulation:  No significant fluid accumulation,     Strength:  Not performed     Nutrition Assessment:      1029: 79year old male admitted for Agitation [R45.1] who  has a past medical history of Skin cancer. RD attended & discussed patient during interdisciplinary rounds on unit. Palliative consult pending. Pt is reported to be sleeping through some meals and too agitated at other times. Yesterday patient ate some of dinner meal and today has slept through B'fast and Lunch. DW RN & she says he is not alert enough to eat. Pt getting set up for EEG when RD visited. Pt is laying flat, not rousing, Nutrition Focused Physical Exam is completed with results as above. No family at bedside. He was living at home with his wife PTA. Supplement of Ensure Enlive provides 350 kcals, 20 g protein and 45 g carbs  HS snack will be added of turkey/ham sandiwch & baked chips       Nutrition Related Findings:      Wound Type: None    Last Bowel Movement Date: 06/25/22  Stool Appearance: Formed (per sitter)  Abdominal Assessment: Soft,Intact,Obese  Appetite: Poor  Bowel Sounds: Active   Edema:No data recorded    Nutr. Labs:  Lab Results   Component Value Date/Time    Sodium 142 06/29/2022 08:09 AM    Potassium 3.8 06/29/2022 08:09 AM    Chloride 108 06/29/2022 08:09 AM    CO2 27 06/29/2022 08:09 AM     Lab Results   Component Value Date/Time    Glucose 107 (H) 06/29/2022 08:09 AM    Glucose (POC) 79 06/28/2022 09:03 AM     No results found for: HBA1C, KMD8ZROX, HPZ8DXJN, YYP8VNNV    Nutr. Meds:  Lipitor, Seroquel, Lovenox     Current Nutrition Intake & Therapies:  Average Meal Intake: 0%  Average Supplement Intake: None ordered  ADULT DIET Regular    Documented Meal intake:  Patient Vitals for the past 168 hrs:   % Diet Eaten   06/28/22 2158 51 - 75%   06/28/22 1500 0%   06/28/22 1100 0%   06/28/22 0729 0%   06/27/22 1200 0%   06/27/22 0716 0%   06/26/22 1000 1 - 25%   06/25/22 1800 26 - 50%     Documentation of supplement intake:  Patient Vitals for the past 168 hrs:   Supplement intake %   06/28/22 1500 0%   06/28/22 1100 0%   06/28/22 0729 0%   06/27/22 1200 0%   06/27/22 0716 0%     Anthropometric Measures:  Height: 6' 1\" (185.4 cm)  Ideal Body Weight (IBW): 184 lbs (84 kg)  Admission Body Weight: 202 lb 13.2 oz  Current Body Wt:  93.4 kg (205 lb 14.6 oz), 111.9 % IBW. Bed scale  Current BMI (kg/m2): 27.2        Weight Adjustment: No adjustment                 BMI Category: Overweight (BMI 25.0-29. 9)  Last 3 Recorded Weights in this Encounter    06/23/22 2210 06/28/22 0348   Weight: 92 kg (202 lb 12.8 oz) 93.4 kg (206 lb)     Wt Readings from Last 10 Encounters:   06/28/22 93.4 kg (206 lb)   03/27/18 104.3 kg (230 lb)   06/09/12 104.3 kg (230 lb)       Estimated Daily Nutrient Needs:  Energy Requirements Based On: Formula  Weight Used for Energy Requirements: Current  Energy (kcal/day): 2200 - 2365 kcal/d (MSJ xAF 1.3 - 1.4)  Weight Used for Protein Requirements: Current  Protein (g/day): 75 - 103 g/d (0.8 - 1.1 g/kg)  Method Used for Fluid Requirements: 1 ml/kcal  Fluid (ml/day): 2200 ml/d    Nutrition Diagnosis: · Moderate malnutrition related to cognitive or neurological impairment,inadequate protein-energy intake as evidenced by intake 0-25%,weight loss,Criteria as identified in malnutrition assessment,moderate muscle loss,moderate loss of subcutaneous fat, wt appears down by 10% from last record from 4 years ago and pt is not alert to accept PO for most daytime hours. Nutrition Interventions:   Food and/or Nutrient Delivery: Modify current diet  Nutrition Education/Counseling: Education not appropriate  Coordination of Nutrition Care: Continue to monitor while inpatient,Coordination of care,Interdisciplinary rounds,Feeding assistance/environmental change  Plan of Care discussed with: RN and team during IDRs    Goals:     Goals: PO intake 50% or greater,within 2 days       Nutrition Monitoring and Evaluation:   Behavioral-Environmental Outcomes: None identified  Food/Nutrient Intake Outcomes: Food and nutrient intake  Physical Signs/Symptoms Outcomes: Weight,Meal time behavior    Discharge Planning:     Too soon to determine    Taty Woodard RD, MS  Contact via [employer] or office [phone]

## 2022-06-30 PROBLEM — I10 HTN (HYPERTENSION), BENIGN: Status: ACTIVE | Noted: 2022-01-01

## 2022-06-30 PROBLEM — F03.90 DEMENTIA (HCC): Status: ACTIVE | Noted: 2022-01-01

## 2022-06-30 NOTE — PROGRESS NOTES
KALEB:   1) LTC placement vs [facility]   2) Pt will need 2ND IM letter at time of discharge   3) Pt will need [facility] transportation at time of discharge   4) Risk of readmission- 11% LOW     Hospital Day:   2:31 PM- Pt remains on Ortho- CM spoke with Ang Mike with [facility]- pt does not currently qualify for inpatient hospice- they have accepted pt at the [facility] pending bed availability. [address] [place] [address] [address] has also accepted pt for LTC under private pay but pt's family does not wish to d/c today- wishes to go view the facility tomorrow and consider all options before discharging. Pt remains on [facility] will call. CM will continue to follow and assist as needed.      Maisha Garcia, MSW, [address]

## 2022-06-30 NOTE — HOSPICE
[facility]  [phone]     Patient Name: Chauncey Dodd  YOB: 1940  Age: 79 y.o. [facility] RN Note:  Per chart review, vital signs appear stable and patient has not received any medications for symptom mgmt overnight. Documented as alert and oriented to person. Will assess in person this morning. Would recommend ordering SL medications for any symptoms that may arise as patient does not have IV access. Plan to meet w/ wife once she arrives to bedside (around 11am)  to discuss options for disposition. CM may need to explore options for LTC placement if patient does not meet criteria for IP hospice. 11:00 Assessed patient at bedside. Patient briefly opened eyes and was able to state good morning but then returned to sleeping. Mild restlessness at times, particularly if being touched but settled down quickly without needing medical/medication interventions. No IV access. Dicussed hospice levels of care and places of service with with wife, daughter and granddaughter who were all at bedside. Wife states that patient has been increasingly declining in ability to participate in [address] [employer], increasingly more challenging to provide care for with little PO intake. Patient was able to take PO pills this morning. She does not believe she would be able to care for him at home. Dr Joanie Espinoza present for conversation and able to discuss in detail patient's medical condition. Explained work up of other possible causes of decline had been negative including infection and stoke and that this was likely the progression of his dementia. Family given ample opportunity to ask questions and address concerns. Dr Joanie Espinoza will also ensure SL medications are ordered for symptom mgmt if needed. Wife states that she cannot care for him at home as she is the sole caregiver. Tentative plan would be to go to [facility] with hospice support.  [facility] is not within [place]'s area so family would need to use alternate agency to provide services. D/W ROMY Flores who will follow up w/ [facility]. Liaison to follow closely as patient could decline further over the weekend warranting IP hospice admission. Thank you for the opportunity to be of service to this patient.

## 2022-06-30 NOTE — PROGRESS NOTES
TRANSFER - OUT REPORT:    Verbal report given to Mark Denver (name) on Job Nipper  (patient name)  being transferred to [address] [place] [address] (unit) for routine progression of care   Report consisted of patients Situation, Background, Assessment and   Recommendations(SBAR).       Augustina Pineda RN, MSN/Care manager  [phone]

## 2022-06-30 NOTE — PROGRESS NOTES
Bedside and Verbal shift change report given to Hannah RN (oncoming nurse) by Serafin Russell RN (offgoing nurse). Report included the following information SBAR.

## 2022-06-30 NOTE — ACP (ADVANCE CARE PLANNING)
Primary Decision Maker (Active): RobsonHarriet mccloud - [facility] - [phone]  Advance Care Planning 6/30/2022   Patient's Healthcare Decision Maker is: Legal Next of Kin     Pt does not have AMD on file, is unable to complete due to advanced dementia. In absence of verified Medical POA, wife Bao Vargas is legal NOK and surrogate decision maker.

## 2022-06-30 NOTE — PROGRESS NOTES
Bedside and Verbal shift change report given to [facility] [employer] (oncoming nurse) by Lionel Renteria LPN (offgoing nurse). Report included the following information SBAR, Procedure Summary, Intake/Output and MAR.

## 2022-06-30 NOTE — PROGRESS NOTES
[facility]  [address] [facility], [place], [address] [place] [address]  [phone]      Medical Progress Note      NAME: Ron Vasquez   :  1940  MRM:  [id number]    Date/Time: 2022  2:06 PM         Subjective:     Chief Complaint:  Pt seen and examined. Family at the bedside. Pt currently somnolent but restless. See ACP note re: goal of care     ROS:  (bold if positive, if negative)             Objective:       Vitals:          Last 24hrs VS reviewed since prior progress note. Most recent are:    Visit Vitals  /86 (BP 1 Location: Right upper arm, BP Patient Position: At rest)   Pulse 100   Temp 97.4 °F (36.3 °C)   Resp 16   Ht 6' 1\" (1.854 m)   Wt 93.4 kg (206 lb)   SpO2 96%   BMI 27.18 kg/m²     SpO2 Readings from Last 6 Encounters:   22 96%   03/10/21 98%   18 95%   12 98%    O2 Flow Rate (L/min): 2 l/min       Intake/Output Summary (Last 24 hours) at 2022 1406  Last data filed at 2022 0851  Gross per 24 hour   Intake 1095 ml   Output 750 ml   Net 345 ml          Exam:     Physical Exam:    Gen: Chronically ill appearing male  HEENT:  Pink conjunctivae, PERRL, hearing intact to voice, moist mucous membranes  Neck:  Supple, without masses, thyroid non-tender  Resp:  No accessory muscle use, clear breath sounds without wheezes rales or rhonchi  Card:  No murmurs, normal S1, S2 without thrills, bruits or peripheral edema  Abd:  Soft, non-tender, non-distended, normoactive bowel sounds are present  Musc:  No cyanosis or clubbing  Skin:  No rashes or ulcers, skin turgor is good  Neuro: Resisting eye opening.  Using bilateral UE's to prevent examination of his eyes   Psych: No insight     Medications Reviewed: (see below)    Lab Data Reviewed: (see below)    ______________________________________________________________________    Medications:     Current Facility-Administered Medications   Medication Dose Route Frequency    acetaminophen (TYLENOL) tablet 650 mg  650 mg Oral Q6H PRN    haloperidol lactate (HALDOL) injection 2 mg  2 mg IntraVENous Q6H PRN    ondansetron (ZOFRAN) injection 4 mg  4 mg IntraVENous Q6H PRN    haloperidol (HALDOL) 2 mg/mL oral solution 2 mg  2 mg Oral Q6H PRN    enoxaparin (LOVENOX) injection 40 mg  40 mg SubCUTAneous Q24H    atorvastatin (LIPITOR) tablet 10 mg  10 mg Oral QHS    memantine (NAMENDA) tablet 10 mg  10 mg Oral BID    losartan (COZAAR) tablet 25 mg  25 mg Oral DAILY    0.9% sodium chloride infusion  75 mL/hr IntraVENous CONTINUOUS            Lab Review:     Recent Labs     06/30/22  0427 06/29/22  0809   WBC 7.4 7.8   HGB 15.6 15.5   HCT 44.8 44.4    186     Recent Labs     06/30/22  0427 06/29/22  0809    142   K 3.5 3.8    108   CO2 23 27   GLU 90 107*   BUN 7 8   CREA 0.52* 0.54*   CA 8.5 8.8   MG 1.8 2.0     No components found for: GLPOC         Assessment / Plan:   Delirium on dementia - head CT, UA, TSH/B12/folate/ammonia WNL - suspect 2/2 progression of dementia   -due to degree of sedation hold risperdal  -add haldol PRN   -continue Namenda   -palliative care on board; looking to potentially d/c to SNF with hospice    Hypertension   -on ARB   -hold metoprolol due to bradycardia    Bradycardia - ?2/2 beta blocker. Appears to have resolved   -hold coreg   -TSH WNL   -appreciate cardiology eval    Dispo   -SNF with hospice?      Total time spent with patient: 28 [address]95 [address] discussed with: Patient and Family    Discussed:  Care Plan    Prophylaxis:  Lovenox    Disposition:  TBD            ___________________________________________________    Attending Physician: Duane Mai MD

## 2022-06-30 NOTE — ACP (ADVANCE CARE PLANNING)
Advance Care Planning Note     Name: Kindra Lisa   YOB: 1940   MRN: [id number]   Admission Date: 6/23/2022  9:49 PM     Date of discussion:  6/30/2022         Active Diagnoses: Active Problems:    Agitation (6/24/2022)           These active diagnoses are of sufficient risk that focused discussion on advance care planning is indicated in order to allow the patient to thoughtfully consider personal goals of care; and, if situations arise that prevent the ability to personally give input, to ensure appropriate representation of their personal desires for different levels and aggressiveness of care. Discussion:      Discussion: Pt's wife and family members present at time of discussion. Discussed negative encephalopathy work-up and my suspicion that this is progression of underlying dementia. Per family seems to have been declining over time. Now has not been tolering nor asking for anything PO. Family is amenable to hospice but do not think they can take him home. The are interested in SNF with hospice      Time Spent:     Total time spent face-to-face in education and discussion: 16 minutes.              Redell Aase, MD

## 2022-06-30 NOTE — PROGRESS NOTES
Problem: Pressure Injury - Risk of  Goal: *Prevention of pressure injury  Description: Document Blaise Scale and appropriate interventions in the flowsheet. Outcome: Progressing Towards Goal  Note: Pressure Injury Interventions:  Sensory Interventions: Assess changes in LOC    Moisture Interventions: Internal/External urinary devices,Absorbent underpads    Activity Interventions: Pressure redistribution bed/mattress(bed type)    Mobility Interventions: Turn and reposition approx. every two hours(pillow and wedges)    Nutrition Interventions: Offer support with meals,snacks and hydration,Document food/fluid/supplement intake    Friction and Shear Interventions: Minimize layers                Problem: Patient Education: Go to Patient Education Activity  Goal: Patient/Family Education  Outcome: Progressing Towards Goal     Problem: Falls - Risk of  Goal: *Absence of Falls  Description: Document Bobby Fall Risk and appropriate interventions in the flowsheet.   Outcome: Progressing Towards Goal  Note: Fall Risk Interventions:  Mobility Interventions: Bed/chair exit alarm    Mentation Interventions: Bed/chair exit alarm    Medication Interventions: Bed/chair exit alarm    Elimination Interventions: Bed/chair exit alarm              Problem: Patient Education: Go to Patient Education Activity  Goal: Patient/Family Education  Outcome: Progressing Towards Goal

## 2022-07-01 NOTE — PROGRESS NOTES
.Post Fall Documentation    Moni Haley witnessed/unwitnessed fall occurred on  6/30/22(Date) at 11:05PM (Time). The answers to the following questions summarize the fall: In the patient's own words:  · What were you attempting to do when you fell? Pt is not verbalizing   · Do you know why you fell? Pt is not verbalizing  · Do you have any pain/discomfort or any other complaints? \"No\"  · Which part of your body made contact with the floor or other object? Pt is not verbalizing   Nurse:  Brianne Thomas Was this an assisted fall? no   Was fall witnessed? No   If witnessed, what part of the body made contact with the floor or other object? N/A   Patients mental status after the fall/when found: Confused   Any apparent injury:  Other Skin tear L Elbow, L Hand, R elbow   Immediate interventions for injury/suspected injury? Dressing and Other Paged MD for Head CT   Patient assisted back to bed? Other Assist x 4   Name of provider notified and time, any comments? 11:05PM Attempted to call in house Hospitalist (Dr. Jose Solomon) with no answer. 11:07 PM paged Dr. Jose Solomon. No answer. Dr. Beth Dutta, off site on call MD. Dr Beth Dutta paged at  11:17PM Currently have not received a response from either         Dr. Beth Dutta placed order for STAT head CT at 11:30PM, requested in house MD assess pt. Olivia Benedict paged again at 11:34pm to request bedside assessment.  11:50 PM Dr. Jose Solomon at bedside to assess pt. Head CT completed.  Name of family member notified and time: Snow Double, Wife. 11:43 PM    Document Immediate VS and physical assessment in flowsheets. Document Neuro assessment every hour x 4 (for potential head injury or unwitnessed fall) in flowsheets.         Radha Conti RN

## 2022-07-01 NOTE — PROGRESS NOTES
07:30  Bedside and Verbal shift change report given to [employer] (oncoming nurse) by Megan Joseph (offgoing nurse). Report included the following information SBAR, Intake/Output, MAR and Recent Results.

## 2022-07-01 NOTE — HOSPICE
Hospice RN Note: liaison bedside with patient, wife Ellen Gupta, granddaughter Lisa Dash and Dilan Armenta. Throughout discussion, patient was resting comfortably in no apparent distress. Did not open eyes for communication but was able to say \"I'm feeling ok\". Per RN, he did eat applesauce and drink ensure for breakfast.     Wife, Ellen Gupta has agreed to initiate comfort care today and wants to focus on comfort. Hospice will continue to follow for overwhelming symptom burden that would warrant inpatient hospice admission. Spoke with Dr. Uziel Ravi and made recommendation to keep sublingual Haldol and order sublingual Ativan. D/w bedside RN about utilizing PRN's for agitation/restlessness.

## 2022-07-01 NOTE — PROGRESS NOTES
Problem: Falls - Risk of  Goal: *Absence of Falls  Description: Document Aline Sweell Fall Risk and appropriate interventions in the flowsheet.   Outcome: Progressing Towards Goal  Note: Fall Risk Interventions:  Mobility Interventions: Patient to call before getting OOB,Bed/chair exit alarm,Utilize walker, cane, or other assistive device    Mentation Interventions: Bed/chair exit alarm,More frequent rounding,Familiar objects from home,Toileting rounds,Room close to nurse's station,Evaluate medications/consider consulting pharmacy,Door open when patient unattended    Medication Interventions: Bed/chair exit alarm,Patient to call before getting OOB,Teach patient to arise slowly,Utilize gait belt for transfers/ambulation    Elimination Interventions: Call light in reach,Bed/chair exit alarm,Toilet paper/wipes in reach,Toileting schedule/hourly rounds    History of Falls Interventions: Room close to nurse's station,Door open when patient unattended,Bed/chair exit alarm,Vital signs minimum Q4HRs X 24 hrs (comment for end date),Assess for delayed presentation/identification of injury for 48 hrs (comment for end date)

## 2022-07-01 NOTE — PROGRESS NOTES
KALEB:   1) Comfort Care and then hospice house when bed is available   2) Risk of readmission- 10% LOW      Hospital Day 8:   1:29 PM- Pt remains on Ortho- CM met with pt's wife, granddaughter and hospice liason- plans for pt to transition to comfort measures and then to the hospice house when a bed is available. Pt is high risk of readmitting to the hospital for a fall if agitation symptoms are not managed. CM unable to obtain 2ND IM letter as family is grieving and it is not an appropriate time to discuss. Leticia at [address] [place] [address] is aware pt is no longer coming there. CM will continue to follow and assist as needed.      Janette Hernandez, MSW, [address]

## 2022-07-01 NOTE — PROGRESS NOTES
Comprehensive Nutrition Assessment    Type and Reason for Visit: Reassess    Nutrition Recommendations/Plan:   1. Cancel HS snack/supplement   2. Modify diet order to Pureed   3. Would not recommend nutrition support in advanced dementia patient as it does not usually tolerated/accepted & plans for comfort care     Malnutrition Assessment:  Malnutrition Status: Moderate malnutrition (07/01/22 1314)    Context:  Chronic illness     Findings of the 6 clinical characteristics of malnutrition:   Energy Intake:  Mild decrease in energy intake (specify)  Weight Loss:  Mild weight loss (specify amount and time period) (10% wt loss over unclear period of time (?recent up to 4 years))     Body Fat Loss:  Mild body fat loss, Triceps,Orbital   Muscle Mass Loss:  Mild muscle mass loss, Temples (temporalis),Scapula (trapezius),Calf (gastrocnemius)  Fluid Accumulation:  No significant fluid accumulation,     Strength:  Not performed     Nutrition Assessment:    7/1: comfort care, moving towards hopsice, pt was awake enough to take some meds early this morning, but now not easy to rouse. Pt is holding solid foods in his mouth or spitting them out per RN. He was able to take meds in applesauce. Hospice and case management are in the room when RD visited, pt not rousing. Discussed with RN and family would like pureed options at bedside if/when he wakes during the night. No new weight. Not appropriate to obtain at this time. 1029: 79year old male admitted for Agitation [R45.1] who  has a past medical history of Skin cancer. RD attended & discussed patient during interdisciplinary rounds on unit. Palliative consult pending. Pt is reported to be sleeping through some meals and too agitated at other times. Yesterday patient ate some of dinner meal and today has slept through B'fast and Lunch. DW RN & she says he is not alert enough to eat. Pt getting set up for EEG when RD visited.  Pt is laying flat, not rousing, Nutrition Focused Physical Exam is completed with results as above. No family at bedside. He was living at home with his wife IVONNE. Cancelled 7/1/22:     Nutrition Related Findings:      Wound Type: None    Last Bowel Movement Date: 06/25/22  Stool Appearance: Formed (per sitter)  Abdominal Assessment: Intact,Soft  Appetite: Poor  Bowel Sounds: Active   Edema:No data recorded    Nutr. Labs:  Lab Results   Component Value Date/Time    Sodium 142 07/01/2022 05:22 AM    Potassium 3.5 07/01/2022 05:22 AM    Chloride 106 07/01/2022 05:22 AM    CO2 26 07/01/2022 05:22 AM     Lab Results   Component Value Date/Time    Glucose 97 07/01/2022 05:22 AM    Glucose (POC) 79 06/28/2022 09:03 AM     No results found for: HBA1C, JAG5KIHD, CLU1NGZW, VOL0VKKM    Nutr. Meds:  Lipitor, Seroquel, Lovenox, cozaar PRN: haldol     Current Nutrition Intake & Therapies:  Average Meal Intake: 1-25%  Average Supplement Intake: 1-25%  ADULT DIET Dysphagia - Pureed; small portions - comfort care patient  DIET ONE TIME MESSAGE    Documented Meal intake:  Patient Vitals for the past 168 hrs:   % Diet Eaten   07/01/22 0839 0%   06/30/22 1814 0%   06/30/22 1200 0%   06/30/22 0900 0%   06/30/22 0851 0%   06/28/22 2158 51 - 75%   06/28/22 1500 0%   06/28/22 1100 0%   06/28/22 0729 0%   06/27/22 1200 0%   06/27/22 0716 0%   06/26/22 1000 1 - 25%   06/25/22 1800 26 - 50%     Documentation of supplement intake:  Patient Vitals for the past 168 hrs:   Supplement intake %   06/30/22 1814 1 - 25%   06/28/22 1500 0%   06/28/22 1100 0%   06/28/22 0729 0%   06/27/22 1200 0%   06/27/22 0716 0%     Anthropometric Measures:  Height: 6' 1\" (185.4 cm)  Ideal Body Weight (IBW): 184 lbs (84 kg)  Admission Body Weight: 202 lb 13.2 oz  Current Body Wt:  93.4 kg (205 lb 14.6 oz), 111.9 % IBW. Bed scale  Current BMI (kg/m2): 27.2        Weight Adjustment: No adjustment                 BMI Category: Overweight (BMI 25.0-29. 9)  Last 3 Recorded Weights in this Encounter 06/23/22 2210 06/28/22 0348   Weight: 92 kg (202 lb 12.8 oz) 93.4 kg (206 lb)     Wt Readings from Last 10 Encounters:   06/28/22 93.4 kg (206 lb)   03/27/18 104.3 kg (230 lb)   06/09/12 104.3 kg (230 lb)       Estimated Daily Nutrient Needs:  Energy Requirements Based On: Formula  Weight Used for Energy Requirements: Current  Energy (kcal/day): 2200 - 2365 kcal/d (MSJ xAF 1.3 - 1.4)  Weight Used for Protein Requirements: Current  Protein (g/day): 75 - 103 g/d (0.8 - 1.1 g/kg)  Method Used for Fluid Requirements: 1 ml/kcal  Fluid (ml/day): 2200 ml/d    Nutrition Diagnosis:   · Moderate malnutrition related to cognitive or neurological impairment,inadequate protein-energy intake as evidenced by intake 0-25%,weight loss,Criteria as identified in malnutrition assessment,moderate muscle loss,moderate loss of subcutaneous fat, wt appears down by 10% from last record from 4 years ago and pt is not alert to accept PO for most daytime hours. Nutrition Interventions:   Food and/or Nutrient Delivery: Modify current diet  Nutrition Education/Counseling: Education not appropriate  Coordination of Nutrition Care: Continue to monitor while inpatient,Coordination of care,Feeding assistance/environmental change,Interdisciplinary rounds  Plan of Care discussed with: RN    Goals:  Previous Goal Met: No progress toward goal(s)  Goals: other (specify)  Specify Other Goals: PO is offered and available as tolerated and desired    Nutrition Monitoring and Evaluation:   Behavioral-Environmental Outcomes:  Other (specify) (acceptance of assistance)  Food/Nutrient Intake Outcomes: Food and nutrient intake  Physical Signs/Symptoms Outcomes: Weight,Meal time behavior    Discharge Planning:    Continue current diet   PO for comfort    Sheri Joseph RD, MS  Contact via [employer] or office [phone]

## 2022-07-02 NOTE — PROGRESS NOTES
3:37 am:  Patient on will call with [facility] for anticipated d/c tomorrow, 7/3/22, to the [facility].     Dianne Goldstein LCSW

## 2022-07-02 NOTE — PROGRESS NOTES
1200: Reached out to [address] to clarify comfort only measures and patient's spouse and daughter wanted to speak with the hospice nurse. Was told I'll receive a call back from Hospice nurse. 1320: Haven't heard back from [address] yet. Reached out again and was told I'll receive a call back from the nurse. 1530: Patient is alert, has attempted to climb out of bed 4x since waking up. Attempted to distract patient with fidget device. Patient threw across the room and removed gown. PRN 2mg/mL Lorazepam administered to patient at 1605 for restlessness. Bedside shift change report given to Deandre (oncoming nurse) by Shani Baum (offgoing nurse). Report included the following information SBAR, Intake/Output, MAR and Recent Results.

## 2022-07-02 NOTE — PROGRESS NOTES
10:05 PM  Dr. Cesar Dennis placed IVF with potassium added for this pt. MD was aware that Pt does not have an IV after pulling multiple ones out. Note from Hospice nurse stating pt's family wishes for comfort measures. Attempted to call Dr. Cesar Dennis since the order just went in to notify him but he did not answer. Notified Dr. Georgia Ramirez of this issue. 07:30  Bedside and Verbal shift change report given to Kristina Recinos  (oncoming nurse) by Do Li (offgoing nurse). Report included the following information SBAR, Intake/Output, MAR and Recent Results.  RN was also notified of no reply from either MD regarding IVF

## 2022-07-02 NOTE — PROGRESS NOTES
Wilfredo K[facility]n [facility]  [address] [facility], [place], [address] [place] [address]  [phone]      Medical Progress Note      NAME:         Edwin Davis   :        1940  MRM:        [id number]    Date of service: 2022      Chief complaint: Confusion    Interval HPI: Patient admitted with persistent confusion. He is NOT able to give any hx. I have discussed with his nurse and daughter for collaborative hx. No new symptoms      Objective:    Vital Signs:    Visit Vitals  /89 (BP 1 Location: Right upper arm, BP Patient Position: At rest)   Pulse 71   Temp 97.4 °F (36.3 °C)   Resp 18   Ht 6' 1\" (1.854 m)   Wt 93.4 kg (206 lb)   SpO2 94%   BMI 27.18 kg/m²          Intake/Output Summary (Last 24 hours) at 2022 1214  Last data filed at 2022 1930  Gross per 24 hour   Intake 487 ml   Output --   Net 487 ml        Physical Examination:    General:   Weak and frail, not in distress but confused   Eyes:   pink conjunctivae, PERRLA with no discharge. ENT:   no ottorrhea or rhinorrhea with dry mucous membranes  Pulm: decreased breath sounds without crackles or wheezes  Card:  has regular and normal S1, S2 without thrills, bruits or peripheral edema  Abd:  Soft, non-distended, normoactive bowel sounds   Musc:  No cyanosis, clubbing, atrophy or deformities. Skin:  No rashes but bruised elbow areas    Neuro: Confused. Restless.  Non focal exam      Current Facility-Administered Medications   Medication Dose Route Frequency    LORazepam (INTENSOL) 2 mg/mL oral concentrate 1 mg  1 mg SubLINGual Q8H PRN    0.9% sodium chloride with KCl 40 mEq/L infusion   IntraVENous CONTINUOUS    acetaminophen (TYLENOL) tablet 650 mg  650 mg Oral Q6H PRN    haloperidol lactate (HALDOL) injection 2 mg  2 mg IntraVENous Q6H PRN    ondansetron (ZOFRAN) injection 4 mg  4 mg IntraVENous Q6H PRN    haloperidol (HALDOL) 2 mg/mL oral solution 2 mg  2 mg Oral Q6H PRN    enoxaparin (LOVENOX) injection 40 mg  40 mg SubCUTAneous Q24H    memantine (NAMENDA) tablet 10 mg  10 mg Oral BID        Laboratory data and review:    Recent Labs     07/02/22  0425 06/30/22 0427   WBC 8.3 7.4   HGB 15.2 15.6   HCT 43.4 44.8    201     Recent Labs     07/02/22  0425 07/01/22  0522 06/30/22 0427    142 140   K 3.2* 3.5 3.5    106 108   CO2 28 26 23   * 97 90   BUN 13 12 7   CREA 0.58* 0.58* 0.52*   CA 8.6 8.8 8.5   MG 1.7 1.7 1.8     No components found for: [place]    Diagnostics: Imaging studies have been reviewed    Assessment and Plan:    Dementia ([address] (6/30/2022) POA: with delirium and likely has progression and worsening of disease. Has intermittent restlessness that led to a fall last night. No apparent acute injuries. CT scan head neg. UA unremarkable. TSH, ammonia levels normal. Seen by hospice and he does not meet criteria for GIP admission. Discussed with hospice. He will be admitted to the [facility] tomorrow 7/3     HTN (hypertension), benign (6/30/2022) / Hyperlipidemia POA: BP stable. Given his advanced dementia, stopped all his medications.                    Care Plan discussed with: Family, Hospice team, Care Manager and Nursing Staff    Discussed:  Care Plan and D/C Planning    Prophylaxis:  Lovenox    Anticipated Disposition: Hospice house           ___________________________________________________    Attending Physician:   Rosey Escobedo MD

## 2022-07-02 NOTE — PROGRESS NOTES
Problem: Pressure Injury - Risk of  Goal: *Prevention of pressure injury  Description: Document Blaise Scale and appropriate interventions in the flowsheet. Outcome: Progressing Towards Goal  Note: Pressure Injury Interventions:  Sensory Interventions: Assess changes in LOC,Float heels,Keep linens dry and wrinkle-free,Maintain/enhance activity level,Minimize linen layers,Monitor skin under medical devices,Turn and reposition approx. every two hours (pillows and wedges if needed)    Moisture Interventions: Absorbent underpads,Check for incontinence Q2 hours and as needed,Limit adult briefs,Minimize layers    Activity Interventions: Pressure redistribution bed/mattress(bed type)    Mobility Interventions: Assess need for specialty bed,Float heels,Pressure redistribution bed/mattress (bed type)    Nutrition Interventions: Document food/fluid/supplement intake    Friction and Shear Interventions: Apply protective barrier, creams and emollients,Lift team/patient mobility team,Minimize layers                Problem: Falls - Risk of  Goal: *Absence of Falls  Description: Document Bobby Fall Risk and appropriate interventions in the flowsheet.   Outcome: Progressing Towards Goal  Note: Fall Risk Interventions:  Mobility Interventions: Bed/chair exit alarm,Communicate number of staff needed for ambulation/transfer    Mentation Interventions: Adequate sleep, hydration, pain control,Bed/chair exit alarm,Family/sitter at bedside,More frequent rounding,Room close to nurse's station,Toileting rounds    Medication Interventions: Bed/chair exit alarm,Evaluate medications/consider consulting pharmacy    Elimination Interventions: Bed/chair exit alarm,Call light in reach,Toileting schedule/hourly rounds    History of Falls Interventions: Bed/chair exit alarm,Door open when patient unattended

## 2022-07-03 NOTE — HOSPICE
[facility]  [phone]     Patient Name: Lety Layton  YOB: 1940  Age: 79 y.o. [facility] RN Note:  Bed at [facility] open for patient today  CTI from Dr Tereza Molina - acute met. Encephalopathy   GIP for agitation and restlessness    Ddnr to travel with patient- wife Bette Verduzco signed via [employer] signed via [employer], [facility]. declined  Eastham Iris TBD- wife is leaning toward [facility] Joy in [place] [phone] negative    Number to [facility] provided to bedside RN to give report to [facility] staff    Thank you for the opportunity to be of service to this patient.

## 2022-07-03 NOTE — PROGRESS NOTES
Problem: Non-Violent Restraints  Goal: Removal from restraints as soon as assessed to be safe  Outcome: Resolved/Met  Goal: No harm/injury to patient while restraints in use  Outcome: Resolved/Met  Goal: Patient's dignity will be maintained  Outcome: Resolved/Met  Goal: Patient Interventions  Outcome: Resolved/Met     Problem: Violent Restraints  Goal: Removal from restraints as soon as assessed to be safe  Outcome: Resolved/Met  Goal: No harm/injury to patient while restraints in use  Outcome: Resolved/Met  Goal: Patient's dignity will be maintained  Outcome: Resolved/Met  Goal: Patient Interventions  Outcome: Resolved/Met     Problem: Pressure Injury - Risk of  Goal: *Prevention of pressure injury  Description: Document Blaise Scale and appropriate interventions in the flowsheet. Outcome: Resolved/Met  Note: Pressure Injury Interventions:  Sensory Interventions: Assess changes in LOC,Keep linens dry and wrinkle-free,Maintain/enhance activity level,Minimize linen layers,Monitor skin under medical devices    Moisture Interventions: Absorbent underpads,Limit adult briefs,Minimize layers    Activity Interventions: Pressure redistribution bed/mattress(bed type)    Mobility Interventions: Assess need for specialty bed,Pressure redistribution bed/mattress (bed type)    Nutrition Interventions: Document food/fluid/supplement intake    Friction and Shear Interventions: Apply protective barrier, creams and emollients                Problem: Patient Education: Go to Patient Education Activity  Goal: Patient/Family Education  Outcome: Resolved/Met     Problem: Falls - Risk of  Goal: *Absence of Falls  Description: Document Bobby Fall Risk and appropriate interventions in the flowsheet.   Outcome: Resolved/Met  Note: Fall Risk Interventions:  Mobility Interventions: Bed/chair exit alarm    Mentation Interventions: Adequate sleep, hydration, pain control,Bed/chair exit alarm,More frequent rounding    Medication Interventions: Bed/chair exit alarm    Elimination Interventions: Bed/chair exit alarm    History of Falls Interventions: Bed/chair exit alarm         Problem: Patient Education: Go to Patient Education Activity  Goal: Patient/Family Education  Outcome: Resolved/Met     Problem: Nutrition Deficit  Goal: *Optimize nutritional status  Outcome: Resolved/Met

## 2022-07-03 NOTE — PROGRESS NOTES
Problem: Pressure Injury - Risk of  Goal: *Prevention of pressure injury  Description: Document Blaise Scale and appropriate interventions in the flowsheet. Outcome: Progressing Towards Goal  Note: Pressure Injury Interventions:  Sensory Interventions: Float heels,Minimize linen layers,Turn and reposition approx. every two hours (pillows and wedges if needed),Assess need for specialty bed,Check visual cues for pain    Moisture Interventions: Absorbent underpads,Minimize layers,Limit adult briefs,Moisture barrier    Activity Interventions: Assess need for specialty bed    Mobility Interventions: Assess need for specialty bed,Float heels,Turn and reposition approx. every two hours(pillow and wedges)    Nutrition Interventions: Document food/fluid/supplement intake    Friction and Shear Interventions: Apply protective barrier, creams and emollients,Lift sheet,Transferring/repositioning devices                Problem: Patient Education: Go to Patient Education Activity  Goal: Patient/Family Education  Outcome: Progressing Towards Goal     Problem: Falls - Risk of  Goal: *Absence of Falls  Description: Document Chao Wahl Fall Risk and appropriate interventions in the flowsheet.   Outcome: Progressing Towards Goal  Note: Fall Risk Interventions:  Mobility Interventions: Bed/chair exit alarm    Mentation Interventions: Adequate sleep, hydration, pain control,Bed/chair exit alarm,Door open when patient unattended    Medication Interventions: Bed/chair exit alarm    Elimination Interventions: Call light in reach,Bed/chair exit alarm              Problem: Patient Education: Go to Patient Education Activity  Goal: Patient/Family Education  Outcome: Progressing Towards Goal

## 2022-07-03 NOTE — H&P
[facility]  [phone]    Patient Name: Alex Bhatti  YOB: 1940    Date of Provider Hospice Visit: 07/03/22    Level of Care:   [x] General Inpatient (GIP)    [] Routine   [] Respite    Current Location of Care:  [] [facility] [] [facility] [] [facility] [] [facility] [x] Hospice [facility], patient referred from:  [] [facility] [x] [facility] [] [facility] [] [facility] [] Home [] Other:     Date of [facility] Admission: 7/3/2022  Hospice Medical Director at time of admission: [address] [employer] Diagnosis: Metabolic encephalopathy  Diagnoses RELATED to the terminal prognosis: End-stage dementia  Other Diagnoses:      HOSPICE SUMMARY     Alex Bhatti is a 79y.o. year old who was admitted to [facility]. Patient is an 21-year-old male with essentially end-stage dementialikely Alzheimer's type who presents to the hospital with increasing agitation, confusion, delirium, encephalopathy. His wife states he really has not allowed her even to basic hygiene in the home setting. According to his wife, patient has had a fairly rapid decline over the last couple weeks with decreased p.o. intake, agitation, generalized weakness, aggressiveness at times. Work-up in the emergency room including CT scan of the head was unremarkable, UA was unremarkable, blood work to include TSH and ammonia levels were unremarkable. This appeared to be significant progression of his underlying dementia with metabolic encephalopathy. Patient has struggled taking any type of oral medication, not eating, climbing out of bed at times and this appears to be appropriate for GIP level care secondary to symptom burden. Family initially had investigated transition to a nursing home with hospice care but given symptom burden, we transitioned him to the community hospice house for GIP level care. Patient diagnosed with dementia approximately 5 years ago.   According to his wife, he used to be very calm, relatively laidback, wanted to be outdoors but that has completely changed over the last several months with being more restless, agitated and now will not go outside at all. She has had to be by his side 24/7 and also is completely exhausted being his caregiver    The patient's principle diagnosis has resulted in restlessness and agitation  Refer to LCD     Functionally, the patient's Karnofsky and/or Palliative Performance Scale has declined over a period of weeks to months and is estimated at 10-20. The patient is dependent on the following ADLs: All    Objective information that support this patients limited prognosis includes: See above note    The patient/family chose comfort measures with the support of Hospice. HOSPICE DIAGNOSES   Active Symptoms:  1. Metabolic encephalopathy  2. 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 Restlessness and agitation  4. Hospice care     PLAN   1. Patient admitted GIP [facility] care as he needs frequent nursing assessment, multiple adjustments in medication and overall needs are unknown at this time, and do anticipate a fairly rapid decline as he is not eating or drinking. 2. Comfort meds have been ordered on a as needed basis to start. We will need to evaluate over the next 12 to 24 hours overall needs and likely will need scheduled medication. I have given options to the nurses on both IV and sublingual medications. Explained to the family we will see what his overall needs are before scheduling medication. His wife is very clear that she wants to focus on his comfort. We discussed feeding at the end of life and it is not unusual for patients to stop eating and drinking. If he is awake enough and lucid enough, certainly we will offer especially fluids if he wants to partake in but we certainly will not force things. They completely understand  3. Plan reviewed with bedside nursing team  4.  Plan reviewed with patient's wife, daughter, friend of the daughter who is also present. Answered all questions and will provide ongoing education. Patient's wife cannot be thankful enough that he is at the hospice house    5.  and SW to support family needs  6. Disposition: To be determined but anticipate death at the hospice house  7. Hospice Plan of care was reviewed in detail and agree with current plan of care    Prognosis estimated based on 07/03/22 clinical assessment is:   [x] Hours to Days possibly  [] Days to Weeks    [] Other:    Communicated plan of care with: Hospice Case Manager; Hospice IDT; Care Team     GOALS OF CARE     Patient/Medical POA stated Goal of Care: Hospice care    [x] I have reviewed and/or updated ACP information in the Advance Care Planning Navigator. This information is available in the [address] link in the patient's chart header. Primary Decision Maker ([address]):   Primary Decision Maker (Active): Carolynn Lloydlotte - Spouse - [phone]    Resuscitation Status: DNR  If DNR is there a Durable DNR on file? : [x] Yes [] No (If no, complete Durable DNR)    HISTORY     History obtained from: Chart, spouse, hospice liaison    CHIEF COMPLAINT: Restlessness  The patient is:   [] Verbal  [x] Nonverbal  [] Unresponsive    HPI/SUBJECTIVE: Patient said a few words to [facility] nurse when he arrived. He is resting when I entered the room.        REVIEW OF SYSTEMS     The following systems were: [] reviewed  [x] unable to be reviewed    Positive ROS include:  Constitutional: fatigue, weakness, in pain, short of breath  Ears/nose/mouth/throat: increased airway secretions  Respiratory:shortness of breath, wheezing  Gastrointestinal:poor appetite, nausea, vomiting, abdominal pain, constipation, diarrhea  Musculoskeletal:pain, deformities, swelling legs  Neurologic:confusion, hallucinations, weakness  Psychiatric:anxiety, feeling depressed, poor sleep  Endocrine:     Adult Non-Verbal Pain Assessment Score: 4    Face  [] 0 No particular expression or smile  [x] 1   Occasional grimace, tearing, frowning, wrinkled forehead  [] 2   Frequent grimace, tearing, frowning, wrinkled forehead    Activity (movement)  [] 0   Lying quietly, normal position  [] 1   Seeking attention through movement or slow, cautious movement  [x] 2   Restless, excessive activity and/or withdrawal reflexes    Guarding  [x] 0   Lying quietly, no positioning of hands over areas of body  [] 1   Splinting areas of the body, tense  [] 2   Rigid, stiff    Physiology (vital signs)  [x] 0   Stable vital signs  [] 1   Change in any of the following: SBP > 20mm Hg; HR > 20/minute  [] 2   Change in any of the following: SBP > 30mm Hg; HR > 25/minute    Respiratory  [] 0   Baseline RR/SpO2, compliant with ventilator  [x] 1   RR > 10 above baseline, or 5% drop SpO2, mild asynchrony with ventilator  [] 2   RR > 20 above baseline, or 10% drop SpO2, asynchrony with ventilator     FUNCTIONAL ASSESSMENT     Palliative Performance Scale (PPS): 10-20       PSYCHOSOCIAL/SPIRITUAL ASSESSMENT     Active Problems:    * No active hospital problems.  *    Past Medical History:   Diagnosis Date    Skin cancer       Past Surgical History:   Procedure Laterality Date    HX MOHS PROCEDURES  03/27/2018    BCC R scaphoid fossa by Dr. Nasario Rubinstein History     Tobacco Use    Smoking status: Former Smoker    Smokeless tobacco: Never Used   Substance Use Topics    Alcohol use: Not on file     Family History   Problem Relation Age of Onset    Cancer Mother       No Known Allergies   Current Facility-Administered Medications   Medication Dose Route Frequency    bisacodyL (DULCOLAX) suppository 10 mg  10 mg Rectal DAILY PRN    haloperidol (HALDOL) 2 mg/mL oral solution 2 mg  2 mg SubLINGual Q4H PRN    LORazepam (INTENSOL) 2 mg/mL oral concentrate 1 mg  1 mg Oral Q1H PRN    morphine (ROXANOL) 100 mg/5 mL (20 mg/mL) concentrated solution 5 mg  5 mg Oral Q1H PRN    ziprasidone (GEODON) capsule 20 mg  20 mg Oral Q12H PRN    acetaminophen (TYLENOL) suppository 650 mg  650 mg Rectal Q6H PRN    LORazepam (ATIVAN) injection 1 mg  1 mg IntraVENous Q15MIN PRN    morphine injection 1 mg  1 mg IntraVENous Q15MIN PRN    haloperidol (HALDOL) 2 mg/mL oral solution 2 mg  2 mg Oral Q4H PRN        PHYSICAL EXAM     Wt Readings from Last 3 Encounters:   07/03/22 93.4 kg (206 lb)   06/28/22 93.4 kg (206 lb)   03/27/18 104.3 kg (230 lb)       Visit Vitals  /80   Pulse 87   Temp 97.7 °F (36.5 °C)   Resp 18   Ht 6' 1\" (1.854 m)   Wt 93.4 kg (206 lb)   SpO2 94%   BMI 27.18 kg/m²       Supplemental O2  [] Yes  [x] NO  Last bowel movement:     Currently this patient has:  [] Peripheral IV [] PICC  [] PORT [] ICD    [] Rubio Catheter [] NG Tube   [] PEG Tube    [] Rectal Tube [] Drain  [] Other:     Constitutional: Disheveled, a little bit restless, not interacting with me at this time  Eyes: Closed  ENMT: Slightly dry  Cardiovascular: Regular rate and rhythm  Respiratory: No respiratory distress, no accessory muscles  Gastrointestinal: Soft  Musculoskeletal: Muscle wasting  Skin: Unremarkable  Neurologic: Nonfocal  Psychiatric: Very restless at times  Other:       Pertinent Lab and or Imaging Tests:  Lab Results   Component Value Date/Time    Sodium 141 07/02/2022 04:25 AM    Potassium 3.2 (L) 07/02/2022 04:25 AM    Chloride 105 07/02/2022 04:25 AM    CO2 28 07/02/2022 04:25 AM    Anion gap 8 07/02/2022 04:25 AM    Glucose 126 (H) 07/02/2022 04:25 AM    BUN 13 07/02/2022 04:25 AM    Creatinine 0.58 (L) 07/02/2022 04:25 AM    BUN/Creatinine ratio 22 (H) 07/02/2022 04:25 AM    GFR est AA >60 07/02/2022 04:25 AM    GFR est non-AA >60 07/02/2022 04:25 AM    Calcium 8.6 07/02/2022 04:25 AM     Lab Results   Component Value Date/Time    Protein, total 6.5 06/23/2022 10:44 PM    Albumin 3.5 06/23/2022 10:44 PM           Total time:   Counseling / coordination time:   > 50% counseling / coordination?:

## 2022-07-03 NOTE — PROGRESS NOTES
1250: Patient arrived to [facility] via medical transport. Assisted into bed from stretcher. Patient speaks with his eyes closed most of the time, but does occasionally open his eyes and appears to track. Patient will speak and answer a few words and then closes his eyes and appears sleeping. 2 person skin check with Devin Toribio RN finds patient to have scattered bruising and small healing skin tears, but otherwise intact. Patient wearing a brief for incontinence, which is changed for urinary and bowel incontinence. Stool is loose. Patient grabs with turning and brief change, but does not appear to remain agitated when care is complete at this time. Patient states no when asked about pain, and said \"I'm fine, how are y'all doing? \" when asked a few minutes later. Patient resting in bed with eyes closed with music on in room. 1330: Patient's family at the bedside. Dr. Julio Mosquera at the bedside. Plan of care discussed, family in agreement. 1415: Patient resting quietly in bed. Family pleased to see him resting. They are appreciative of the quiet setting for patient  [phone] [id number]: Patient's family is leaving for the evening. Patient is sleeping, bed alarm on, siderails are up  32 61 16: Patient restless in bed, has voided outside of his brief. Appears agitated. PRN lorazepam 1mg SL administered. Incontinence care performed with Cheyenne Baker RN. Patient grabbing and not following commands with care. 1600: Patient has returned to resting with eyes closed. Resting quietly. Offered a drink and patient drank about 4 oz of pepsi through a straw without difficulty. 1700: Patient remains resting quietly with eyes closed. 1800: Patient appears sleeping. 1815: Spoke with patient's daughter, Genny Zelaya, updated on patient's afternoon.      1900: Report given to Olivia Rick

## 2022-07-03 NOTE — PROGRESS NOTES
Bedside and Verbal shift change report given to [facility] (oncoming nurse) by Toni Borja (offgoing nurse). Report included the following information SBAR, Kardex, ED Summary, Procedure Summary, Intake/Output, MAR and Recent Results.

## 2022-07-03 NOTE — PROGRESS NOTES
[facility]  [phone]    Inpatient Nursing Admission   Patient Name: Cristiano Cook  YOB: 1940  Age: 79 y.o. Date of Hospice Admission: 7/3/2022  Hospice Attending Elected by Patient: Jose Sierra MD  Primary Care Physician: Judah Hernandes MD  Admitting RN: Esequiel Glover RN   :     Level of Care (GIP/Routine/Respite):[facility]  Facility of Care: [facility]  Patient Room: 08/01     HOSPICE SUMMARY   ER Visits/ Hospitalizations in past year:   Hospice Diagnosis: Acute Metabolic Encephalopathy  Onset Date of Hospice Diagnosis: 5 years  Summary of Disease Progression Leading to Hospice Diagnosis: Per Dr [place]: [facility] male with essentially end-stage dementialikely Alzheimer's type who presents to the hospital with increasing agitation, confusion, delirium, encephalopathy. His wife states he really has not allowed her even to basic hygiene in the home setting. According to his wife, patient has had a fairly rapid decline over the last couple weeks with decreased p.o. intake, agitation, generalized weakness, aggressiveness at times. Work-up in the emergency room including CT scan of the head was unremarkable, UA was unremarkable, blood work to include TSH and ammonia levels were unremarkable. This appeared to be significant progression of his underlying dementia with metabolic encephalopathy. Patient has struggled taking any type of oral medication, not eating, climbing out of bed at times and this appears to be appropriate for [facility] level care secondary to symptom burden. Family initially had investigated transition to a nursing home with hospice care but given symptom burden, we transitioned him to the community hospice house for [facility] level care.      Diagnoses RELATED to the terminal prognosis: End Stage Dementia  Other Diagnoses:   Patient Active Problem List   Diagnosis Code    Dementia ([facility] [address] F03.90    HTN (hypertension), benign I10 Rationale for a prognosis of life expectancy of 6 months or less if the disease follows its normal course (Disease Specific History):     Patricia Osei is a 79 y.o. who was admitted to [address] [place] [address]. The patient's principle diagnosis of Metabolic Encephalopathy has resulted in agitation, restlessness. Functionally, the patient's Palliative Performance Scale has declined over a period of 4-6 weeks and is estimated at20. Objective information that support this patients limited prognosis includes:  Lab Results   Component Value Date/Time     Sodium 141 07/02/2022 04:25 AM     Potassium 3.2 (L) 07/02/2022 04:25 AM     Chloride 105 07/02/2022 04:25 AM     CO2 28 07/02/2022 04:25 AM     Anion gap 8 07/02/2022 04:25 AM     Glucose 126 (H) 07/02/2022 04:25 AM     BUN 13 07/02/2022 04:25 AM     Creatinine 0.58 (L) 07/02/2022 04:25 AM     BUN/Creatinine ratio 22 (H) 07/02/2022 04:25 AM     GFR est AA >60 07/02/2022 04:25 AM     GFR est non-AA >60 07/02/2022 04:25 AM     Calcium 8.6 07/02/2022 04:25 AM            Lab Results   Component Value Date/Time     Protein, total 6.5 06/23/2022 10:44 PM     Albumin 3.5 06/23/2022 10:44 PM       The patient/family chose comfort measures with the support of Hospice. Patient meets for GIP LOC as evidenced by frequent nursing assessment, titration of medications. Prognosis estimated based on 07/03/22 clinical assessment is:   [] Few to Many Hours  [] Hours to Days   [] Few to Many Days   [x] Days to Weeks   [] Few to Many Weeks   [] Weeks to Months   [] Few to Many Months    ASSESSMENT    Patient self-reports:  []  Yes    [x] No    SYMPTOMS: Agitation, restlessness? SIGNS/PHYSICAL FINDINGS: Patient agitated with any physical contact, grabbing. Restless in bed, throwing off clothes and bed linens. Poor safety awareness     FAST for all dementia:  6e    Learning Assessment:  Patient  Is patient willing/able to learn?  No  What is the highest level of education completed? Learning preference (written material, demonstration, visual)? Learning barriers (ESOL, [place], poor vision)? Caregiver  Is caregiver willing to learn care for patient? YEs  What is the highest level of education completed? Learning preference (written material, demonstration, visual)? Listening  Learning barriers (ESOL, [place], poor vision)? CLINICAL INFORMATION     Wt Readings from Last 3 Encounters:   07/03/22 93.4 kg (206 lb)   06/28/22 93.4 kg (206 lb)   03/27/18 104.3 kg (230 lb)     Ht Readings from Last 3 Encounters:   07/03/22 6' 1\" (1.854 m)   06/29/22 6' 1\" (1.854 m)   03/27/18 6' 1\" (1.854 m)     Body mass index is 27.18 kg/m². Visit Vitals  /80   Pulse 87   Temp 97.7 °F (36.5 °C)   Resp 18   Ht 6' 1\" (1.854 m)   Wt 93.4 kg (206 lb)   SpO2 94%   BMI 27.18 kg/m²       LAB VALUES  No results found for this visit on 07/03/22 (from the past 12 hour(s)). No results found for this visit on 07/03/22 (from the past 6 hour(s)). Lab Results   Component Value Date/Time    Protein, total 6.5 06/23/2022 10:44 PM    Albumin 3.5 06/23/2022 10:44 PM       Currently this patient has:  [] Supplemental O2 [] Peripheral IV  [] PICC    [] PORT   [] Rubio Catheter [] NG Tube   [] PEG Tube [] Ostomy    [] AICD: Has ICD been deactivated? [] Yes [] No:______    PLAN     Admit to [facility] level of care for symptom mangement of agitation and restlessness  2. lorazepam  1mg SL q 1 hour PRN agiation/anxiety/restlessness  3. Haloperidol 2mg SL q 4 hours PRN agitation   4. PRN medications in place for breakthrough symptom management  5. Infection control and prevention as needed   6. Provide support/education to caregiver/family-Harriet Bajwa, wife  7. Monitor closely for changes in symptoms  8. Provide support and frequent rounds for patient comfort and safety   9. Maintain skin integrity as tolerated for hospice patient, turning and repositioning for comfort  10.  Provide  and  for patient and family support  6. Continue to discuss discharge plan for any changes    Hospice Team Frequency Orders:  Skilled Nurse - Daily x 14 days with 5 PRN visits for symptom control. MSW  1 x weekly with 5 visits PRN family support and need for volunteer services.   1 x weekly with 5 visits PRN spiritual support. CNA  daily x 14 days    ADVANCE CARE PLANNING (Complete in ACP Flow Sheet)   Code Status: DNR  Durable DNR: [x]  Yes  []  No  Code Status Discussed/Confirmed: YES  Preference for Other Life Sustaining Treatment Discussed/Confirmed: yes  Hospitalization Preference:  yes  Advance Care Planning 2022   Patient's Healthcare Decision Maker is: Legal Next of [address] [place] [address] Service: [] Yes  []  No      [] Unknown  Appropriate for Pinning Ceremony:  [] Yes     [] No  Sikh: Yazdanism   Home: TBD    DISCHARGE PLANNING     1. Discharge Plan: Placement (  2. Patient/Family teaching: [facility] orientation, mediations, safety   3. Response to patient/family teaching: Verbalized understanding    SOCIAL/EMOTIONAL/SPIRITUAL NEEDS     Spiritual Issues Identified: None identified    Psych/ Social/ Emotional Issues Identified: None identified    Caregiver Support:  [x] Provided information on End of Life Care   [] Material Provided: [employer] or [employer]   Dr. Tai Houser contacted, discharge to hospice order received  Dr. Tai Houser contacted, agrees to serve as attending provider for hospice and provided verbal certification of terminal illness with life expectancy of 6 months or less. Orders for hospice admission, medications and plan of treatment received. Medication reconciliation completed.   MEDS: See medication list below  DME: Per [facility]  Supplies: Per [facility]  IDT communication to include MD, SN, SW, CH and support team    ALLERGIES AND MEDICATIONS     Allergies: No Known Allergies  Current Facility-Administered Medications   Medication Dose Route Frequency    bisacodyL (DULCOLAX) suppository 10 mg  10 mg Rectal DAILY PRN    haloperidol (HALDOL) 2 mg/mL oral solution 2 mg  2 mg SubLINGual Q4H PRN    LORazepam (INTENSOL) 2 mg/mL oral concentrate 1 mg  1 mg Oral Q1H PRN    morphine (ROXANOL) 100 mg/5 mL (20 mg/mL) concentrated solution 5 mg  5 mg Oral Q1H PRN    ziprasidone (GEODON) capsule 20 mg  20 mg Oral Q12H PRN    acetaminophen (TYLENOL) suppository 650 mg  650 mg Rectal Q6H PRN    LORazepam (ATIVAN) injection 1 mg  1 mg IntraVENous Q15MIN PRN    morphine injection 1 mg  1 mg IntraVENous Q15MIN PRN    haloperidol (HALDOL) 2 mg/mL oral solution 2 mg  2 mg Oral Q4H PRN

## 2022-07-03 NOTE — PROGRESS NOTES
Problem: Pressure Injury - Risk of  Goal: *Prevention of pressure injury  Description: Document Blaise Scale and appropriate interventions in the flowsheet. Outcome: Progressing Towards Goal  Note: Pressure Injury Interventions:  Sensory Interventions: Float heels,Minimize linen layers,Turn and reposition approx. every two hours (pillows and wedges if needed),Assess need for specialty bed,Check visual cues for pain    Moisture Interventions: Absorbent underpads,Minimize layers,Limit adult briefs,Moisture barrier    Activity Interventions: Assess need for specialty bed    Mobility Interventions: Assess need for specialty bed,Float heels,Turn and reposition approx. every two hours(pillow and wedges)    Nutrition Interventions: Document food/fluid/supplement intake    Friction and Shear Interventions: Apply protective barrier, creams and emollients,Lift sheet,Transferring/repositioning devices                Problem: Patient Education: Go to Patient Education Activity  Goal: Patient/Family Education  Outcome: Progressing Towards Goal     Problem: Falls - Risk of  Goal: *Absence of Falls  Description: Document Ayse Patel Fall Risk and appropriate interventions in the flowsheet.   Outcome: Progressing Towards Goal  Note: Fall Risk Interventions:  Mobility Interventions: Bed/chair exit alarm    Mentation Interventions: Adequate sleep, hydration, pain control,Bed/chair exit alarm,Door open when patient unattended    Medication Interventions: Bed/chair exit alarm    Elimination Interventions: Call light in reach,Bed/chair exit alarm              Problem: Patient Education: Go to Patient Education Activity  Goal: Patient/Family Education  Outcome: Progressing Towards Goal     Problem: Hospice Orientation  Goal: Demonstrate understanding of hospice philosophy, plan of care, and home hospice program  Description: The patient/family/caregiver will demonstrate understanding of hospice philosophy, plan of care and the home hospice program as evidenced by participation in meeting the patient's psychosocial, spiritual, medical, and physical needs inclusive of medical supplies/equipment focusing on symptoms. Outcome: Progressing Towards Goal     Problem: Potential for Alteration in Skin Integrity  Goal: Monitor skin for areas of alteration in skin integrity  Description: Patient/family/caregiver will demonstrate ability to care for patient's skin, monitor for areas of breakdown, and demonstrate methods to prevent breakdown during hospice care. Outcome: Progressing Towards Goal     Problem: Risk for Falls  Goal: Free of falls during inpatient stay  Description: Patient will be free of falls during inpatient stay. Outcome: Progressing Towards Goal     Problem: Alteration in Mobility  Goal: Remain as independent as possible and remain safe in environment  Description: Patient will remain as independent as possible and remain safe in their environment. Outcome: Progressing Towards Goal     Problem: Pain  Goal: Assess satisfaction of level of comfort and symptom control  Outcome: Progressing Towards Goal  Goal: *Control of acute pain  Outcome: Progressing Towards Goal     Problem: Anticipatory Grief  Goal: Explore reactions to and verbalize acceptance of impending loss  Description: Patient/family/caregiver will explore reactions to and verbalize acceptance of impending loss. Outcome: Progressing Towards Goal     Problem: Anxiety/Agitation  Goal: Verbalize or staff assess the ability to manage anxiety  Description: The patient/family/caregiver will verbalize and demonstrate ability to manage the patient's anxiety throughout hospice care. Outcome: Progressing Towards Goal     Problem: Communication Deficit  Goal: Effectively communicate symptoms, needs, and concerns  Description: Patient/family/caregiver will effectively communicate symptoms, needs and concerns.   Outcome: Progressing Towards Goal     Problem: Pressure Injury - Risk of  Goal: *Prevention of pressure injury  Outcome: Progressing Towards Goal  Note: Pressure Injury Interventions:  Sensory Interventions: Float heels,Minimize linen layers,Turn and reposition approx. every two hours (pillows and wedges if needed),Assess need for specialty bed,Check visual cues for pain    Moisture Interventions: Absorbent underpads,Minimize layers,Limit adult briefs,Moisture barrier    Activity Interventions: Assess need for specialty bed    Mobility Interventions: Assess need for specialty bed,Float heels,Turn and reposition approx.  every two hours(pillow and wedges)    Nutrition Interventions: Document food/fluid/supplement intake    Friction and Shear Interventions: Apply protective barrier, creams and emollients,Lift sheet,Transferring/repositioning devices

## 2022-07-03 NOTE — DISCHARGE SUMMARY
Wilfredo Kuhn jennifer [address]  [address]  Tel: [phone]    Hospital Medicine Discharge Summary    Patient ID:    Kindra Lisa  Age:              79 y.o.    : 1940  MRN:             [id number]     PCP: Marcelina Kang MD     Date of Admission: 2022    Date of Discharge:  7/3/2022    Principal admission Diagnosis:   Agitation [R45.1]    Discharge Diagnoses: Active Problems:    Dementia ([address] (2022)    HTN (hypertension), benign (2022)    Hospital Course:     Mr. Valeria Baxter is a 79 y.o. admitted to [facility] and treated for the following:    Dementia ([address] (2022) POA: with delirium and likely has progression and worsening of disease. Has intermittent restlessness that led to a fall last night. No apparent acute injuries. CT scan head neg. UA unremarkable. TSH, ammonia levels normal. Seen by hospice and he does not meet criteria for GIP admission. Being discharged to the [facility] for further hospice care       HTN (hypertension), benign (2022) / Hyperlipidemia POA: BP stable. Given his advanced dementia, stopped all his medications. Discharge Exam:    Visit Vitals  /87 (BP 1 Location: Left leg, BP Patient Position: Lying)   Pulse 82   Temp 97.9 °F (36.6 °C)   Resp 18   Ht 6' 1\" (1.854 m)   Wt 93.4 kg (206 lb)   SpO2 93%   BMI 27.18 kg/m²        Patient has been seen and examined. General: weak looking, resting comfortably and not in distress   Pulm: clear breath sounds without wheezes  Card: no murmurs, normal S1, S2 without thrills, bruits   Abd:    soft, non-tender, normoactive bowel sounds    Activity: Activity as tolerated    Diet: Comfort feeding    Current Discharge Medication List      CONTINUE these medications which have NOT CHANGED    Details   memantine (Namenda) 10 mg tablet Take 10 mg by mouth two (2) times a day. STOP taking these medications       risperiDONE (RisperDAL) 0.5 mg tablet Comments:   Reason for Stopping:         carvedilol (COREG) 12.5 mg tablet Comments:   Reason for Stopping:         losartan (COZAAR) 25 mg tablet Comments:   Reason for Stopping:         lovastatin (MEVACOR) 20 mg tablet Comments:   Reason for Stopping: Follow-up Information    None         Follow-up tests or labs: None    Discharge Condition: Stable    Disposition: [address]    Time taken to co-ordinate and arrange discharge:  35 minutes.     Signed:  Ana Elias MD       7/3/2022   9:19 AM

## 2022-07-03 NOTE — PROGRESS NOTES
Problem: Pressure Injury - Risk of  Goal: *Prevention of pressure injury  Description: Document Blaise Scale and appropriate interventions in the flowsheet. Outcome: Progressing Towards Goal  Note: Pressure Injury Interventions:  Sensory Interventions: Float heels    Moisture Interventions: Absorbent underpads    Activity Interventions: Assess need for specialty bed    Mobility Interventions: Assess need for specialty bed    Nutrition Interventions: Document food/fluid/supplement intake    Friction and Shear Interventions: Apply protective barrier, creams and emollients                Problem: Patient Education: Go to Patient Education Activity  Goal: Patient/Family Education  Outcome: Progressing Towards Goal     Problem: Falls - Risk of  Goal: *Absence of Falls  Description: Document Bobby Fall Risk and appropriate interventions in the flowsheet. Outcome: Progressing Towards Goal  Note: Fall Risk Interventions:  Mobility Interventions: Bed/chair exit alarm    Mentation Interventions: Adequate sleep, hydration, pain control    Medication Interventions: Bed/chair exit alarm    Elimination Interventions: Call light in reach    History of Falls Interventions: Bed/chair exit alarm         Problem: Hospice Orientation  Goal: Demonstrate understanding of hospice philosophy, plan of care, and home hospice program  Description: The patient/family/caregiver will demonstrate understanding of hospice philosophy, plan of care and the home hospice program as evidenced by participation in meeting the patient's psychosocial, spiritual, medical, and physical needs inclusive of medical supplies/equipment focusing on symptoms.   Outcome: Progressing Towards Goal     Problem: Potential for Alteration in Skin Integrity  Goal: Monitor skin for areas of alteration in skin integrity  Description: Patient/family/caregiver will demonstrate ability to care for patient's skin, monitor for areas of breakdown, and demonstrate methods to prevent breakdown during hospice care. Outcome: Progressing Towards Goal     Problem: Risk for Falls  Goal: Free of falls during inpatient stay  Description: Patient will be free of falls during inpatient stay. Outcome: Progressing Towards Goal     Problem: Alteration in Mobility  Goal: Remain as independent as possible and remain safe in environment  Description: Patient will remain as independent as possible and remain safe in their environment. Outcome: Progressing Towards Goal     Problem: Pain  Goal: Assess satisfaction of level of comfort and symptom control  Outcome: Progressing Towards Goal  Goal: *Control of acute pain  Outcome: Progressing Towards Goal     Problem: Anticipatory Grief  Goal: Explore reactions to and verbalize acceptance of impending loss  Description: Patient/family/caregiver will explore reactions to and verbalize acceptance of impending loss. Outcome: Progressing Towards Goal     Problem: Anxiety/Agitation  Goal: Verbalize or staff assess the ability to manage anxiety  Description: The patient/family/caregiver will verbalize and demonstrate ability to manage the patient's anxiety throughout hospice care. Outcome: Progressing Towards Goal     Problem: Communication Deficit  Goal: Effectively communicate symptoms, needs, and concerns  Description: Patient/family/caregiver will effectively communicate symptoms, needs and concerns.   Outcome: Progressing Towards Goal     Problem: Pressure Injury - Risk of  Goal: *Prevention of pressure injury  Outcome: Progressing Towards Goal

## 2022-07-03 NOTE — PROGRESS NOTES
1900 Report from [facility] Assessment completed. Patient lethargic and oriented only to self. Patient resting with eyes closed and no facial grimacing. 1950 Patient has persistent hiccups, PRN Haldol given. 2010 Patient resting in bed with unlabored respirations. 2042 Patient kicking blankets off and restless in bed. PRN sublingual Lorazepam given. 2050 Patient bathed and turned on L side with Paco CNA. 2143 Patient still restless- moving around in bed with legs in the air. PRN sublingual  Lorazepam given. 2210 Patient resting in bed with neutral facial expression. 2250 Patient asleep in bed with unlabored respirations. 2340 Patient restless in bed with legs in the air kicking. PRN sublingual Lorazepam given. Will attempt IV when patient is calmer. Patient pulling at brief, and will not keep gown on.    0015 Patient still kicking legs and pulling off brief. Patient swatting and grabbing at CNA and RN's hands/arms and squeezing tightly. PRN haldol given and brief changed. 0030 Patient still combative, restless, and has facial grimacing. Patient has legs in the air, grabbing at his arms and legs, and swatting at staff. Subcutaneous line placed and IV Ativan and Morphine given. 0040 Call placed to on call MD. No answer, left voicemail. 0050 Patient still restless and in discomfort. Patient grabbing at arms and legs and has facial grimacing. Patient swatting hands at staff. PRN IV Ativan and Morphine given. 0105 Second call placed to on call MD. No answer, message left for call back. 3736 Patient still moving around in bed and hands swatting at staff. Facial grimacing present and rubbing arms. PRN IV Ativan and Morphine given subcutaneously. Will get IV when patient is calmer. 0130 Patient starting to calm down but still tense with facial grimacing, IV placed in left AC.  PRN IV Ativan and Morphine given through IV.    0145 Patient asleep with relaxed facial expression. 0215 Patient found to be moving extremities in bed with facial grimace. PRN IV Morphine and Ativan given. 0230 Patient still grabbing at sheet, moving extremities and has facial grimace. PRN Morphine and Ativan given. 0240 Call placed to on call MD. New orders for 20mg IM Geodon, 2mg IV Ativan Q2H and every 15 mins prn, 4mg Morphine Q2H and every 15 mins prn.     [facility] pharmacy to have medications verified. They said they will verify as soon as possible. 8129 Medications verified. Patient still has furrowed brow, restless extremities in bed, and attempting to pull out IV. Patient given PRN IM Geodon, Ativan 2mg IV, and Morphine 4mg IV.    3618 Patient resting with eyes closed and no facial grimacing.    0400 Scheduled IV medications given. Patient is minimally responsive with neutral facial expression. 0500 Patient resting with eyes closed and unlabored respirations. 0600 Scheduled IV medication given. Patient resting with eyes closed and relaxed facial expression. 4226 Attempted to call wife to update her on patient's night and medication changes. No answer, voicemail left. 0630 Patient turned on R side. Patient did not respond or open eyes when turned. 0700 Report to oncoming nurse.          NAME OF PATIENT:  Julianna Odonnell    LEVEL OF CARE:  [facility]    REASON FOR GIP:   Terminal agitation, despite changes to medications, Medication adjustment that must be monitored 24/7 and Stabilizing treatment that cannot take place at home,  Pain, restlessness    *PATIENT REMAINS ELIGIBLE FOR GIP LEVEL OF CARE AS EVIDENCED BY: frequent nursing assessment and PRN medications required, IV medications required      REASON FOR RESPITE:  n/a    O2 SAFETY:  n/a    FALL INTERVENTIONS PROVIDED:   Implemented/recommended use of non-skid footwear, Implemented/recommended use of fall risk identification flag to all team members, Implemented/recommended resources for alarm system (personal alarm, bed alarm, call bell, etc.) , Implemented/recommended environmental changes (remove hazards, lower bed, improve lighting, etc.) and Implemented/recommended increased supervision/assistance    INTERDISPLINARY COMMUNICATION/COLLABORATION:  Physician, MSW, Dave and RN, CNA    NEW MEDICATION INITIATION DOCUMENTATION:  Consulted AT MD to report change in pt status, Obtained Order from Provider for initiation of symptom relief medication /other medication needed and Documentation completed in Clinical Note in [facility]    Reason medication is being initiated:  Pain and terminal agitation    MD / Provider name consulted re: change in status / initiation of new medication:  Dr. Pelon Weller Symptom(s):  Increased pain, restlessness and agitation    New Order(s):  20mg IM Geodon Q12H PRN, 2mg IV Ativan Q2H and every 15 mins prn, 4mg Morphine IV Q2H and every 15 mins prn. Name of the person notified of the changes: Will call wife in AM    Name of person being taught:  Altagracia Crockett    Instructions given:  yes    Side Effects taught:  yes    Response to teaching:  thankful      COMFORTABLE DYING MEASURE:  Is Patient/family satisfied with symptom level?  yes    DISCHARGE PLAN:  Home when symptoms are managed.

## 2022-07-03 NOTE — PROGRESS NOTES
1100: [facility] and gave verbal report to nurse. Report included SBAR, recent results and vital signs, MAR, and intake and output. 1154: Verbal report given to [facility] transport including SBAR, recent vitals, and intake and output.

## 2022-07-04 NOTE — PROGRESS NOTES
Problem: Pressure Injury - Risk of  Goal: *Prevention of pressure injury  Description: Document Blaise Scale and appropriate interventions in the flowsheet. Outcome: Progressing Towards Goal  Note: Pressure Injury Interventions:  Sensory Interventions: Assess changes in LOC,Float heels,Check visual cues for pain,Keep linens dry and wrinkle-free,Minimize linen layers,Monitor skin under medical devices,Pressure redistribution bed/mattress (bed type)    Moisture Interventions: Maintain skin hydration (lotion/cream),Internal/External urinary devices,Moisture barrier,Limit adult briefs,Minimize layers,Apply protective barrier, creams and emollients    Activity Interventions: Pressure redistribution bed/mattress(bed type)    Mobility Interventions: Float heels,Pressure redistribution bed/mattress (bed type)    Nutrition Interventions: Offer support with meals,snacks and hydration    Friction and Shear Interventions: Lift sheet,Minimize layers                Problem: Patient Education: Go to Patient Education Activity  Goal: Patient/Family Education  Outcome: Progressing Towards Goal     Problem: Falls - Risk of  Goal: *Absence of Falls  Description: Document Bobby Fall Risk and appropriate interventions in the flowsheet.   Outcome: Progressing Towards Goal  Note: Fall Risk Interventions:  Mobility Interventions: Bed/chair exit alarm,Communicate number of staff needed for ambulation/transfer    Mentation Interventions: Adequate sleep, hydration, pain control,Bed/chair exit alarm,Door open when patient unattended    Medication Interventions: Bed/chair exit alarm    Elimination Interventions: Bed/chair exit alarm,Call light in reach    History of Falls Interventions: Bed/chair exit alarm,Door open when patient unattended         Problem: Patient Education: Go to Patient Education Activity  Goal: Patient/Family Education  Outcome: Progressing Towards Goal     Problem: Hospice Orientation  Goal: Demonstrate understanding of hospice philosophy, plan of care, and home hospice program  Description: The patient/family/caregiver will demonstrate understanding of hospice philosophy, plan of care and the home hospice program as evidenced by participation in meeting the patient's psychosocial, spiritual, medical, and physical needs inclusive of medical supplies/equipment focusing on symptoms. Outcome: Progressing Towards Goal     Problem: Potential for Alteration in Skin Integrity  Goal: Monitor skin for areas of alteration in skin integrity  Description: Patient/family/caregiver will demonstrate ability to care for patient's skin, monitor for areas of breakdown, and demonstrate methods to prevent breakdown during hospice care. Outcome: Progressing Towards Goal     Problem: Risk for Falls  Goal: Free of falls during inpatient stay  Description: Patient will be free of falls during inpatient stay. Outcome: Progressing Towards Goal     Problem: Alteration in Mobility  Goal: Remain as independent as possible and remain safe in environment  Description: Patient will remain as independent as possible and remain safe in their environment. Outcome: Progressing Towards Goal     Problem: Pain  Goal: Assess satisfaction of level of comfort and symptom control  Outcome: Progressing Towards Goal  Goal: *Control of acute pain  Outcome: Progressing Towards Goal     Problem: Anticipatory Grief  Goal: Explore reactions to and verbalize acceptance of impending loss  Description: Patient/family/caregiver will explore reactions to and verbalize acceptance of impending loss. Outcome: Progressing Towards Goal     Problem: Anxiety/Agitation  Goal: Verbalize or staff assess the ability to manage anxiety  Description: The patient/family/caregiver will verbalize and demonstrate ability to manage the patient's anxiety throughout hospice care.   Outcome: Progressing Towards Goal     Problem: Communication Deficit  Goal: Effectively communicate symptoms, needs, and concerns  Description: Patient/family/caregiver will effectively communicate symptoms, needs and concerns.   Outcome: Progressing Towards Goal     Problem: Pressure Injury - Risk of  Goal: *Prevention of pressure injury  Outcome: Progressing Towards Goal  Note: Pressure Injury Interventions:  Sensory Interventions: Assess changes in LOC,Float heels,Check visual cues for pain,Keep linens dry and wrinkle-free,Minimize linen layers,Monitor skin under medical devices,Pressure redistribution bed/mattress (bed type)    Moisture Interventions: Maintain skin hydration (lotion/cream),Internal/External urinary devices,Moisture barrier,Limit adult briefs,Minimize layers,Apply protective barrier, creams and emollients    Activity Interventions: Pressure redistribution bed/mattress(bed type)    Mobility Interventions: Float heels,Pressure redistribution bed/mattress (bed type)    Nutrition Interventions: Offer support with meals,snacks and hydration    Friction and Shear Interventions: Lift sheet,Minimize layers

## 2022-07-04 NOTE — PROGRESS NOTES
0700: Report received from [place], [facility]. Patient is resting quietly in bed, appears sleeping  0715: Patient assessment complete. Documented in flowsheets. Patient is not responsive during assessment. Neutral facial position observed. Respirations are regular depth and rhythm. 4265: Scheduled morphine 4mg and lorazepam 2mg IV administered. Patient resting quietly with eyes closed, appears sleeping  0900: Patient resting with eyes closed, not responsive at this time  1000: Patient remains resting with eyes closed, not responsive. 1030: Patient's respirations are 6/min and patient is not responsive. Order obtained to discontinue scheduled morphine, will treat any pain as needed with PRN morphine. 1130: Patient remains sleeping. Patient's hair shampooed, unresponsive during care. 1215: Patient's wife and other family at the bedside. Patient remains unresponsive during care. 1300: Patient remains sleeping, unresponsive. Family is at the bedside. They expressed appreciation for the care we are giving.   1400: Scheduled lorazepam 2mg IV administered. Patient remains unresponsive. 1500: Patient is resting quietly with eyes closed, appears sleeping. Neutral facial position observed. Respirations are regular depth at rate of 7/min  1530: Patient's family leaving for the evening. 1600: Patient is resting quietly with eyes closed, appears sleeping. 1700: Scheduled lorazepam administered  Patient repositioned with pillows for offloading. Patient remains unresponsive. 1745: Patient's daughter, Brant Ahumada, called. She is asking about prognosis, she has a brother and sister who live out of state and is concerned that they should come see patient sooner than later. Suggested they come visit.   1840: Scheduled lorazepam 2mg administered  1900: Report given to Rashaun Ahn RN                                     NAME OF PATIENT:  Gerardo Ram    LEVEL OF CARE:  GIP    REASON FOR GIP:   Terminal agitation, despite changes to medications, Medication adjustment that must be monitored 24/7 and Stabilizing treatment that cannot take place at home    *PATIENT REMAINS ELIGIBLE FOR [facility] LEVEL OF CARE AS EVIDENCED BY: (MUST BE ADDRESSED OF PATIENT GIP)      REASON FOR RESPITE:  NA    O2 SAFETY:  NA    FALL INTERVENTIONS PROVIDED:   Implemented/recommended use of non-skid footwear, Implemented/recommended use of fall risk identification flag to all team members, Implemented/recommended assistive devices and encouraged their use and Implemented/recommended increased supervision/assistance    INTERDISPLINARY COMMUNICATION/COLLABORATION:  Physician, MSW, Dave and RN, CNA    NEW MEDICATION INITIATION DOCUMENTATION:  Documentation completed in Clinical Note in [address] [place] [address]    Reason medication is being initiated:  TIFFANIE    MD / Provider name consulted re: change in status / initiation of new medication:  Dr Mejia Person Symptom(s):  NA    New Order(s):  Discontinue scheduled morphine    Name of the person notified of the changes:  N    Name of person being taught:  Jasmin Umanzor    Instructions given:  yes    Side Effects taught:  yes    Response to teaching:  Verbalized understanding      COMFORTABLE DYING MEASURE:  Is Patient/family satisfied with symptom level?  yes    DISCHARGE PLAN:  Home

## 2022-07-04 NOTE — PROGRESS NOTES
1900 Report received from [place], [facility] Patient assessment performed as documented in flow sheet. Patient admitted 7/3/22 [facility] with hospice diagnosis of metabolic encephalopathy and end stage Alzheimer's. Patient's heart rate is irregular with auscultation and palpation of radial pulse. Patient appears in no apparent distress. 2000 Scheduled Lorazepam administered. Mouth care provided. 2100 Patient resting with eyes closed. Respirations are even and unlabored. 2200 Scheduled Lorazepam administered. 2300 Patient resting quietly. No signs of pain or discomfort noted. 2353 Scheduled Lorazepam administered by Nidhi Bustamante [address] [place] [address]  Patient resting quietly with relaxed facial expression. 5017 Patient has been groaning occasionally, but the frequency has increased over the past hour and the patient has a furrowed brow. PRN Morphine administered. Will continue to monitor. 0215 Scheduled Lorazepam administered. Patient has made no moaning sounds since PRN Morphine was administered. 7528 Patient resting quietly with eyes closed and relaxed facial expression. 0400 Scheduled Lorazepam administered. 0500 Patient resting quietly with neutral facial expression. 0600 Patient bathed by YESSICA Salcedo. Patient had bowel movement and urinated. Patient repositioned.  0606 Scheduled Lorazepam administered. [address] to patient's wife and update given. She states that she will likely come in this afternoon to visit. She also mentioned some family members may come visit today. NAME OF PATIENT:  Sushil Domínguez    LEVEL OF CARE:  [facility]    REASON FOR GIP:   Medication adjustment that must be monitored 24/7 and Stabilizing treatment that cannot take place at home    *PATIENT REMAINS ELIGIBLE FOR [facility] LEVEL OF CARE AS EVIDENCED BY: (MUST BE ADDRESSED OF PATIENT GIP)  Patient requires frequent nursing assessments as well as scheduled and PRN IV medication for symptom management.       REASON FOR RESPITE:  N/A    O2 SAFETY:  N/A patient on room air. FALL INTERVENTIONS PROVIDED:   Implemented/recommended resources for alarm system (personal alarm, bed alarm, call bell, etc.) , Implemented/recommended environmental changes (remove hazards, lower bed, improve lighting, etc.) and Implemented/recommended increased supervision/assistance    INTERDISPLINARY COMMUNICATION/COLLABORATION:  Physician, MSW, [place] and RN, CNA    NEW MEDICATION INITIATION DOCUMENTATION:  N/A    Reason medication is being initiated:  N/A    MD / Provider name consulted re: change in status / initiation of new medication:  N/A    New Symptom(s):  N/A    New Order(s):  N/A    Name of the person notified of the changes:  N/A    Name of person being taught:  N/A    Instructions given:  N/A    Side Effects taught:  N/A    Response to teaching:  N/A      COMFORTABLE DYING MEASURE:  Is Patient/family satisfied with symptom level?  yes    DISCHARGE PLAN:  Home vs EOL.

## 2022-07-05 NOTE — PROGRESS NOTES
Problem: Pressure Injury - Risk of  Goal: *Prevention of pressure injury  Description: Document Blaise Scale and appropriate interventions in the flowsheet. Outcome: Progressing Towards Goal  Note: Pressure Injury Interventions:  Sensory Interventions: Turn and reposition approx. every two hours (pillows and wedges if needed),Pad between skin to skin,Monitor skin under medical devices,Minimize linen layers,Keep linens dry and wrinkle-free,Float heels    Moisture Interventions: Moisture barrier,Maintain skin hydration (lotion/cream),Check for incontinence Q2 hours and as needed,Apply protective barrier, creams and emollients,Absorbent underpads    Activity Interventions: Pressure redistribution bed/mattress(bed type)    Mobility Interventions: Float heels,HOB 30 degrees or less,Pressure redistribution bed/mattress (bed type)    Nutrition Interventions:  (pt unrepsonsive npo)    Friction and Shear Interventions: Feet elevated on foot rest,Apply protective barrier, creams and emollients,HOB 30 degrees or less,Transferring/repositioning devices                Problem: Patient Education: Go to Patient Education Activity  Goal: Patient/Family Education  Outcome: Progressing Towards Goal     Problem: Falls - Risk of  Goal: *Absence of Falls  Description: Document Bobby Fall Risk and appropriate interventions in the flowsheet.   Outcome: Progressing Towards Goal  Note: Fall Risk Interventions:  Mobility Interventions: Bed/chair exit alarm    Mentation Interventions: Adequate sleep, hydration, pain control,Bed/chair exit alarm,Door open when patient unattended,More frequent rounding,Room close to nurse's station    Medication Interventions: Bed/chair exit alarm    Elimination Interventions: Bed/chair exit alarm,Call light in reach    History of Falls Interventions: Bed/chair exit alarm,Door open when patient unattended,Room close to nurse's station         Problem: Patient Education: Go to Patient Education Activity  Goal: Patient/Family Education  Outcome: Progressing Towards Goal     Problem: Hospice Orientation  Goal: Demonstrate understanding of hospice philosophy, plan of care, and home hospice program  Description: The patient/family/caregiver will demonstrate understanding of hospice philosophy, plan of care and the home hospice program as evidenced by participation in meeting the patient's psychosocial, spiritual, medical, and physical needs inclusive of medical supplies/equipment focusing on symptoms. Outcome: Progressing Towards Goal     Problem: Potential for Alteration in Skin Integrity  Goal: Monitor skin for areas of alteration in skin integrity  Description: Patient/family/caregiver will demonstrate ability to care for patient's skin, monitor for areas of breakdown, and demonstrate methods to prevent breakdown during hospice care. Outcome: Progressing Towards Goal     Problem: Risk for Falls  Goal: Free of falls during inpatient stay  Description: Patient will be free of falls during inpatient stay. Outcome: Progressing Towards Goal     Problem: Alteration in Mobility  Goal: Remain as independent as possible and remain safe in environment  Description: Patient will remain as independent as possible and remain safe in their environment. Outcome: Progressing Towards Goal     Problem: Pain  Goal: Assess satisfaction of level of comfort and symptom control  Outcome: Progressing Towards Goal  Goal: *Control of acute pain  Outcome: Progressing Towards Goal     Problem: Anticipatory Grief  Goal: Explore reactions to and verbalize acceptance of impending loss  Description: Patient/family/caregiver will explore reactions to and verbalize acceptance of impending loss.   Outcome: Progressing Towards Goal     Problem: Anxiety/Agitation  Goal: Verbalize or staff assess the ability to manage anxiety  Description: The patient/family/caregiver will verbalize and demonstrate ability to manage the patient's anxiety throughout hospice care. Outcome: Progressing Towards Goal     Problem: Communication Deficit  Goal: Effectively communicate symptoms, needs, and concerns  Description: Patient/family/caregiver will effectively communicate symptoms, needs and concerns. Outcome: Progressing Towards Goal     Problem: Pressure Injury - Risk of  Goal: *Prevention of pressure injury  Outcome: Progressing Towards Goal  Note: Pressure Injury Interventions:  Sensory Interventions: Turn and reposition approx.  every two hours (pillows and wedges if needed),Pad between skin to skin,Monitor skin under medical devices,Minimize linen layers,Keep linens dry and wrinkle-free,Float heels    Moisture Interventions: Moisture barrier,Maintain skin hydration (lotion/cream),Check for incontinence Q2 hours and as needed,Apply protective barrier, creams and emollients,Absorbent underpads    Activity Interventions: Pressure redistribution bed/mattress(bed type)    Mobility Interventions: Float heels,HOB 30 degrees or less,Pressure redistribution bed/mattress (bed type)    Nutrition Interventions:  (pt unrepsonsive npo)    Friction and Shear Interventions: Feet elevated on foot rest,Apply protective barrier, creams and emollients,HOB 30 degrees or less,Transferring/repositioning devices

## 2022-07-05 NOTE — PROGRESS NOTES
[facility]  [phone]    Patient Name: Kindra Lisa  YOB: 1940    Date of Provider Hospice Visit: 07/05/22    Level of Care:   [x] General Inpatient (GIP)    [] Routine   [] Respite    Current Location of Care:  [] [facility] [] [facility] [] [address] [] [facility] [x] Hospice [facility], patient referred from:  [] [facility] [x] [facility] [] [address] [facility] [] [facility] [] Home [] Other:     Date of [address] Admission: 7/3/2022  Hospice Medical Director at time of admission: [email or web address] Diagnosis: Metabolic encephalopathy  Diagnoses RELATED to the terminal prognosis: End-stage dementia  Other Diagnoses:      HOSPICE SUMMARY     Kindra Lisa is a 79y.o. year old who was admitted to [facility]. Patient is an 57-year-old male with essentially end-stage dementialikely Alzheimer's type who presents to the hospital with increasing agitation, confusion, delirium, encephalopathy. His wife states he really has not allowed her even to basic hygiene in the home setting. According to his wife, patient has had a fairly rapid decline over the last couple weeks with decreased p.o. intake, agitation, generalized weakness, aggressiveness at times. Work-up in the emergency room including CT scan of the head was unremarkable, UA was unremarkable, blood work to include TSH and ammonia levels were unremarkable. This appeared to be significant progression of his underlying dementia with metabolic encephalopathy. Patient has struggled taking any type of oral medication, not eating, climbing out of bed at times and this appears to be appropriate for GIP level care secondary to symptom burden. Family initially had investigated transition to a nursing home with hospice care but given symptom burden, we transitioned him to the [facility] hospice house for GIP level care. Patient diagnosed with dementia approximately 5 years ago.   According to his wife, he used to be very calm, relatively laidback, wanted to be outdoors but that has completely changed over the last several months with being more restless, agitated and now will not go outside at all. She has had to be by his side 24/7 and also is completely exhausted being his caregiver    The patient's principle diagnosis has resulted in restlessness and agitation  Refer to LCD     Functionally, the patient's Karnofsky and/or Palliative Performance Scale has declined over a period of weeks to months and is estimated at 10-20. The patient is dependent on the following ADLs: All    Objective information that support this patients limited prognosis includes: See above note    The patient/family chose comfort measures with the support of Hospice. HOSPICE DIAGNOSES   Active Symptoms:  1. Metabolic encephalopathy  2. 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 Restlessness and agitation  4. Hospice care  5. SOB     PLAN   1. Patient will continue GIP level care as he needs frequent nursing assessments, IV medication management, not safe to attempt sublingual/oral medication. Patient had a very difficult night on Sunday night to the point where multiple adjustments in medication were made. He had both scheduled Ativan and morphine initiated for restlessness, shortness of breath and possible nonverbal signs of pain. It appears his morphine was stopped yesterday on a scheduled basis but he has required 3-4 as needed doses since. He definitely shows evidence of increased shortness of breath, work of breathing, secretions this morning. We will make further adjustments in medication  2. Ativan 2 mg IV every 2 hours scheduled every 15 minutes as needed for restlessness and agitation  3. Morphine 4 mg IV every 2 hours scheduled for shortness of breath and nonverbal signs of pain  4. Comfort meds for all other symptoms  5. Plan reviewed with bedside nursing team  6.  Patient's wife is resting this morning according to the nursing team.  She plans on coming this afternoon    7.  and SW to support family needs  8. Disposition: Anticipate death at the hospice house  9. Hospice Plan of care was reviewed in detail and agree with current plan of care    Prognosis estimated based on 07/05/22 clinical assessment is:   [x] Hours to Days possibly  [] Days to Weeks    [] Other:    Communicated plan of care with: Hospice Case Manager; Hospice IDT; Care Team     GOALS OF CARE     Patient/Medical POA stated Goal of Care: Hospice care    [x] I have reviewed and/or updated ACP information in the Advance Care Planning Navigator. This information is available in the [address] link in the patient's chart header. Primary Decision Maker ([address]):   Primary Decision Maker (Active): Harriet Lloyd - Spouse - [phone]    Resuscitation Status: DNR  If DNR is there a Durable DNR on file? : [x] Yes [] No (If no, complete Durable DNR)    HISTORY     History obtained from: Chart, spouse, hospice liaison    CHIEF COMPLAINT: Restlessness  The patient is:   [] Verbal  [x] Nonverbal  [] Unresponsive    HPI/SUBJECTIVE: Patient said a few words to [facility] nurse when he arrived. He is resting when I entered the room. 7/5-chart reviewed from the last 24 hours.   Patient is minimally responsive but does show some evidence of increased work of breathing       REVIEW OF SYSTEMS     The following systems were: [] reviewed  [x] unable to be reviewed    Positive ROS include:  Constitutional: fatigue, weakness, in pain, short of breath  Ears/nose/mouth/throat: increased airway secretions  Respiratory:shortness of breath, wheezing  Gastrointestinal:poor appetite, nausea, vomiting, abdominal pain, constipation, diarrhea  Musculoskeletal:pain, deformities, swelling legs  Neurologic:confusion, hallucinations, weakness  Psychiatric:anxiety, feeling depressed, poor sleep  Endocrine:     Adult Non-Verbal Pain Assessment Score: 5    Face  [] 0   No particular expression or smile  [x] 1   Occasional grimace, tearing, frowning, wrinkled forehead  [] 2   Frequent grimace, tearing, frowning, wrinkled forehead    Activity (movement)  [] 0   Lying quietly, normal position  [] 1   Seeking attention through movement or slow, cautious movement  [x] 2   Restless, excessive activity and/or withdrawal reflexes    Guarding  [x] 0   Lying quietly, no positioning of hands over areas of body  [] 1   Splinting areas of the body, tense  [] 2   Rigid, stiff    Physiology (vital signs)  [x] 0   Stable vital signs  [] 1   Change in any of the following: SBP > 20mm Hg; HR > 20/minute  [] 2   Change in any of the following: SBP > 30mm Hg; HR > 25/minute    Respiratory  [] 0   Baseline RR/SpO2, compliant with ventilator  [] 1   RR > 10 above baseline, or 5% drop SpO2, mild asynchrony with ventilator  [x] 2   RR > 20 above baseline, or 10% drop SpO2, asynchrony with ventilator     FUNCTIONAL ASSESSMENT     Palliative Performance Scale (PPS): 10       PSYCHOSOCIAL/SPIRITUAL ASSESSMENT     Active Problems:    * No active hospital problems.  *    Past Medical History:   Diagnosis Date    Skin cancer       Past Surgical History:   Procedure Laterality Date    HX MOHS PROCEDURES  03/27/2018    BCC R scaphoid fossa by Dr. Zaira Calderon History     Tobacco Use    Smoking status: Former Smoker    Smokeless tobacco: Never Used   Substance Use Topics    Alcohol use: Not on file     Family History   Problem Relation Age of Onset    Cancer Mother       No Known Allergies   Current Facility-Administered Medications   Medication Dose Route Frequency    glycopyrrolate (ROBINUL) injection 0.2 mg  0.2 mg IntraVENous Q4H PRN    morphine injection 4 mg  4 mg IntraVENous Q2H    ziprasidone (GEODON) 20 mg in sterile water (preservative free) 1 mL injection  20 mg IntraMUSCular Q12H PRN    LORazepam (ATIVAN) injection 2 mg  2 mg IntraVENous Q15MIN PRN    morphine injection 4 mg  4 mg IntraVENous Q15MIN PRN    LORazepam (ATIVAN) injection 2 mg  2 mg IntraVENous Q2H    bisacodyL (DULCOLAX) suppository 10 mg  10 mg Rectal DAILY PRN    haloperidol (HALDOL) 2 mg/mL oral solution 2 mg  2 mg SubLINGual Q4H PRN    LORazepam (INTENSOL) 2 mg/mL oral concentrate 1 mg  1 mg Oral Q1H PRN    morphine (ROXANOL) 100 mg/5 mL (20 mg/mL) concentrated solution 5 mg  5 mg Oral Q1H PRN    acetaminophen (TYLENOL) suppository 650 mg  650 mg Rectal Q6H PRN    haloperidol (HALDOL) 2 mg/mL oral solution 2 mg  2 mg Oral Q4H PRN        PHYSICAL EXAM     Wt Readings from Last 3 Encounters:   07/03/22 93.4 kg (206 lb)   06/28/22 93.4 kg (206 lb)   03/27/18 104.3 kg (230 lb)       Visit Vitals  BP (!) 88/60 (BP 1 Location: Right arm, BP Patient Position: At rest)   Pulse 79   Temp 97.8 °F (36.6 °C)   Resp 16   Ht 6' 1\" (1.854 m)   Wt 93.4 kg (206 lb)   SpO2 (!) 80%   BMI 27.18 kg/m²       Supplemental O2  [] Yes  [x] NO  Last bowel movement:     Currently this patient has:  [] Peripheral IV [] PICC  [] PORT [] ICD    [] Rubio Catheter [] NG Tube   [] PEG Tube    [] Rectal Tube [] Drain  [] Other:     Constitutional: Unresponsive, slight grimacing, appears calm right now, increased work of breathing, mild accessory muscle use  Eyes: Closed  ENMT: Slightly dry  Cardiovascular: Irregular regular  Respiratory: Increased work of breathing, mild accessory muscle use, secretions noted  Gastrointestinal: Soft  Musculoskeletal: Muscle wasting  Skin: Unremarkable  Neurologic: Nonfocal  Psychiatric: Calm right now  Other:       Pertinent Lab and or Imaging Tests:  Lab Results   Component Value Date/Time    Sodium 141 07/02/2022 04:25 AM    Potassium 3.2 (L) 07/02/2022 04:25 AM    Chloride 105 07/02/2022 04:25 AM    CO2 28 07/02/2022 04:25 AM    Anion gap 8 07/02/2022 04:25 AM    Glucose 126 (H) 07/02/2022 04:25 AM    BUN 13 07/02/2022 04:25 AM    Creatinine 0.58 (L) 07/02/2022 04:25 AM    BUN/Creatinine ratio 22 (H) 07/02/2022 04:25 AM    GFR est AA >60 07/02/2022 04:25 AM    GFR est non-AA >60 07/02/2022 04:25 AM    Calcium 8.6 07/02/2022 04:25 AM     Lab Results   Component Value Date/Time    Protein, total 6.5 06/23/2022 10:44 PM    Albumin 3.5 06/23/2022 10:44 PM           Total time:   Counseling / coordination time:   > 50% counseling / coordination?:

## 2022-07-05 NOTE — PROGRESS NOTES
NAME OF PATIENT:  Jose De La Rosa    LEVEL OF CARE:  [facility]    REASON FOR GIP:   Pain, despite numerous changes in medications, Medication adjustment that must be monitored 24/7 and Stabilizing treatment that cannot take place at home    *PATIENT REMAINS ELIGIBLE FOR [facility] LEVEL OF CARE AS EVIDENCED BY: (MUST BE ADDRESSED OF PATIENT GIP)Patient requires frequent monitoring and assessment due to agitation,  increasing pain and secretions. Patient receiving scheduled IV lorazepam, morphine and prn robinul. REASON FOR RESPITE:  n/a    O2 SAFETY:  n/a    FALL INTERVENTIONS PROVIDED:   Implemented/recommended use of fall risk identification flag to all team members, Implemented/recommended resources for alarm system (personal alarm, bed alarm, call bell, etc.) , Implemented/recommended environmental changes (remove hazards, lower bed, improve lighting, etc.) and Implemented/recommended increased supervision/assistance    INTERDISPLINARY COMMUNICATION/COLLABORATION:  Physician, MSW, Dave and RN, CNA    NEW MEDICATION INITIATION DOCUMENTATION:  Obtained Order from Provider for initiation of symptom relief medication /other medication needed    Reason medication is being initiated: increased pain, secreations    MD / Provider name consulted re: change in status / initiation of new medication:  Dr Claudio Tovar Symptom(s):  Pain, secretions      New Order(s): Scheduled 4mg morphine IV Q2hrs pain, robinul 0.2mg IV Q4hrs prn secretions     Name of the person notified of the changes: [address]    Name of person being taught: Tabatha Aguilera Spouse     Instructions given: Medications prescribed, frequency and uses.      Side Effects taught: symptom relief    Response to teaching: receptive and appreciative        COMFORTABLE DYING MEASURE:  Is Patient/family satisfied with symptom level?  yes    DISCHARGE PLAN:  EOL or discharge home if symptoms are manageable    0700- report received from Fadumo RN    1624- Patient resting with eyes closed with  occasional moan. Respirations shallow and unlabored. Assessment complete, see flow sheet. New 24g peripheral IV inserted to lower left forearm near wrist. Scheduled lorazepam given. Heart sounds irregularly  Irregular. Inside of right heel pink. Bed in lowest position, call bell within reach, bed alarm on.     0845- Verbal order received from Dr Monica Hou for Robinul 0.2mg IV q4hrs prn for secretions. 0930- Patient turned and repositioned with pillows on left side. Brief changed with assistance of [facility] CNA. Patients upper extremities tense with movement with occasional moaning. [id number] Dr Monica Hou and IDG team at bedside. Verbal order received from Dr Monica Hou for  morphine 4mg IV q2hrs scheduled due to facial grimacing and increased respirations. 1017- Patient respirations shallow and labored. Respirations 28-30. Scheduled lorazepam, morphine and prn robinul given. 1115- patient resting with eyes closed, call bell within reach, bed in lowest position, bed alarm on. Care channel on for comfort. 1210- Scheduled  IV lorazepam and morphine given, mouth care provided. 1330- Patients brief checked for incontinence, brief dry at this time. Patient turned at repositioned with pillows on right side with assistance of [facility]. Patient having periods of apena at this time. 1400- scheduled IV morphine and lorazepam given. Patient appears less tachypneic. 1517- patient resting with eyes closed. Patient continues to be unresponsive at this time. Bed alarm on.    1620- Patient medicated with scheduled IV lorazepam and morphine. Brief checked for incontinence. Patient dry at this time. [facility] with patients spouse to [employer] update on patients condition. Spouse verbalized permission to insert indwelling sahni catheter due to possible retention and comfort.  Spouse reports she was not feeling well and will visit the patient tomorrow. 1720- 16fr indwelling sahni catheter inserted using sterile technique without difficulty with assistance of Chevy FRAGA. Patient drained around 200ml concentrated thick orange urine. Stat lock placed. Dr Siva Cohn at bedside. Patient turned and repositioned with pillows on left side. 1815- Scheduled IV lorazepam and morphine given. Upper airway secretions noted. 1837- Prn Robinul given for increased upper airway secretions. 225ml concentrated orange/lupillo urine emptied from indwelling sahni catheter.       1900- Report given to [address]

## 2022-07-05 NOTE — PROGRESS NOTES
Problem: Pressure Injury - Risk of  Goal: *Prevention of pressure injury  Description: Document Blaise Scale and appropriate interventions in the flowsheet. Outcome: Progressing Towards Goal  Note: Pressure Injury Interventions:  Sensory Interventions: Assess changes in LOC,Check visual cues for pain,Keep linens dry and wrinkle-free,Minimize linen layers    Moisture Interventions: Absorbent underpads,Minimize layers    Activity Interventions: Pressure redistribution bed/mattress(bed type)    Mobility Interventions: Float heels,Pressure redistribution bed/mattress (bed type)    Nutrition Interventions: Document food/fluid/supplement intake,Offer support with meals,snacks and hydration    Friction and Shear Interventions: Minimize layers                Problem: Falls - Risk of  Goal: *Absence of Falls  Description: Document Bobby Fall Risk and appropriate interventions in the flowsheet. Outcome: Progressing Towards Goal  Note: Fall Risk Interventions:  Mobility Interventions: Bed/chair exit alarm    Mentation Interventions: Adequate sleep, hydration, pain control,Bed/chair exit alarm,Door open when patient unattended,More frequent rounding,Room close to nurse's station    Medication Interventions: Bed/chair exit alarm    Elimination Interventions: Bed/chair exit alarm    History of Falls Interventions: Bed/chair exit alarm,Door open when patient unattended,Room close to nurse's station         Problem: Potential for Alteration in Skin Integrity  Goal: Monitor skin for areas of alteration in skin integrity  Description: Patient/family/caregiver will demonstrate ability to care for patient's skin, monitor for areas of breakdown, and demonstrate methods to prevent breakdown during hospice care.   Outcome: Progressing Towards Goal     Problem: Anxiety/Agitation  Goal: Verbalize or staff assess the ability to manage anxiety  Description: The patient/family/caregiver will verbalize and demonstrate ability to manage the patient's anxiety throughout hospice care.   Outcome: Progressing Towards Goal

## 2022-07-05 NOTE — HOSPICE
Initial spiritual care visit with the patient. The patient was lying in bed and did not respond. No visitors present . The  called the patients wife who said she was planning on coming to visit this afternoon. She asked if the  would prayer for her and the patient . She shared her emotions and the struggle she is facing . The  validated her emotions. The  and wife discussed self-care through this process. She would like the  to continue visiting the patient to offer pastoral and prayer support.

## 2022-07-05 NOTE — [facility] IDG MASTER NOTE
[facility]  Date: 07/05/22  Time: 12:57 PM    ___________________    Patient: Alex Bhatti  Coverage Information:     Payor: [employer]     Plan: [facility] MEDICARE CHOICE PPO/PFFS     Subscriber ID: [id number]     Phone Number:   MRN: [id number]  CCN:   [place] Claim No. :     Hospice Election Date:   Current Benefit Period: Benefit Period 1  Start Date: 7/3/2022  End Date: 9/30/2022      Medical Director:   Hospice Attending Provider: Cristiano Soto MD  [address] [place] [address]  Phone: [phone]  Fax: [phone]    Level of Care: General Inpatient Care      ___________________    Diagnoses:  Diagnoses of Hospice care, Restlessness and agitation, Alzheimer's dementia with behavioral disturbance, unspecified timing of dementia onset ([address] Metabolic encephalopathy, and Shortness of breath were pertinent to this visit.     Current Medications:    Current Facility-Administered Medications:     glycopyrrolate (ROBINUL) injection 0.2 mg, 0.2 mg, IntraVENous, L3I PRN, Cristiano Soto MD, 0.2 mg at 07/05/22 1014    morphine injection 4 mg, 4 mg, IntraVENous, R8X, Stacey Valle MD, 4 mg at 07/05/22 1206    ziprasidone (GEODON) 20 mg in sterile water (preservative free) 1 mL injection, 20 mg, IntraMUSCular, X17V PRN, Cristiano Soto MD, 20 mg at 07/04/22 0257    LORazepam (ATIVAN) injection 2 mg, 2 mg, IntraVENous, [id number] PRN, Cristiano Soto MD, 2 mg at 07/04/22 0304    morphine injection 4 mg, 4 mg, IntraVENous, K15UHQ PRN, Cristiano Soto MD, 4 mg at 07/05/22 1013    LORazepam (ATIVAN) injection 2 mg, 2 mg, IntraVENous, V5U, Stacey Valle MD, 2 mg at 07/05/22 1207    bisacodyL (DULCOLAX) suppository 10 mg, 10 mg, Rectal, DAILY PRN, Stacey Valle MD    haloperidol (HALDOL) 2 mg/mL oral solution 2 mg, 2 mg, SubLINGual, E9G PRN, Cristiano Soto MD, 2 mg at 07/04/22 0016    LORazepam (INTENSOL) 2 mg/mL oral concentrate 1 mg, 1 mg, Oral, H5Q PRN, Missy Sanchez MD, 1 mg at 07/03/22 2343    morphine (ROXANOL) 100 mg/5 mL (20 mg/mL) concentrated solution 5 mg, 5 mg, Oral, W8Z PRN, Christine Valle MD    acetaminophen (TYLENOL) suppository 650 mg, 650 mg, Rectal, F3N PRN, Christine Valle MD    haloperidol (HALDOL) 2 mg/mL oral solution 2 mg, 2 mg, Oral, M2E PRN, Missy Sanchez MD, 2 mg at 07/03/22 1949    Orders:  Orders Placed This Encounter    ADULT DIET Dysphagia - Soft & Bite Sized     Standing Status:   Standing     Number of Occurrences:   1     Order Specific Question:   Primary Diet:     Answer:   Dysphagia - Soft & Bite Sized    NURSING-MISCELLANEOUS: Admit to [facility] Level of Care GIP- Admit to [facility] level of care: Skilled Nurse - Daily x 14 days with 5 PRN visits for symptom control MSW - 1x weekly with 5 PRN family support and need for volunteer services  - 1 x weekl. .. GIP- Admit to [facility] level of care:   Skilled Nurse - Daily x 14 days with 5 PRN visits for symptom control  MSW - 1x weekly with 5 PRN family support and need for volunteer services   - 1 x weekly with 5 visits PRN spiritual support   CNA - daily x 14 days     Standing Status:   Standing     Number of Occurrences:   1     Order Specific Question:   Description of Order:     Answer:   Admit to GIP Level of Care    NURSING-MISCELLANEOUS: (see comments) 1. NO admission labs, x-rays or other diagnostic tests, unless pertinent to symptom control . 2. Discontinue ALL prior medications. CONTINUOUS     1. NO admission labs, x-rays or other diagnostic tests, unless pertinent to symptom control . 2. Discontinue ALL prior medications.      Standing Status:   Standing     Number of Occurrences:   1     Order Specific Question:   Description of Order:     Answer:   (see comments)    COMFORT MEASURES ONLY     Standing Status:   Standing     Number of Occurrences:   1    VITAL SIGNS     Standing Status:   Standing Number of Occurrences:   [id number]    NOTIFY PROVIDER: SPECIFY NOTIFY PROVIDER: FOR PAIN, DYSPNEA, AGITATION, OTHER DISTRESS OR NOT RESPONDING TO ORDERED INTERVENTIONS CONTINUOUS Routine     Standing Status:   Standing     Number of Occurrences:   1     Order Specific Question:   Please describe the test or procedure you would like to order. Answer:   NOTIFY PROVIDER: FOR PAIN, DYSPNEA, AGITATION, OTHER DISTRESS OR NOT RESPONDING TO ORDERED INTERVENTIONS    NURSING-MISCELLANEOUS: BITES AND SIPS FOR COMFORT CONTINUOUS     Standing Status:   Standing     Number of Occurrences:   1     Order Specific Question:   Description of Order:     Answer:   BITES AND SIPS FOR COMFORT    ORAL CARE     Keep mouth moisturized with sponge sticks/toothettes and tap water. Vaseline to lips and nares as needed. Standing Status:   Standing     Number of Occurrences:   1    NURSING-MISCELLANEOUS: UTILIZATION OF CARE CHANNEL CONTINUOUS     Standing Status:   Standing     Number of Occurrences:   1     Order Specific Question:   Description of Order:     Answer:   UTILIZATION OF CARE CHANNEL    NURSING-MISCELLANEOUS: DISTRACTION FOR AGITATED PATIENT CONTINUOUS     Standing Status:   Standing     Number of Occurrences:   1     Order Specific Question:   Description of Order:     Answer:   DISTRACTION FOR AGITATED PATIENT    NURSING-MISCELLANEOUS: (SEE COMMENTS) 1. Nurse to identify  home 2. Nurse to identify and recognize any cultural, spiritual or family preferences surrounding patient care  CONTINUOUS     1. Nurse to identify  home  2.  Nurse to identify and recognize any cultural, spiritual or family preferences surrounding patient care     Standing Status:   Standing     Number of Occurrences:   1     Order Specific Question:   Description of Order:     Answer:   (SEE COMMENTS)    BEDREST, COMPLETE     Standing Status:   Standing     Number of Occurrences:   1     [facility] - PATIENT MAY REFUSE     Standing Status:   Standing     Number of Occurrences:   1    NURSING-MISCELLANEOUS: IV/Subcutaneous Access May obtain PIV or Subcutaneous Access and administerer IV or Subcutaneous medications if SL medications not tolerated or ineffective  CONTINUOUS     May obtain PIV or Subcutaneous Access and administerer IV or Subcutaneous medications if SL medications not tolerated or ineffective     Standing Status:   Standing     Number of Occurrences:   1     Order Specific Question:   Description of Order:     Answer:   IV/Subcutaneous Access    DO NOT RESUSCITATE     Standing Status:   Standing     Number of Occurrences:   1    OXYGEN CANNULA Liters per minute: 2; Indications for O2 therapy: OTHER PRN Routine     Oxygen as needed. Adjust for comfort. Discontinue if not contributing to patient comfort. Standing Status:   Standing     Number of Occurrences:   [id number]     Order Specific Question:   Liters per minute:      Answer:   2     Order Specific Question:   Indications for O2 therapy     Answer:   OTHER    FALL PRECAUTIONS     Standing Status:   Standing     Number of Occurrences:   1    bisacodyL (DULCOLAX) suppository 10 mg    haloperidol (HALDOL) 2 mg/mL oral solution 2 mg    LORazepam (INTENSOL) 2 mg/mL oral concentrate 1 mg    morphine (ROXANOL) 100 mg/5 mL (20 mg/mL) concentrated solution 5 mg    DISCONTD: ziprasidone (GEODON) capsule 20 mg    acetaminophen (TYLENOL) suppository 650 mg    DISCONTD: LORazepam (ATIVAN) injection 1 mg    DISCONTD: morphine injection 1 mg    haloperidol (HALDOL) 2 mg/mL oral solution 2 mg    ziprasidone (GEODON) 20 mg in sterile water (preservative free) 1 mL injection    LORazepam (ATIVAN) injection 2 mg    morphine injection 4 mg    LORazepam (ATIVAN) injection 2 mg    DISCONTD: morphine injection 4 mg    glycopyrrolate (ROBINUL) injection 0.2 mg    morphine injection 4 mg    INITIAL PHYSICIAN ORDER: HOSPICE Level Of Care: General Inpatient; Reason for Admission: Acute Metabolic Encephalopathy     Standing Status:   Standing     Number of Occurrences:   1     Order Specific Question:   Status     Answer:   Hospice     Order Specific Question:   Level Of Care     Answer:   General Inpatient     Order Specific Question:   Reason for Admission     Answer:   Acute Metabolic Encephalopathy     Order Specific Question:   Admitting Physician     Answer:   Amandeep Villalba [[id number]]     Order Specific Question:   Attending Physician     Answer:   Amandeep Manatee [[id number]]     Order Specific Question:   Discharge Plan:     Answer:   Home with Home Hospice       Allergies:  No Known Allergies    Care Plan:  Encounter Problems (Active)     Problem: Alteration in Mobility     Dates: Start: 07/03/22       Disciplines: Interdisciplinary    Goal: Remain as independent as possible and remain safe in environment     Dates: Start: 07/03/22       Description: Patient will remain as independent as possible and remain safe in their environment. Disciplines: Interdisciplinary    Intervention: Assess for deficits in mobility     Dates: Start: 07/03/22          Intervention: Instruct on mobility methods     Dates: Start: 07/03/22       Description: Instruct patient/caregiver on mobility methods. Problem: Anticipatory Grief     Dates: Start: 07/03/22       Disciplines: Interdisciplinary    Goal: Explore reactions to and verbalize acceptance of impending loss     Dates: Start: 07/03/22       Description: Patient/family/caregiver will explore reactions to and verbalize acceptance of impending loss.     Disciplines: Interdisciplinary    Intervention: Assess grief responses     Dates: Start: 07/03/22          Intervention: Assist with grief counseling     Dates: Start: 07/03/22       Description:  to assist.       Intervention: Tru Vasques on stages of grief     Dates: Start: 07/03/22       Description: Instruct patient/caregiver on stages of grief.       Intervention: Offer grief support group     Dates: Start: 07/03/22       Description: Patient/family/caregiver will explore reactions to and verbalize acceptance of impending loss. Problem: Anxiety/Agitation     Dates: Start: 07/03/22       Disciplines: Interdisciplinary    Goal: Verbalize or staff assess the ability to manage anxiety     Dates: Start: 07/03/22       Description: The patient/family/caregiver will verbalize and demonstrate ability to manage the patient's anxiety throughout hospice care. Disciplines: Interdisciplinary    Intervention: Assess for anxiety/agitation     Dates: Start: 07/03/22       Description: Assess for signs and symptoms of anxiety and agitation. Intervention: Instruct/Implement strategies to reduce anxiety/agitation     Dates: Start: 07/03/22       Description: Instruct patient/caregiver on strategies to reduce anxiety/agitation. Problem: Communication Deficit     Dates: Start: 07/03/22       Disciplines: Interdisciplinary    Goal: Effectively communicate symptoms, needs, and concerns     Dates: Start: 07/03/22       Description: Patient/family/caregiver will effectively communicate symptoms, needs and concerns. Disciplines: Interdisciplinary    Intervention: Assess for deficit in communication     Dates: Start: 07/03/22          Intervention: Instruct/Implement strategies to effectively communicate     Dates: Start: 07/03/22       Description: Instruct patient/caregiver on strategies to effectively communicate. Problem: Falls - Risk of     Dates: Start: 07/03/22       Disciplines: Interdisciplinary    Goal: *Absence of Falls     Dates: Start: 07/03/22   Expected End: 07/14/22       Description: Document Deandre Pate Fall Risk and appropriate interventions in the flowsheet.     Disciplines: Interdisciplinary          Problem: Hospice Orientation     Dates: Start: 07/03/22       Disciplines: Interdisciplinary    Goal: Demonstrate understanding of hospice philosophy, plan of care, and home hospice program     Dates: Start: 07/03/22       Description: The patient/family/caregiver will demonstrate understanding of hospice philosophy, plan of care and the home hospice program as evidenced by participation in meeting the patient's psychosocial, spiritual, medical, and physical needs inclusive of medical supplies/equipment focusing on symptoms. Disciplines: Interdisciplinary    Intervention: Instruct on hospice philosophy     Dates: Start: 07/03/22          Intervention: Instruct: hospice orientation     Dates: Start: 07/03/22          Intervention: Instruct: medical equipment     Dates: Start: 07/03/22       Description: Instruct patient/caregiver on medical equipment and supplies.        Intervention: Instruct: medical power of  and will     Dates: Start: 07/03/22          Intervention: Instruct:terminal illness     Dates: Start: 07/03/22                Problem: Pain     Dates: Start: 07/03/22       Disciplines: Interdisciplinary    Goal: Assess satisfaction of level of comfort and symptom control     Dates: Start: 07/03/22       Disciplines: Interdisciplinary    Intervention: Assess effectiveness of pain management     Dates: Start: 07/03/22          Intervention: Assess for signs/symptoms of acute pain     Dates: Start: 07/03/22          Intervention: Assess for signs/symptoms of chronic pain     Dates: Start: 07/03/22          Intervention: Implement non-pharmacological pain management     Dates: Start: 07/03/22          Intervention: Instruct on pain scales     Dates: Start: 07/03/22          Intervention: Implement pharmacological pain management     Dates: Start: 07/03/22             Goal: *Control of acute pain     Dates: Start: 07/03/22       Disciplines: Interdisciplinary    Intervention: Assess pain characteristics (eg: Intensity scale; onset; location; quality; severity; duration; frequency; radiation)     Dates: Start: 07/03/22          Intervention: Assess pain management - barriers (eg: Past pain experiences)     Dates: Start: 07/03/22          Intervention: Identify pain expectations (eg: Patient's pain goal; somatic experiences; behavioral changes; affect)     Dates: Start: 07/03/22          Intervention: Identify pain medication concerns (eg: Cultural considerations; addiction concerns)     Dates: Start: 07/03/22          Intervention: Support system identification (eg: Caregiver; community resource; family; friends; Zoroastrianism; support group)     Dates: Start: 07/03/22          Intervention: Monitor for change in patient condition (eg:  Vital signs changes; changes in level of consciousness; nausea; behavioral changes)     Dates: Start: 07/03/22          Intervention: Medication side-effect assessment     Dates: Start: 07/03/22          Intervention: Pain-relief response reassessment (eg: Frequency based on route of administration; effectiveness)     Dates: Start: 07/03/22                Problem: Patient Education: Go to Patient Education Activity     Dates: Start: 07/03/22       Disciplines: Interdisciplinary    Goal: Patient/Family Education     Dates: Start: 07/03/22       Disciplines: Interdisciplinary          Problem: Patient Education: Go to Patient Education Activity     Dates: Start: 07/03/22       Disciplines: Interdisciplinary    Goal: Patient/Family Education     Dates: Start: 07/03/22       Disciplines: Interdisciplinary          Problem: Potential for Alteration in Skin Integrity     Dates: Start: 07/03/22       Disciplines: Interdisciplinary    Goal: Monitor skin for areas of alteration in skin integrity     Dates: Start: 07/03/22       Description: Patient/family/caregiver will demonstrate ability to care for patient's skin, monitor for areas of breakdown, and demonstrate methods to prevent breakdown during hospice care.     Disciplines: Interdisciplinary    Intervention: Assess for skin breakdown     Dates: Start: 07/03/22          Intervention: Implement strategies to reduce risk of skin breakdown     Dates: Start: 07/03/22                Problem: Pressure Injury - Risk of     Dates: Start: 07/03/22       Disciplines: Interdisciplinary    Goal: *Prevention of pressure injury     Dates: Start: 07/03/22   Expected End: 07/14/22       Description: Document Blaise Scale and appropriate interventions in the flowsheet. Disciplines: Interdisciplinary          Problem: Pressure Injury - Risk of     Dates: Start: 07/03/22       Disciplines: Interdisciplinary    Goal: *Prevention of pressure injury     Dates: Start: 07/03/22   Expected End: 07/14/22       Disciplines: Interdisciplinary    Intervention: Pressure injury risk assessment tool     Dates: Start: 07/03/22          Intervention: Pressure-relieving device needs assessment     Dates: Start: 07/03/22          Intervention: Pressure-relieving device implementation (eg: Specialty beds; wheel chair cushions; heel lift boots)     Dates: Start: 07/03/22          Intervention: Skin assessment (e.g. existing ulcers, color; integrity; moisture; raymond prominences; blisters; friction/shear injuries)     Dates: Start: 07/03/22          Intervention: Skin monitoring and care (e.g. skin cleansing; keep dry, moisturize, utilization of  lotion and/or skin barrier cream)     Dates: Start: 07/03/22          Intervention: Position change (eg: Techniques to position; turn and transfer per schedule; cushion raymond prominences; float heels; encourage mobility)     Dates: Start: 07/03/22                Problem: Risk for Falls     Dates: Start: 07/03/22       Disciplines: Interdisciplinary    Goal: Free of falls during inpatient stay     Dates: Start: 07/03/22       Description: Patient will be free of falls during inpatient stay.     Disciplines: Interdisciplinary    Intervention: Assess fall risk     Dates: Start: 07/03/22       Description: Complete fall risk assessment on admission, recertification, and as indicated on fall. Intervention: Instruct on fall prevention     Dates: Start: 07/03/22       Description: Call for assistance with ambulation and transfers during inpatient stay             Problem: Spiritual Evaluation     Dates: Start: 07/05/22       Disciplines: Interdisciplinary    Goal: Identify beliefs/practices that support hospice experience     Dates: Start: 07/05/22       Description: Patient/family identify their beliefs/practices that impair Hospice experience. Patient/family identify their beliefs/practices that support Hospice experience. Patient coping identified. Spiritual distress identified and decreased with visit. Disciplines: Interdisciplinary    Intervention: Provide spiritual support     Dates: Start: 07/05/22       Description: Assist with coordination of spiritual needs. Needs may include communion, anointing, / visits, and fear.              Care Plan Problems/Goals  Report    0 of 15 Goals Met 0 of 15 Met     Progressing Towards Goal (14)      *Prevention of pressure injury (Pressure Injury - Risk of)      Patient/Family Education (Patient Education: Go to Patient Education Activity)      *Absence of Falls (Falls - Risk of)      Patient/Family Education (Patient Education: Go to Patient Education Activity)      Demonstrate understanding of hospice philosophy, plan of care, and home hospice program (Hospice Orientation)      Monitor skin for areas of alteration in skin integrity (Potential for Alteration in Skin Integrity)      Free of falls during inpatient stay (Risk for Falls)      Remain as independent as possible and remain safe in environment (Alteration in Mobility)      Assess satisfaction of level of comfort and symptom control (Pain)      *Control of acute pain (Pain)      Explore reactions to and verbalize acceptance of impending loss (Anticipatory Grief)      Verbalize or staff assess the ability to manage anxiety (Anxiety/Agitation)      Effectively communicate symptoms, needs, and concerns (Communication Deficit)      *Prevention of pressure injury (Pressure Injury - Risk of)                   No Outcome (1)      Identify beliefs/practices that support hospice experience (Spiritual Evaluation)                        ___________________    Care Team Notes    IDG_ clinical note- pt is GIP at [facility] related to EOL s/sx of pain, restlessness, and increased secretions. Pt is requiring IV meds and frequent medical and nursing interventions. Pt currently needs care that cant be provided in a home setting. POC/IDG Notes      [facility] IDG  Notes by Mikala Wong at 07/05/22 1027  Version 1 of 1    Author: Rashaun Ross Service: Hospice and Palliative Care Author Type: Pastoral Care    Filed: 07/05/22 1029 Date of Service: 07/05/22 1027 Status: Signed    : Rashaun Ross (Pastoral Care)       Patient: Patricia Farnaz    Date: 07/05/22  Time: 10:27 AM    [facility]  Notes  Attended [facility] IDG rounds. The patient was lying in bed and did not respond. Called patient's wife to complete spiritual assessment and offered emotional and spiritual support.     Goal for the next week:  Provide pastoral presence and prayer for strength at the wife's request.      Signed by: Jelani Castro [id number] Team Present:     Ramone Lobo RN, Nurse Leader

## 2022-07-05 NOTE — [facility] IDG CHAPLAIN NOTES
Patient: Ellyn Larry    Date: 07/05/22  Time: 10:27 AM    [facility]  Notes  Attended [facility] IDG rounds. The patient was lying in bed and did not respond. Called patient's wife to complete spiritual assessment and offered emotional and spiritual support.     Goal for the next week:  Provide pastoral presence and prayer for strength at the wife's request.      Signed by: Gwen Rogers

## 2022-07-06 NOTE — HOSPICE
[facility]  [phone]    Social Work Admission Note  Patient Name: Philippe Villafuerte  YOB: 1940  Age: 79 y.o. Date of Visit: 07/05/22  Facility of Care: [facility]  Patient Room: 08/01     Hospice Attending: Stormy Thornton MD  Hospice Diagnosis: Acute Metabolic Encephalopathy    Level of Care:    [x]  GIP    []  Respite   []  Routine    NARRATIVE   LCSW visited pt for initial psychosocial assessment. Pt was lethargic and appeared to be in the active stage of dying. Pt had no reports of pain and appeared comfortable. No family at bedside. RN reported pts wife, Madelon Leventhal was not feeling well but might be in later today. LCSW called pts wife to offer support and assess needs. Pts wife stated she was very stressed and really could not talk right now. Pts wife stated she had received a large bill from pts hospital stay that insurance was saying was not covered. Pts wife stated she has been back and forth with insurance company and they did state they believe it was a mistake. LCSW confirmed that it ws a bill for hospital and not hospice. LCSW provided support and encouragement for pts wife to call with any needs. Pts wife stated she planned to visit tomorrow. LCSW dicussed importance of self care. Patient diagnosed with dementia approximately 5 years ago. According to his wife, he used to be very calm, relatively laidback, wanted to be outdoors but that has completely changed over the last several months with being more restless, agitated and now will not go outside at all. Pt has 2 daughters and one son. 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 LCSW will attempt to visit with pt and pts wife tomorrow when she visits.  LCSW will continue monitor and assess needs.        ADVANCE CARE PLANNING    Code Status: DNR  Durable DNR: Teri Avila  Advance Care Planning 6/30/2022   Patient's Healthcare Decision Maker is: Legal Next of Kin Relationship Status:  []  Single     [x]        []      []  Domestic Partner     []  /  []  Common Law  []    []  Unknown    If in a relationship, name of partner/spouse:Harriet      Cheondoism: Sikh     Home: TBD  Resources Provided: None needed at this time. Social Work Initial Assessment     Gender:  male    Race/Ethnicity: (page all that apply)  []  American [place]) or [place] Native  []    []  Black or [place] American  []   or   []   or [place]  [x]  Esther Munizler  []  Unknown      Service:    []  Yes   []  No       [x]  Unknown  Appropriate for Pinning Ceremony:   []  Yes      []  No  Is patient using VA benefits?    []  Yes      []  No     Primary Language: English  []   Needed  []   utilized during visit    Ability to express thoughts/needs/feelings  []  Expressed thoughts/feelings/needs without difficulty  []  Requires extra time and cuing  []  Speech limited single words  []  Uses only gestures (eye, blinking eye or head movement/pointing)  []  Unable to express thoughts/feelings/needs (speech unintelligible or inappropriate)  [x]  Unresponsive  Notes: Pt appears to be in the active stage of dying.     Mental Status:  []  Alert-oriented to:     []  Person     []  Place     []  Time  [x]  Comatose-responds to:    []   Verbal stimuli    []  Tactile stimuli    []  Painful stimuli  []  Forgetful  []  Disoriented/Confused  [x]  Lethargic  []  Agitated  []  Other (specify):    Notes: Pt appears to be in the active stage of dying,.     Patients description of Illness/Current Health Status:    [x]  Patient unable to discuss  []  Patient unwilling to discuss  []  (Specify)        Knowledge/Understanding of Disease Process  Patient:    [x]  Demonstrates knowledge/understanding of disease process   [x]  Demonstrates knowledge/understanding of treatment plan   [x]  Demonstrates knowledge/understanding of prognosis   [x]  Demonstrates acceptance of prognosis   [x]  Demonstrates knowledge/understanding of resuscitation status   []  Other (specify)  Caregiver:   [x]  Demonstrates knowledge/understanding of disease process   [x]  Demonstrates knowledge/understanding of treatment plan   [x]  Demonstrates knowledge/understanding of prognosis   [x]  Demonstrates acceptance of prognosis   [x]  Demonstrates knowledge/understanding of resuscitation status   []  Other (specify)  Notes:  Pts wife reports being accepting.     Patients living arrangement/care setting:  Use the [address] [place] [address] when the PATIENTS current health status necessitated a change in his/her primary residence. Prior Current Response              []             []    Patients own home/residence              [x]             []    Home of family member/friend              []             []    Boarding home              []             []    Assisted living facility/assisted center              []             []    Hospital/Acute care facility              []             []    Skilled nursing facility              []             []    Long term care facility/Nursing home              []             [x]    Hospice in Patient      Primary Caregiver:  [x]  No Primary Caregiver  Name of Primary Caregiver: [facility]  Relationship or Primary Caregiver:    []  Spouse/Significant other       []  Natural Child        []  Step child       []  Sibling   []  Parent   []  Friend/Neighbor   []  Community/Sikhism Volunteer   []  Paid help   []  Other (specify):___________  Notes:  Pt currently GIP at [facility]     Family members/Significant others:  Name: Hammad Poole  Relationship: Wife   Phone Number: [phone]  Actively involved in care? [x]  Yes  []  No    Name:  Relationship:  Phone Number:  Actively involved in care? []  Yes  []  No    Name:  Relationship:  Phone Number:  Actively involved in care?   []  Yes  []  No    Social support systems: (select ONE best description)  []  Excellent social support system which includes three or more family members or friends  [x]  Good social support system which includes two or less members or friends  []  [address] support which includes one family member or friend  []  Poor social support; no family members or friends; basically ALONE  Notes: Wife reports support from children.     Emotional Status: (page all that apply)    Patient Caregiver Response                 []                []    Mood/Affect stable and appropriate                   []                []    Angry                 []                []    Anxious                 []                []    Apprehensive                 []                []    Avoidant                 []                []    Clinging                 []                []    Depressed                 []                []    Distraught                 []                []    Elated                 []                []    Euphoric                 []                []    Fearful                 []                []    Flat Affect                 []                []    Helpless                 []                []    Hostile                 []                []    Impulsive                 []                []    Irritable                 []                []    Labile                 []                []    Manic                 []                []    Restlessness                 []                []    Sad                 []                [x]    Suspicious                 []                []    Tearful                 []                []    Withdrawn     Notes:  Pts wife reported feeling stressed over this hospital bill and LCSW provided support. Coping Skills (strengths/weakness):    Patient: Coping Skills (strength/weakness):  Unable to assess    Family/caregiver (strength/weakness): Pts wife reports being accepting.     [place] of care (page all that apply):     [x]  No burden evident   []  Family must administer medications   []  Illness causing financial strain   []  Family/Support feels overwhelmed   []  Family/Support sleep disturbed with patients care   []  Patients care causes extra physical stress  of death  []  Illness causes changes in family lifestyle  []  Illness impacting family/support employment  []  Family experiencing increased time demands  []  Patients behavior endangers family  []  Denial of patients illness  []  Concern over outcome of illness/fear  []  Patients behavior embarrassing to family   Notes:      Risk Factors: (page all that apply):    [x]  No burden evident   []  Alcohol abuse  []  Financial resources inadequate to meet basic needs (food/house/etc)  []  Financial resources inadequate to meet health care needs (supplies/equipment/medications)  []  Food/nutrition resources inadequate  []  Home environment unsafe/inadequate for home care  []  Homicidal risk  []  Lives alone or without concerned relatives  []  Multiple medications/complex schedule  []  Physical limitations increase likelihood of falls  []  Plan of care/treatments complicated  []  Substance use/abuse  []  Suicidal risk  []  Visual impairment threatens safety/ability to perform self-care  []  Other (specify):     Abuse/Neglect (actual/potential risks):  [x]  No signs of abuse/neglect  []  History of abuse/neglect                 []  [id number]          []  Sexual  []  History of domestic violence  []  Lacks adequate physical care  []  Lacks emotional nurturing/support  []  Lacks appropriate stimulation/cognitive experiences  []  Left alone inappropriately  []  Lacks necessary supervision  []  Inadequate or delayed medical care  []  Unsafe environment (i.e guns/drug use/history of violence in the home/etc.)  []  Bruising or other physical signs of injury present  []  Other (specify):  Notes:   []  Refer to child/adult protective services      Current Sources of Stress (in Addition to Current Illness):   []  None reported  [x]  Bills/Debt    []  Career/Job change    []   (short term)    []   (long term)    []  Death of a child (recent)    []  Death of a parent (recent)   []  Death of a spouse (recent)   []  Employment status changed   []  Family discord    []  Financial loss/Inadequate inther (specify):come  []  Job loss  []  Legal issues unresolved  []  Lifestyle change  []  Marital discord  []  Marriage within the last year  []  Paperwork (insurance/legal/etc) overwhelming  []  Separation/Divorce  []  Other (specify):  Notes:  Pts wife reported a lot of stress over a hospital bill she just received and stated she has been on the phone about it for hours today.     Current [employer] Being Utilized     1. None known at this time. Interventions/Plan of Care     1. Assess social and emotional factors related to coping with end of life issues  2. Community resource planning/referral   3. Relocation to different care setting if/when symptoms stabilize  4. SW to provide supportive counseling through the grieving process to pts wife. Discharge Planning     1. Pt appears imminent but SW will be available to assist with discharge planning if pt stabilizes.     MSW Assessment Completed by: Lili Rendon LCSW  07/05/22    Time In:2:30 PM      Time Out:3:00 PM

## 2022-07-06 NOTE — PROGRESS NOTES
1900 Report from [address] [facility] Patient assessment completed. Patient is unresponsive with relaxed facial expression. No nonverbal signs of discomfort. 79 Wife called and this RN updated her on patient's condition. She is grateful for the care he is receiving. Wife is aware patient is nearing end of life, and said if he is still here in the morning she will come. She thought about staying the night but said she is exhausted. She also said they will be using [facility] in [facility] for their services.  Patient given scheduled medication. Patient is unresponsive with unlabored breathing. Patient has temp of 100.2, cool cloth placed on patient's forehead.  Patient unresponsive with shallow respirations.  Patient has absent heart and lung sounds upon auscultation for 2 minutes. Time of death .     1 Wife notified of patient's passing. Wife says she will call her children. Granddaughter also notified per her request. Family appears to be grieving appropriately. They asked if we would wait till 0480  75 before calling  home in case patient's daughter wants to come by.    2345 No family coming by this evening.  home notified. NAME OF PATIENT:  Job Nipper    LEVEL OF CARE:  GIP    REASON FOR GIP:   Unmanageable respiratory distress, Terminal agitation, despite changes to medications, Medication adjustment that must be monitored 24/7 and Stabilizing treatment that cannot take place at home    *PATIENT REMAINS ELIGIBLE FOR GIP LEVEL OF CARE AS EVIDENCED BY: Frequent IV medications and nursing assessment required to manage symptoms.        REASON FOR RESPITE:  n/a    O2 SAFETY:  n/a    FALL INTERVENTIONS PROVIDED:   Implemented/recommended use of non-skid footwear, Implemented/recommended use of fall risk identification flag to all team members, Implemented/recommended resources for alarm system (personal alarm, bed alarm, call bell, etc.) , Implemented/recommended environmental changes (remove hazards, lower bed, improve lighting, etc.) and Implemented/recommended increased supervision/assistance    INTERDISPLINARY COMMUNICATION/COLLABORATION:  Physician, MSW, [place] and RN, CNA    NEW MEDICATION INITIATION DOCUMENTATION:  n/a    Reason medication is being initiated:  n/a    MD / Provider name consulted re: change in status / initiation of new medication:  n/a    New Symptom(s):  n/a    New Order(s):  n/a    Name of the person notified of the changes:  n/a    Name of person being taught:  n/a    Instructions given:  n/a    Side Effects taught:  n/a    Response to teaching:  n/a      COMFORTABLE DYING MEASURE:  Is Patient/family satisfied with symptom level?  yes    DISCHARGE PLAN:  Home when symptoms are managed, pt will likely pass at [facility].

## 2022-07-06 NOTE — [facility] IDG SOCIAL WORKER NOTES
[facility] note:   LCSW visited pt for initial psychosocial assessment. Pt was lethargic and appeared to be in the active stage of dying. Pt had no reports of pain and appeared comfortable. No family at bedside. RN reported pts wife, Brayan Mathews was not feeling well but might be in later today. LCSW called pts wife to offer support and assess needs. Pts wife stated she was very stressed and really could not talk right now. Pts wife stated she had received a large bill from pts hospital stay that insurance was saying was not covered. Pts wife stated she has been back and forth with insurance company and they did state they believe it was a mistake. LCSW confirmed that it ws a bill for hospital and not hospice. LCSW provided support and encouragement for pts wife to call with any needs. Pts wife stated she planned to visit tomorrow. LCSW dicussed importance of self care. Patient diagnosed with dementia approximately 5 years ago.  According to his wife, he used to be very calm, relatively laidback, wanted to be outdoors but that has completely changed over the last several months with being more restless, agitated and now will not go outside at all. Pt has 2 daughters and one son. No Denominational affiliation but raised [facility]. 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 LCSW will attempt to visit with pt and pts wife tomorrow when she visits. LCSW will continue monitor and assess needs. Problem: Deficit Emotional Support   Plan: SW plans to visit weekly for emotional support through the grief process. Community Resources: None at this time.   Advance Directives: Pts wife is LNOK  DDNR: DDNR   Home: [facility]     Singh Toribio, [address]    [phone]

## 2022-07-06 NOTE — HOSPICE
1900:  Report received from [place], [address] [place] [address] Ave:  Pt assessed. Unresponsive. Pale. Has some audible secretions and coarse breath sounds. BP low. Tachycardic. 2000:  Scheduled IV meds given for symptom management. LAC IV site leaking and discontinued. 2120:  Having periods of apnea. Remains unresponsive. 2230:  Continues to have short periods of apnea. Audible secretions remain. PRN dose of IV robinul 0.2 mg and scheduled doses of Morphine and Ativan given IV.  2330:  Continues to have short periods of apnea. Remains gray abd unresponsive. 0012:  Scheduled IV meds given for symptom management. Second IV site, #20 started in left hand. 0115:  Resp even and unlabored. Neutral facial expression. 0211:  Continues to have audibel secretions. PRN dose of Robinul 0.2 mg given IV. Gave scheduled Morphine and lorazepam IV. Attempted to suction back of mouth. Able to obtain a scant amount of white secretions. 4835:  Using accessory muscles to breathe. Loud audible secretions present. PRN dose of Morphine 4 mg given. Was able to suction a moderate amount of frothy white secretions from back of throat.  0405:  Resp less labored. Meds effective. Scheduled IV Morphine and lorazepam given. 0500:  Continues to have some audible secretions. Toes are starting to mottle. 0617:  Scheduled IV Morphine, lorazepam and prn dose of Robinul 0.2mg given. Helped CNA bathe pt. [place] arms and brow furrowed. Resp became faster. Suctioned a moderate amoun of frothy white secretions from throat. 4627:  Using accessory muscles to breathe. Rate rapid. PRN dose of Morphine 4 mg and lorazepam 2 mg given IV. Suctioned a moderate amount of frothy white secretions from throat. Face stas.   0700:  Report given to [employer], RN        NAME OF PATIENT:  Gerardo Ram    LEVEL OF CARE: GIP    REASON FOR GIP:   Terminal agitation, despite changes to medications, Medication adjustment that must be monitored 24/7 and Stabilizing treatment that cannot take place at home    *PATIENT REMAINS ELIGIBLE FOR GIP LEVEL OF CARE AS EVIDENCED BY: Is unable to take po/sl meds for symptom management. Requires IV meds for symptom management. Requires frequent SN assessment and treatment of symptoms. (MUST BE ADDRESSED OF PATIENT GIP)      REASON FOR RESPITE:  N/A    O2 SAFETY:  N/A    FALL INTERVENTIONS PROVIDED:   Implemented/recommended resources for alarm system (personal alarm, bed alarm, call bell, etc.)     INTERDISPLINARY COMMUNICATION/COLLABORATION:  Physician, MSW, [place] and RN, CNA    NEW MEDICATION INITIATION DOCUMENTATION:  N/A    Reason medication is being initiated:  N/A    MD / Provider name consulted re: change in status / initiation of new medication:  N/A    New Symptom(s):  N/A    New Order(s):  N/A    Name of the person notified of the changes:  N/A    Name of person being taught:  N/A    Instructions given:  N/A    Side Effects taught:  N/A    Response to teaching:  N/A      COMFORTABLE DYING MEASURE:  Is Patient/family satisfied with symptom level?  yes    DISCHARGE PLAN:  Probable EOL at [facility]. If symptoms managed will return home.

## 2022-07-06 NOTE — PROGRESS NOTES
Problem: Pressure Injury - Risk of  Goal: *Prevention of pressure injury  Description: Document Blaise Scale and appropriate interventions in the flowsheet. Outcome: Progressing Towards Goal  Note: Pressure Injury Interventions:  Sensory Interventions: Turn and reposition approx. every two hours (pillows and wedges if needed),Pad between skin to skin,Monitor skin under medical devices,Minimize linen layers,Keep linens dry and wrinkle-free,Float heels    Moisture Interventions: Moisture barrier,Maintain skin hydration (lotion/cream),Check for incontinence Q2 hours and as needed,Apply protective barrier, creams and emollients,Absorbent underpads    Activity Interventions: Pressure redistribution bed/mattress(bed type)    Mobility Interventions: Float heels,HOB 30 degrees or less,Pressure redistribution bed/mattress (bed type)    Nutrition Interventions:  (pt unrepsonsive npo)    Friction and Shear Interventions: Feet elevated on foot rest,Apply protective barrier, creams and emollients,HOB 30 degrees or less,Transferring/repositioning devices                Problem: Falls - Risk of  Goal: *Absence of Falls  Description: Document Bobby Fall Risk and appropriate interventions in the flowsheet.   Outcome: Progressing Towards Goal  Note: Fall Risk Interventions:  Mobility Interventions: Bed/chair exit alarm    Mentation Interventions: Adequate sleep, hydration, pain control,Bed/chair exit alarm,Door open when patient unattended,More frequent rounding,Room close to nurse's station    Medication Interventions: Bed/chair exit alarm    Elimination Interventions: Bed/chair exit alarm,Call light in reach    History of Falls Interventions: Bed/chair exit alarm,Door open when patient unattended,Room close to nurse's station         Problem: Potential for Alteration in Skin Integrity  Goal: Monitor skin for areas of alteration in skin integrity  Description: Patient/family/caregiver will demonstrate ability to care for patient's skin, monitor for areas of breakdown, and demonstrate methods to prevent breakdown during hospice care. Outcome: Progressing Towards Goal     Problem: Risk for Falls  Goal: Free of falls during inpatient stay  Description: Patient will be free of falls during inpatient stay. Outcome: Progressing Towards Goal     Problem: Pain  Goal: Assess satisfaction of level of comfort and symptom control  Outcome: Progressing Towards Goal  Goal: *Control of acute pain  Outcome: Progressing Towards Goal     Problem: Anxiety/Agitation  Goal: Verbalize or staff assess the ability to manage anxiety  Description: The patient/family/caregiver will verbalize and demonstrate ability to manage the patient's anxiety throughout hospice care. Outcome: Progressing Towards Goal     Problem: Pressure Injury - Risk of  Goal: *Prevention of pressure injury  Outcome: Progressing Towards Goal  Note: Pressure Injury Interventions:  Sensory Interventions: Turn and reposition approx.  every two hours (pillows and wedges if needed),Pad between skin to skin,Monitor skin under medical devices,Minimize linen layers,Keep linens dry and wrinkle-free,Float heels    Moisture Interventions: Moisture barrier,Maintain skin hydration (lotion/cream),Check for incontinence Q2 hours and as needed,Apply protective barrier, creams and emollients,Absorbent underpads    Activity Interventions: Pressure redistribution bed/mattress(bed type)    Mobility Interventions: Float heels,HOB 30 degrees or less,Pressure redistribution bed/mattress (bed type)    Nutrition Interventions:  (pt unrepsonsive npo)    Friction and Shear Interventions: Feet elevated on foot rest,Apply protective barrier, creams and emollients,HOB 30 degrees or less,Transferring/repositioning devices

## 2022-07-06 NOTE — PROGRESS NOTES
[facility] Help to Those in Need  [phone]    Patient Name: Jose De La Rosa  YOB: 1940    Date of Provider Hospice Visit: 07/06/22    Level of Care:   [x] General Inpatient (GIP)    [] Routine   [] Respite    Current Location of Care:  [] [facility] [] [facility] [] [facility] [] [facility] [x] [facility], patient referred from:  [] [facility] [x] [facility] [] [facility] [] [facility] [] Home [] Other:     Date of [facility] Admission: 7/3/2022  Hospice Medical Director at time of admission: [email or web address] [email or web address] Diagnosis: Metabolic encephalopathy  Diagnoses RELATED to the terminal prognosis: End-stage dementia  Other Diagnoses:      HOSPICE SUMMARY     Jose De La Rosa is a 79y.o. year old who was admitted to [address] [place] [address]. Patient is an 58-year-old male with essentially end-stage dementialikely Alzheimer's type who presents to the hospital with increasing agitation, confusion, delirium, encephalopathy. His wife states he really has not allowed her even to basic hygiene in the home setting. According to his wife, patient has had a fairly rapid decline over the last couple weeks with decreased p.o. intake, agitation, generalized weakness, aggressiveness at times. Work-up in the emergency room including CT scan of the head was unremarkable, UA was unremarkable, blood work to include TSH and ammonia levels were unremarkable. This appeared to be significant progression of his underlying dementia with metabolic encephalopathy. Patient has struggled taking any type of oral medication, not eating, climbing out of bed at times and this appears to be appropriate for GIP level care secondary to symptom burden. Family initially had investigated transition to a nursing home with hospice care but given symptom burden, we transitioned him to the community hospice house for GIP level care. Patient diagnosed with dementia approximately 5 years ago.   According to his wife, he used to be very calm, relatively laidback, wanted to be outdoors but that has completely changed over the last several months with being more restless, agitated and now will not go outside at all. She has had to be by his side 24/7 and also is completely exhausted being his caregiver    The patient's principle diagnosis has resulted in restlessness and agitation  Refer to LCD     Functionally, the patient's Karnofsky and/or Palliative Performance Scale has declined over a period of weeks to months and is estimated at 10-20. The patient is dependent on the following ADLs: All    Objective information that support this patients limited prognosis includes: See above note    The patient/family chose comfort measures with the support of Hospice. HOSPICE DIAGNOSES   Active Symptoms:  1. Metabolic encephalopathy  2. 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 Restlessness and agitation  4. Hospice care  5. SOB     PLAN   1. Patient will continue GIP level care as he needs frequent nursing assessments, IV medication management, not safe to attempt sublingual/oral medication. Patient still with some increased WOB and needed several doses of prn morphine. Still with increased respiratory distress this morning   2. Continue Ativan 2 mg IV every 2 hours scheduled every 15 minutes as needed for restlessness and agitation  3. Change opioid to dilaudid 2 mg IV every 2 scheduled and every 15 min prn  4. Comfort meds for all other symptoms  5. Plan reviewed with bedside nursing team  6. Will update his wife who could not visit yesterday    7.  and SW to support family needs  8. Disposition: Anticipate death at the hospice house  9. Hospice Plan of care was reviewed in detail and agree with current plan of care    Prognosis estimated based on 07/06/22 clinical assessment is:   [x] Hours to Days possibly  [] Days to Weeks    [] Other:    Communicated plan of care with: Hospice Case Manager;  Hospice IDT; Care Team     GOALS OF CARE Patient/Medical POA stated Goal of Care: Hospice care    [x] I have reviewed and/or updated ACP information in the Advance Care Planning Navigator. This information is available in the [address] link in the patient's chart header. Primary Decision Maker ([address]):   Primary Decision Maker (Active): Harriet Lloyd - Spouse - [phone]    Resuscitation Status: DNR  If DNR is there a Durable DNR on file? : [x] Yes [] No (If no, complete Durable DNR)    HISTORY     History obtained from: Chart, spouse, hospice liaison    CHIEF COMPLAINT: Restlessness  The patient is:   [] Verbal  [] Nonverbal  [x] Unresponsive    HPI/SUBJECTIVE: Patient said a few words to [facility] nurse when he arrived. He is resting when I entered the room. 7/5-chart reviewed from the last 24 hours. Patient is minimally responsive but does show some evidence of increased work of breathing    7/6-patient is unresponsive.  Shows evidence of increased WOB       REVIEW OF SYSTEMS     The following systems were: [] reviewed  [x] unable to be reviewed    Positive ROS include:  Constitutional: fatigue, weakness, in pain, short of breath  Ears/nose/mouth/throat: increased airway secretions  Respiratory:shortness of breath, wheezing  Gastrointestinal:poor appetite, nausea, vomiting, abdominal pain, constipation, diarrhea  Musculoskeletal:pain, deformities, swelling legs  Neurologic:confusion, hallucinations, weakness  Psychiatric:anxiety, feeling depressed, poor sleep  Endocrine:     Adult Non-Verbal Pain Assessment Score: 3    Face  [] 0   No particular expression or smile  [x] 1   Occasional grimace, tearing, frowning, wrinkled forehead  [] 2   Frequent grimace, tearing, frowning, wrinkled forehead    Activity (movement)  [] 0   Lying quietly, normal position  [x] 1   Seeking attention through movement or slow, cautious movement  [] 2   Restless, excessive activity and/or withdrawal reflexes    Guarding  [x] 0   Lying quietly, no positioning of hands over areas of body  [] 1   Splinting areas of the body, tense  [] 2   Rigid, stiff    Physiology (vital signs)  [x] 0   Stable vital signs  [] 1   Change in any of the following: SBP > 20mm Hg; HR > 20/minute  [] 2   Change in any of the following: SBP > 30mm Hg; HR > 25/minute    Respiratory  [] 0   Baseline RR/SpO2, compliant with ventilator  [x] 1   RR > 10 above baseline, or 5% drop SpO2, mild asynchrony with ventilator  [] 2   RR > 20 above baseline, or 10% drop SpO2, asynchrony with ventilator     FUNCTIONAL ASSESSMENT     Palliative Performance Scale (PPS): 10       PSYCHOSOCIAL/SPIRITUAL ASSESSMENT     Active Problems:    * No active hospital problems.  *    Past Medical History:   Diagnosis Date    Skin cancer       Past Surgical History:   Procedure Laterality Date    HX MOHS PROCEDURES  03/27/2018    BCC R scaphoid fossa by Dr. Joanna Menendez History     Tobacco Use    Smoking status: Former Smoker    Smokeless tobacco: Never Used   Substance Use Topics    Alcohol use: Not on file     Family History   Problem Relation Age of Onset    Cancer Mother       No Known Allergies   Current Facility-Administered Medications   Medication Dose Route Frequency    HYDROmorphone (DILAUDID) injection 2 mg  2 mg IntraVENous Q2H    HYDROmorphone (DILAUDID) injection 2 mg  2 mg IntraVENous Q15MIN PRN    glycopyrrolate (ROBINUL) injection 0.2 mg  0.2 mg IntraVENous Q4H PRN    ziprasidone (GEODON) 20 mg in sterile water (preservative free) 1 mL injection  20 mg IntraMUSCular Q12H PRN    LORazepam (ATIVAN) injection 2 mg  2 mg IntraVENous Q15MIN PRN    morphine injection 4 mg  4 mg IntraVENous Q15MIN PRN    LORazepam (ATIVAN) injection 2 mg  2 mg IntraVENous Q2H    bisacodyL (DULCOLAX) suppository 10 mg  10 mg Rectal DAILY PRN    haloperidol (HALDOL) 2 mg/mL oral solution 2 mg  2 mg SubLINGual Q4H PRN    LORazepam (INTENSOL) 2 mg/mL oral concentrate 1 mg  1 mg Oral Q1H PRN  acetaminophen (TYLENOL) suppository 650 mg  650 mg Rectal Q6H PRN    haloperidol (HALDOL) 2 mg/mL oral solution 2 mg  2 mg Oral Q4H PRN        PHYSICAL EXAM     Wt Readings from Last 3 Encounters:   07/03/22 93.4 kg (206 lb)   06/28/22 93.4 kg (206 lb)   03/27/18 104.3 kg (230 lb)       Visit Vitals  BP (!) 78/44 (BP 1 Location: Right upper arm, BP Patient Position: Lying left side)   Pulse (!) 49   Temp (!) 101.1 °F (38.4 °C)   Resp 17   Ht 6' 1\" (1.854 m)   Wt 93.4 kg (206 lb)   SpO2 (!) 63%   BMI 27.18 kg/m²       Supplemental O2  [] Yes  [x] NO  Last bowel movement:     Currently this patient has:  [] Peripheral IV [] PICC  [] PORT [] ICD    [] Rubio Catheter [] NG Tube   [] PEG Tube    [] Rectal Tube [] Drain  [] Other:     Constitutional: Unresponsive, still mild increased WOB and accessory muscle use  Eyes: Closed  ENMT: Slightly dry  Cardiovascular: Irregular regular  Respiratory: mild work of breathing, mild accessory muscle use, secretions noted  Gastrointestinal: Soft  Musculoskeletal: Muscle wasting  Skin: Unremarkable  Neurologic: Nonfocal  Psychiatric: Calm right now  Other:       Pertinent Lab and or Imaging Tests:  Lab Results   Component Value Date/Time    Sodium 141 07/02/2022 04:25 AM    Potassium 3.2 (L) 07/02/2022 04:25 AM    Chloride 105 07/02/2022 04:25 AM    CO2 28 07/02/2022 04:25 AM    Anion gap 8 07/02/2022 04:25 AM    Glucose 126 (H) 07/02/2022 04:25 AM    BUN 13 07/02/2022 04:25 AM    Creatinine 0.58 (L) 07/02/2022 04:25 AM    BUN/Creatinine ratio 22 (H) 07/02/2022 04:25 AM    GFR est AA >60 07/02/2022 04:25 AM    GFR est non-AA >60 07/02/2022 04:25 AM    Calcium 8.6 07/02/2022 04:25 AM     Lab Results   Component Value Date/Time    Protein, total 6.5 06/23/2022 10:44 PM    Albumin 3.5 06/23/2022 10:44 PM           Total time:   Counseling / coordination time:   > 50% counseling / coordination?:

## 2022-07-06 NOTE — PROGRESS NOTES
0700: Report received from Vahid Liz [facility]: Patient resting in bed with eyes closed. Patient is not responsive during assessment. Respirations are unlabored, irregular pattern with intermittent apnea lasting 5-6 seconds. Lungs are coarse bilaterally with audible secretions at the bedside. Bowel sounds are absent. Radial and pedal pulses are palpable. 0800: Patient is resting quietly with eyes closed, neutral facial position observed. Respirations are regular depth, irregular rhythm, with periods of apnea lasting 8-9 seconds. Scheduled lorazepam 2mg and morphine 4mg IV administered. Called and spoke with patient's wife, update provided,  she is going to be coming to [facility] later this morning. 0900: Rounds with Dr. Vivi Vasquez, patient continues to meet GIP level of care, requiring IV medications and frequent nursing assessment for management of symptoms. Patient's respiratory effort continues to increase requiring PRN medication for management. Order received to discontinue morphine and start hydromorphone 2mg IV q 2 hours and q 15 min PRN pain or dyspnea. 1000:   Patient is resting quietly with eyes closed, neutral facial position observed. Respirations are regular depth, irregular rhythm, with periods of apnea lasting 8-10 seconds. Scheduled lorazepam 2mg and hydromorphone 2mg IV administered. 1045: Patient's respiratory effort has increased. PRN hydromorphone 2mg IV administered. Patient's wife and daughter arrived to the bedside. Provided update, both appear to be accepting of patient's prognosis and express appreciation for our care. 1115: Patient's respiratory effort has decreased, minimal accessory muscle use observed  1155: Scheduled hydromorphone 2mg and lorazepam 2mg IV administered. Patient having cheyne-canada respirations with apnea lasting 10 seconds. Family remains at the bedside. 1300: Patient remains resting with eyes closed, unresponsive.    Patient continues tohave cheyne-canada respirations with apnea lasting 10 seconds. 1400: Patient resting with eyes closed, unresponsive to stimuli. Respirations are mildly labored with apnea lasting 10 seconds. Scheduled hydromorphone 2mg and lorazepam 2mg IV administered. 1500: Patient resting with eyes closed, unresponsive to stimuli. Respirations are unlabored with apnea lasting 10 seconds. 1600: Patient's wife leaving for the evening, she understands that patient may die overnight, but she verbalizes that her  would want her to take care of herself, her daughter and granddaughter are supportive. Patient is resting with eyes closed, unresponsive respirations are mildly labored with apnea lasting 9-10 seconds. Scheduled hydromorphone 2mg and lorazepam 2mg IV administered. Repositioned patient with pillows for offloading with [facility]. 1700:  Patient remains resting with eyes closed, unresponsive. Patient having cheyne-canada respirations with apnea lasting 10 seconds. 1815: Scheduled hydromorphone 2mg and lorazepam 2mg IV administered. Patient has secretions that are audible at the bedside. Continues to have 10 second apnea.    1900: Report given to Naheed Dueñas RN                           NAME OF PATIENT:  Gerardo Ram    LEVEL OF CARE:  GIP    REASON FOR GIP:   Terminal agitation, despite changes to medications, Medication adjustment that must be monitored 24/7 and Stabilizing treatment that cannot take place at home    *PATIENT REMAINS ELIGIBLE FOR GIP LEVEL OF CARE AS EVIDENCED BY: (MUST BE ADDRESSED OF PATIENT GIP)      REASON FOR RESPITE:  NA    O2 SAFETY:  na    FALL INTERVENTIONS PROVIDED:   Implemented/recommended use of fall risk identification flag to all team members and Implemented/recommended resources for alarm system (personal alarm, bed alarm, call bell, etc.)     INTERDISPLINARY COMMUNICATION/COLLABORATION:  Physician, MSW, [place] and RN, CNA    NEW MEDICATION INITIATION DOCUMENTATION:  Documentation completed in Clinical Note in [facility]    Reason medication is being initiated: Increased labored breathing    MD / Provider name consulted re: change in status / initiation of new medication:  Dr. Andre Essex Symptom(s):  Increased respiratory effort     New Order(s):  D/C morphine. Hydromorphone 2mg IV q 4 hours and q 15 min PRN pain or dyspnea.     Name of the person notified of the changes:  Sherri Ortiz    Name of person being taught:  Sherri Ortiz    Instructions given:  YEs    Side Effects taught:  yes    Response to teaching:  Verbalized understanding      [address] [place] [address]:  Is Patient/family satisfied with symptom level?  yes    DISCHARGE PLAN:  Home, but patient is likely to  at [facility]

## 2022-07-06 NOTE — PROGRESS NOTES
[address] [place] [address]       Pharmacist monitoring provided for this patient at [address] [place] [address] Way List      Admitting dianosis treated appropriately : YES     Nausea/Vomiting controlled: YES     Pain Controlled: YES     Agitation/Anxiety/ Delirium controlled: YES     Depression controlled: YES     Secretions controlled : YES     Constipation/Bowel routine controlled: YES     SOB/ Dyspnea controlled: YES     Insomnia: YES     Thank you,        Natalio Vega, PharmD, BCPS

## 2022-07-06 NOTE — PROGRESS NOTES
Physician Progress Note      Pam North  CSN #:                  [id number]  :                       1940  ADMIT DATE:       2022 9:49 PM  [address] [place] [facility] DATE:        7/3/2022 12:56 PM  RESPONDING  PROVIDER #:        Nathan Issa MD          QUERY TEXT:    Good afternoon. Pt admitted with dementia with delirium and has moderate malnutrition documented in RD note from . Please further specify type of malnutrition with documentation in the medical record. The medical record reflects the following:  Risk Factors: Dementia, HTN  Clinical Indicators: Per RD note from --\"Moderate malnutrition related to cognitive or neurological impairment,inadequate protein-energy intake as evidenced by intake 0-25%,weight loss,Criteria as identified in malnutrition assessment,moderate muscle loss,moderate loss of subcutaneous fat, wt appears down by 10% from last record from 4 years ago and pt is not alert to accept PO for most daytime hours. \";  Treatment: labs, vital signs per unit protocol, RD consult    Thank you,  Lukas Hernandez RN, [facility]  [phone]    ASPEN Criteria:  https://aspenjournals. onlinelibrary. [place]. com/doi/full/10.1177/0946320259162269  Options provided:  -- Mild Protein calorie malnutrition  -- Moderate Protein calorie malnutrition  -- Severe Protein calorie malnutrition  -- Other - I will add my own diagnosis  -- Disagree - Not applicable / Not valid  -- Disagree - Clinically unable to determine / Unknown  -- Refer to Clinical Documentation Reviewer    PROVIDER RESPONSE TEXT:    This patient has moderate protein calorie malnutrition. Query created by: Klaudia Perea on 2022 3:59 PM      QUERY TEXT:    Good afternoon. Patient admitted with dementia and agitation. Documentation reflects acute encephalopathy in Neurology consult note dated . If possible, please document in the progress notes and discharge summary if encephalopathy was:     The medical record reflects the following:  Risk Factors: Dementia with increased agitation, HTN, skin cancer  Clinical Indicators: per Neurology consult note from  6/26 \"  Consideration includes acute encephalopathy due to sundowning (did not sleep last night) in a patient with baseline advanced dementia\"; EEG result \"Diffuse slowing and disorganization indicative of an encephalopathy and may also be seen in chronic neurodegenerative disorder such as dementia\"  Treatment: Labs, CT, EEG, Neuro consult, vital signs per unit protocol    Thank you,  Mary Higgins RN, [facility]  [phone]  Options provided:  -- Acute encephalopathy confirmed after study  -- Acute encephalopathy treated and resolved  -- Acute encephalopathy ruled out after study  -- Other - I will add my own diagnosis  -- Disagree - Not applicable / Not valid  -- Disagree - Clinically unable to determine / Unknown  -- Refer to Clinical Documentation Reviewer    PROVIDER RESPONSE TEXT:    Acute encephalopathy was ruled out after study.     Query created by: Alysia Sánchez on 7/5/2022 4:29 PM      Electronically signed by:  Marti Gamez MD 7/6/2022 4:47 PM

## 2022-07-06 NOTE — PROGRESS NOTES
Problem: Pressure Injury - Risk of  Goal: *Prevention of pressure injury  Description: Document Blaise Scale and appropriate interventions in the flowsheet. Outcome: Progressing Towards Goal  Note: Pressure Injury Interventions:  Sensory Interventions: Assess changes in LOC    Moisture Interventions: Minimize layers    Activity Interventions: Pressure redistribution bed/mattress(bed type)    Mobility Interventions: HOB 30 degrees or less    Nutrition Interventions:  (npo)    Friction and Shear Interventions: Apply protective barrier, creams and emollients                Problem: Patient Education: Go to Patient Education Activity  Goal: Patient/Family Education  Outcome: Progressing Towards Goal     Problem: Falls - Risk of  Goal: *Absence of Falls  Description: Document Bobby Fall Risk and appropriate interventions in the flowsheet. Outcome: Progressing Towards Goal  Note: Fall Risk Interventions:  Mobility Interventions: Bed/chair exit alarm    Mentation Interventions: Adequate sleep, hydration, pain control    Medication Interventions: Bed/chair exit alarm    Elimination Interventions: Bed/chair exit alarm    History of Falls Interventions: Bed/chair exit alarm         Problem: Patient Education: Go to Patient Education Activity  Goal: Patient/Family Education  Outcome: Progressing Towards Goal     Problem: Hospice Orientation  Goal: Demonstrate understanding of hospice philosophy, plan of care, and home hospice program  Description: The patient/family/caregiver will demonstrate understanding of hospice philosophy, plan of care and the home hospice program as evidenced by participation in meeting the patient's psychosocial, spiritual, medical, and physical needs inclusive of medical supplies/equipment focusing on symptoms.   Outcome: Progressing Towards Goal     Problem: Potential for Alteration in Skin Integrity  Goal: Monitor skin for areas of alteration in skin integrity  Description: Patient/family/caregiver will demonstrate ability to care for patient's skin, monitor for areas of breakdown, and demonstrate methods to prevent breakdown during hospice care. Outcome: Progressing Towards Goal     Problem: Alteration in Mobility  Goal: Remain as independent as possible and remain safe in environment  Description: Patient will remain as independent as possible and remain safe in their environment. Outcome: Progressing Towards Goal     Problem: Risk for Falls  Goal: Free of falls during inpatient stay  Description: Patient will be free of falls during inpatient stay. Outcome: Progressing Towards Goal     Problem: Pain  Goal: Assess satisfaction of level of comfort and symptom control  Outcome: Progressing Towards Goal  Goal: *Control of acute pain  Outcome: Progressing Towards Goal     Problem: Anticipatory Grief  Goal: Explore reactions to and verbalize acceptance of impending loss  Description: Patient/family/caregiver will explore reactions to and verbalize acceptance of impending loss. Outcome: Progressing Towards Goal     Problem: Anxiety/Agitation  Goal: Verbalize or staff assess the ability to manage anxiety  Description: The patient/family/caregiver will verbalize and demonstrate ability to manage the patient's anxiety throughout hospice care. Outcome: Progressing Towards Goal     Problem: Communication Deficit  Goal: Effectively communicate symptoms, needs, and concerns  Description: Patient/family/caregiver will effectively communicate symptoms, needs and concerns. Outcome: Progressing Towards Goal     Problem: Pressure Injury - Risk of  Goal: *Prevention of pressure injury  Outcome: Progressing Towards Goal     Problem: Spiritual Evaluation  Goal: Identify beliefs/practices that support hospice experience  Description: Patient/family identify their beliefs/practices that impair Hospice experience. Patient/family identify their beliefs/practices that support Hospice experience. Patient coping identified. Spiritual distress identified and decreased with visit.   Outcome: Progressing Towards Goal

## 2022-07-06 NOTE — PROGRESS NOTES
LCSW met with pt, pts wife, pts daughter and pts DIANA to provide support and assess needs. Pts wife and family reported coping ok and accepting. Pts wife had just received a copy of GFMS. Pts wife reported coping ok and accepting. Pts wife understood pt was nearing EOL. Pts wife reported being  for 58 yrs and talked about hardships of CG for pt at times. Pts wife reported additional stress from a bill she received from her insurance for pts recent stay at [facility]. LCSW called [facility] billing and they stated they just put in a appeal to [facility] and stated that pts wife should follow up in 30 days if she does not hear anything. LCSW relayed this information to pts wife and she appeared relieved. Pts daughter brought up [place] arrangements. Pts wife stated she [facility] but was unsure. LCSW offered to provide a list of FH's but pts wife declined stating she was familiar with FH's in the area. LCSW provided supportive counseling to pts family. LCSW will continue to monitor and assess needs. Alexia Forbes LCSW  Clinical Supervisor of Psychosocial Services  [facility].

## 2022-07-07 ENCOUNTER — HOME CARE VISIT (OUTPATIENT)
Dept: HOSPICE | Facility: HOSPICE | Age: 82
End: 2022-07-07
Payer: MEDICARE

## 2022-07-07 NOTE — HOSPICE
[address] [facility] Help to Those in Need  [phone]    Social Work Admission Note  Patient Name: Philippe Villafuerte  YOB: 1940  Age: 79 y.o. Date of Visit: 07/06/22  Facility of Care: [facility]  Patient Room: 08/01     Hospice Attending: Stormy Thornton MD  Hospice Diagnosis: Acute Metabolic Encephalopathy    Level of Care:    [x]  GIP    []  Respite   []  Routine    NARRATIVE   LCSW visited pt for initial psychosocial assessment. Pt was lethargic and appeared to be in the active stage of dying. Pt had no reports of pain and appeared comfortable. No family at bedside. RN reported pts wife, Madelon Leventhal was not feeling well but might be in later today. LCSW called pts wife to offer support and assess needs. Pts wife stated she was very stressed and really could not talk right now. Pts wife stated she had received a large bill from pts hospital stay that insurance was saying was not covered. Pts wife stated she has been back and forth with insurance company and they did state they believe it was a mistake. LCSW confirmed that it ws a bill for hospital and not hospice. LCSW provided support and encouragement for pts wife to call with any needs. Pts wife stated she planned to visit tomorrow. LCSW dicussed importance of self care. Patient diagnosed with dementia approximately 5 years ago.  According to his wife, he used to be very calm, relatively laidback, wanted to be outdoors but that has completely changed over the last several months with being more restless, agitated and now will not go outside at all. Pt has 2 daughters and one son. No Confucianism affiliation but raised Gnosticism.  Pt is a former salesman. LCSW will attempt to visit with pt and pts wife tomorrow when she visits.  LCSW will continue monitor and assess needs.     ADVANCE CARE PLANNING    Code Status: DNR  Durable DNR: X_ Yes  _ No  Advance Care Planning 6/30/2022   Patient's Healthcare Decision Maker is: Legal Next of Kin Relationship Status:  []  Single     [x]        []      []  Domestic Partner     []  /  []  Common Law  []    []  Unknown    If in a relationship, name of partner/spouse:Harriet  Duration of relationship:62 yrs     Mormonism: Anabaptism     Home:  in [address] Provided: None at this time. Social Work Initial Assessment     Gender:  male    Race/Ethnicity: (page all that apply)  []  [place] [facility] or [place] Native  []    []  Black or [place] American  []   or   []   or [place]  [x]  Sushil Mangle  []  Unknown      Service:    []  Yes   [x]  No       []  Unknown  Appropriate for Pinning Ceremony:   []  Yes      [x]  No  Is patient using VA benefits?    []  Yes      [x]  No     Primary Language: English  []   Needed  []   utilized during visit    Ability to express thoughts/needs/feelings  []  Expressed thoughts/feelings/needs without difficulty  []  Requires extra time and cuing  []  Speech limited single words  []  Uses only gestures (eye, blinking eye or head movement/pointing)  []  Unable to express thoughts/feelings/needs (speech unintelligible or inappropriate)  [x]  Unresponsive  Notes:  Pt appears to be active stage of dying.     Mental Status:  []  Alert-oriented to:     []  Person     []  Place     []  Time  [x]  Comatose-responds to:    []   Verbal stimuli    []  Tactile stimuli    []  Painful stimuli  []  Forgetful  []  Disoriented/Confused  [x]  Lethargic  []  Agitated  []  Other (specify):    Notes: Pt appeared to be in active stage of dying.     Patients description of Illness/Current Health Status:    []  Patient unable to discuss  []  Patient unwilling to discuss  []  (Specify)        Knowledge/Understanding of Disease Process  Patient:    [x]  Demonstrates knowledge/understanding of disease process   [x]  Demonstrates knowledge/understanding of treatment plan   [x] Demonstrates knowledge/understanding of prognosis   [x]  Demonstrates acceptance of prognosis   [x]  Demonstrates knowledge/understanding of resuscitation status   []  Other (specify)  Caregiver:   [x]  Demonstrates knowledge/understanding of disease process   [x]  Demonstrates knowledge/understanding of treatment plan   [x]  Demonstrates knowledge/understanding of prognosis   [x]  Demonstrates acceptance of prognosis   [x]  Demonstrates knowledge/understanding of resuscitation status   []  Other (specify)  Notes:  Pts wife appears accepting     Patients living arrangement/care setting:  Use the PRIOR COLUMN when the PATIENTS current health status necessitated a change in his/her primary residence. Prior Current Response              [x]             []    Patients own home/residence              []             []    Home of family member/friend              []             []    Boarding home              []             []    Assisted living facility/senior living center              []             []    Hospital/Acute care facility              []             []    Skilled nursing facility              []             []    Long term care facility/Nursing home              []             [x]    Hospice in Patient      Primary Caregiver:  [x]  No Primary Caregiver  Name of Primary Caregiver:[facility]  Relationship or Primary Caregiver:    []  Spouse/Significant other       []  Natural Child        []  Step child       []  Sibling   []  Parent   []  Friend/Neighbor   []  Community/Catholic Volunteer   []  Paid help   [x]  Other (specify):_[facility]__________  Notes:  Currently GIP at [facility]     Family members/Significant others:  Name:Harriet Lloyd  Relationship: Wife   Phone Number:[phone]  Actively involved in care? [x]  Yes  []  No    Name:  Relationship:  Phone Number:  Actively involved in care? []  Yes  []  No    Name:  Relationship:  Phone Number:  Actively involved in care?   []  Yes  []  No    Social support systems: (select ONE best description)  []  Excellent social support system which includes three or more family members or friends  [x]  Good social support system which includes two or less members or friends  []  [address] support which includes one family member or friend  []  Poor social support; no family members or friends; basically ALONE  Notes:  Reports good support from daughter and sister.     Emotional Status: (page all that apply)    Patient Caregiver Response                 []                []    Mood/Affect stable and appropriate                   []                []    Angry                 []                []    Anxious                 []                []    Apprehensive                 []                []    Avoidant                 []                []    Clinging                 []                []    Depressed                 []                []    Distraught                 []                []    Elated                 []                []    Euphoric                 []                []    Fearful                 []                []    Flat Affect                 []                []    Helpless                 []                []    Hostile                 []                []    Impulsive                 []                []    Irritable                 []                []    Labile                 []                []    Manic                 []                []    Restlessness                 []                [x]    Sad                 []                []    Suspicious                 []                []    Tearful                 []                []    Withdrawn     Notes:     Coping Skills (strengths/weakness):    Patient: Coping Skills (strength/weakness):  Unable to assess   Family/caregiver (strength/weakness): Pts wife sounded accepting.     [place] of care (page all that apply):     [x]  No burden evident   []  Family must administer medications   []  Illness causing financial strain   []  Family/Support feels overwhelmed   []  Family/Support sleep disturbed with patients care   []  Patients care causes extra physical stress  of death  []  Illness causes changes in family lifestyle  []  Illness impacting family/support employment  []  Family experiencing increased time demands  []  Patients behavior endangers family  []  Denial of patients illness  []  Concern over outcome of illness/fear  []  Patients behavior embarrassing to family   Notes:      Risk Factors: (page all that apply):    [x]  No burden evident   []  Alcohol abuse  []  Financial resources inadequate to meet basic needs (food/house/etc)  []  Financial resources inadequate to meet health care needs (supplies/equipment/medications)  []  Food/nutrition resources inadequate  []  Home environment unsafe/inadequate for home care  []  Homicidal risk  []  Lives alone or without concerned relatives  []  Multiple medications/complex schedule  []  Physical limitations increase likelihood of falls  []  Plan of care/treatments complicated  []  Substance use/abuse  []  Suicidal risk  []  Visual impairment threatens safety/ability to perform self-care  []  Other (specify):     Abuse/Neglect (actual/potential risks):  [x]  No signs of abuse/neglect  []  History of abuse/neglect                 []  KHEUQGBY          []  Sexual  []  History of domestic violence  []  Lacks adequate physical care  []  Lacks emotional nurturing/support  []  Lacks appropriate stimulation/cognitive experiences  []  Left alone inappropriately  []  Lacks necessary supervision  []  Inadequate or delayed medical care  []  Unsafe environment (i.e guns/drug use/history of violence in the home/etc.)  []  Bruising or other physical signs of injury present  []  Other (specify):  Notes:   []  Refer to child/adult protective services      Current Sources of Stress (in Addition to Current Illness):   [x]  None reported  []  Bills/Debt    []  Career/Job change    []   (short term)    []   (long term)    []  Death of a child (recent)    []  Death of a parent (recent)   []  Death of a spouse (recent)   []  Employment status changed   []  Family discord    []  Financial loss/Inadequate inther (specify):come  []  Job loss  []  Legal issues unresolved  []  Lifestyle change  []  Marital discord  []  Marriage within the last year  []  Paperwork (insurance/legal/etc) overwhelming  []  Separation/Divorce  []  Other (specify):  Notes:      Current [employer] Being Utilized     1. None known at this time. Interventions/Plan of Care     1. Assess social and emotional factors related to coping with end of life issues  2. Community resource planning/referral   3. Relocation to different care setting if/when symptoms stabilize  4. SW will continue to provide supportive counseling through grief process. Discharge Planning     1. SW will assist with d/c planning if pt stabilizes.     MSW Assessment Completed by: Vel Rivas LCSW  07/06/22    Time In:10:30 AM     Time Out:11:00 AM

## 2023-09-06 NOTE — TELEPHONE ENCOUNTER
Home Care Instructions for Gastroscopy with Sedation    Diet:  - Resume your regular diet as tolerated unless otherwise instructed. - Start with light meals to minimize bloating.  - Do not drink alcohol today. Medication:  - If you have questions about resuming your normal medications, please contact your Primary Care Physician. Activities:  - Take it easy today. Do not return to work today. - Do not drive today. - Do not operate any machinery today (including kitchen equipment). Gastroscopy:  - You may have a sore throat for 2-3 days following the exam. This is normal. Gargling with warm salt water (1/2 tsp salt to 1 glass warm water) or using throat lozenges will help. - If you experience any sharp pain in your neck, abdomen or chest, vomiting of blood, oral temperature over 100 degrees Fahrenheit, light-headedness or dizziness, or any other problems, contact your doctor. **If unable to reach your doctor, please go to the [facility] Emergency Room**    - Your referring physician will receive a full report of your examination.  - If you do not hear from your doctor's office within two weeks of your biopsy, please call them for your results. You may be able to see your laboratory results in [facility] between 4 and 7 business days. In some cases, your physician may not have viewed the results before they are released to [address] If you have questions regarding your results contact the physician who ordered the test/exam by phone or via [address] by choosing \"Ask a Medical Question. \" Mohs Pre-Op Assessment    Patient Appointment Date: March 27th @ 10:30    Jerry Vila, 65 y.o., male      does confirm site:Right ear  does ID site. (Can they still visibly see the site)  does not have Hepatitis C   does not have HIV (If YES, set up consult appointment)    Allergies:  No Known Allergies    does not have an Electrical Implanted Device (Pacemaker, AICD, brain stimulator, etc.)    does not need antibiotics    is not taking NSAIDs    is not taking aspirin      is not taking Garlic  is not taking Ginkgo  is not taking Ginseng  is taking Fish oils  is not taking Vit E    does not take a blood thinner(i.e. Coumadin/Warfarin, Plavix, Brilinta, Pradaxa, Xarelto, Effient)  If taking Coumadin needs to have PT/INR drawn and faxed results within a week of surgery    Pre operative assessment questions asked to patient. Patient has a general understanding of the procedure, and has been versed that there will be local anesthesia used in the procedure and that He will be ok to drive themselves to and from the appointment. Patient has been notified to arrive 15-20 minutes early and they may eat or drink before arriving.

## 2025-05-19 NOTE — PROGRESS NOTES
Wilfredo Hatt[facility] [place] [address]  [address] [facility] [address] [place] [address]  [phone]      Medical Progress Note      NAME:         Hayley Parker   :        1940  MRM:        [id number]    Date of service: 2022      Chief complaint: Confusion    Interval HPI: Patient admitted with persistent confusion. He is NOT able to give any hx. I have discussed with his nurse for collaborative hx. He had a fall last night. Objective:    Vital Signs:    Visit Vitals  /74 (BP 1 Location: Right upper arm, BP Patient Position: Lying)   Pulse 95   Temp 97.9 °F (36.6 °C)   Resp 16   Ht 6' 1\" (1.854 m)   Wt 93.4 kg (206 lb)   SpO2 98%   BMI 27.18 kg/m²          Intake/Output Summary (Last 24 hours) at 2022 0840  Last data filed at 2022 0839  Gross per 24 hour   Intake 640 ml   Output --   Net 640 ml        Physical Examination:    General:   Weak and frail, not in distress but confused   Eyes:   pink conjunctivae, PERRLA with no discharge. ENT:   no ottorrhea or rhinorrhea with dry mucous membranes  Neck: no masses, thyroid non-tender and trachea central.  Pulm:  no accessory muscle use, decreased breath sounds without crackles or wheezes  Card:  no JVD or murmurs, has regular and normal S1, S2 without thrills, bruits or peripheral edema  Abd:  Soft, non-distended, normoactive bowel sounds   Musc:  No cyanosis, clubbing, atrophy or deformities. Skin:  No rashes but bruised elbow areas    Neuro: Confused. Intermittently restless. Withdraws all four extremities.     Psych:  Has no insight to his illness     Current Facility-Administered Medications   Medication Dose Route Frequency    acetaminophen (TYLENOL) tablet 650 mg  650 mg Oral Q6H PRN    haloperidol lactate (HALDOL) injection 2 mg  2 mg IntraVENous Q6H PRN    ondansetron (ZOFRAN) injection 4 mg  4 mg IntraVENous Q6H PRN    haloperidol (HALDOL) 2 mg/mL oral solution 2 mg 2 mg Oral Q6H PRN    enoxaparin (LOVENOX) injection 40 mg  40 mg SubCUTAneous Q24H    atorvastatin (LIPITOR) tablet 10 mg  10 mg Oral QHS    memantine (NAMENDA) tablet 10 mg  10 mg Oral BID    losartan (COZAAR) tablet 25 mg  25 mg Oral DAILY    0.9% sodium chloride infusion  75 mL/hr IntraVENous CONTINUOUS        Laboratory data and review:    Recent Labs     06/30/22  0427 06/29/22  0809   WBC 7.4 7.8   HGB 15.6 15.5   HCT 44.8 44.4    186     Recent Labs     07/01/22  0522 06/30/22  0427 06/29/22  0809    140 142   K 3.5 3.5 3.8    108 108   CO2 26 23 27   GLU 97 90 107*   BUN 12 7 8   CREA 0.58* 0.52* 0.54*   CA 8.8 8.5 8.8   MG 1.7 1.8 2.0     No components found for: [place]    Diagnostics: Imaging studies have been reviewed    Assessment and Plan:    Dementia ([address] (6/30/2022) POA: with delirium and likely has progression and worsening of disease. Has intermittent restlessness that led to a fall last night. No apparent acute injuries. CT scan head neg. UA unremarkable. TSH, ammonia levels normal. Seen by hospice and he does not meet criteria for GIP admission. Continue supportive care. CM following for discharge planning    HTN (hypertension), benign (6/30/2022) / Hyperlipidemia POA: BP stable. Given his advanced dementia, would stop these medications at this time.      Total time spent for the patient's care: [address] discussed with: Family, Care Manager and Nursing Staff    Discussed:  Care Plan and D/C Planning    Prophylaxis:  Lovenox    Anticipated Disposition:  LTC vs hospice           ___________________________________________________    Attending Physician:   Cameron Regalado MD DISCHARGE